# Patient Record
Sex: FEMALE | Race: WHITE | NOT HISPANIC OR LATINO | Employment: OTHER | URBAN - METROPOLITAN AREA
[De-identification: names, ages, dates, MRNs, and addresses within clinical notes are randomized per-mention and may not be internally consistent; named-entity substitution may affect disease eponyms.]

---

## 2017-01-14 ENCOUNTER — HOSPITAL ENCOUNTER (OUTPATIENT)
Dept: LAB | Facility: MEDICAL CENTER | Age: 62
End: 2017-01-14
Attending: FAMILY MEDICINE
Payer: COMMERCIAL

## 2017-01-14 LAB
25(OH)D3 SERPL-MCNC: 21 NG/ML (ref 30–100)
ALBUMIN SERPL BCP-MCNC: 4.6 G/DL (ref 3.2–4.9)
ALBUMIN/GLOB SERPL: 1.6 G/DL
ALP SERPL-CCNC: 109 U/L (ref 30–99)
ALT SERPL-CCNC: 15 U/L (ref 2–50)
ANION GAP SERPL CALC-SCNC: 8 MMOL/L (ref 0–11.9)
AST SERPL-CCNC: 16 U/L (ref 12–45)
BASOPHILS # BLD AUTO: 0.03 K/UL (ref 0–0.12)
BASOPHILS NFR BLD AUTO: 0.6 % (ref 0–1.8)
BILIRUB SERPL-MCNC: 0.5 MG/DL (ref 0.1–1.5)
BUN SERPL-MCNC: 13 MG/DL (ref 8–22)
CALCIUM SERPL-MCNC: 9.4 MG/DL (ref 8.5–10.5)
CHLORIDE SERPL-SCNC: 104 MMOL/L (ref 96–112)
CHOLEST SERPL-MCNC: 254 MG/DL (ref 100–199)
CO2 SERPL-SCNC: 26 MMOL/L (ref 20–33)
CREAT SERPL-MCNC: 0.65 MG/DL (ref 0.5–1.4)
EOSINOPHIL # BLD: 0.15 K/UL (ref 0–0.51)
EOSINOPHIL NFR BLD AUTO: 2.8 % (ref 0–6.9)
ERYTHROCYTE [DISTWIDTH] IN BLOOD BY AUTOMATED COUNT: 44.6 FL (ref 35.9–50)
EST. AVERAGE GLUCOSE BLD GHB EST-MCNC: 111 MG/DL
GLOBULIN SER CALC-MCNC: 2.8 G/DL (ref 1.9–3.5)
GLUCOSE SERPL-MCNC: 103 MG/DL (ref 65–99)
HBA1C MFR BLD: 5.5 % (ref 0–5.6)
HCT VFR BLD AUTO: 44.5 % (ref 37–47)
HDLC SERPL-MCNC: 75 MG/DL
HGB BLD-MCNC: 15.5 G/DL (ref 12–16)
IMM GRANULOCYTES # BLD AUTO: 0.01 K/UL (ref 0–0.11)
IMM GRANULOCYTES NFR BLD AUTO: 0.2 % (ref 0–0.9)
LDLC SERPL CALC-MCNC: 156 MG/DL
LYMPHOCYTES # BLD: 1.6 K/UL (ref 1–4.8)
LYMPHOCYTES NFR BLD AUTO: 29.7 % (ref 22–41)
MCH RBC QN AUTO: 32.6 PG (ref 27–33)
MCHC RBC AUTO-ENTMCNC: 34.8 G/DL (ref 33.6–35)
MCV RBC AUTO: 93.7 FL (ref 81.4–97.8)
MONOCYTES # BLD: 0.58 K/UL (ref 0–0.85)
MONOCYTES NFR BLD AUTO: 10.8 % (ref 0–13.4)
NEUTROPHILS # BLD: 3.02 K/UL (ref 2–7.15)
NEUTROPHILS NFR BLD AUTO: 55.9 % (ref 44–72)
NRBC # BLD AUTO: 0 K/UL
NRBC BLD-RTO: 0 /100 WBC
PLATELET # BLD AUTO: 194 K/UL (ref 164–446)
PMV BLD AUTO: 11.6 FL (ref 9–12.9)
POTASSIUM SERPL-SCNC: 3.7 MMOL/L (ref 3.6–5.5)
PROT SERPL-MCNC: 7.4 G/DL (ref 6–8.2)
RBC # BLD AUTO: 4.75 M/UL (ref 4.2–5.4)
SODIUM SERPL-SCNC: 138 MMOL/L (ref 135–145)
T3FREE SERPL-MCNC: 4.22 PG/ML (ref 2.4–4.2)
T4 FREE SERPL-MCNC: 0.84 NG/DL (ref 0.53–1.43)
THYROPEROXIDASE AB SERPL-ACNC: 144.7 IU/ML (ref 0–9)
TRIGL SERPL-MCNC: 116 MG/DL (ref 0–149)
TSH SERPL DL<=0.005 MIU/L-ACNC: 0.23 UIU/ML (ref 0.3–3.7)
WBC # BLD AUTO: 5.4 K/UL (ref 4.8–10.8)

## 2017-01-14 PROCEDURE — 84443 ASSAY THYROID STIM HORMONE: CPT

## 2017-01-14 PROCEDURE — 85025 COMPLETE CBC W/AUTO DIFF WBC: CPT

## 2017-01-14 PROCEDURE — 36415 COLL VENOUS BLD VENIPUNCTURE: CPT

## 2017-01-14 PROCEDURE — 80061 LIPID PANEL: CPT

## 2017-01-14 PROCEDURE — 86376 MICROSOMAL ANTIBODY EACH: CPT

## 2017-01-14 PROCEDURE — 83036 HEMOGLOBIN GLYCOSYLATED A1C: CPT

## 2017-01-14 PROCEDURE — 80053 COMPREHEN METABOLIC PANEL: CPT

## 2017-01-14 PROCEDURE — 82306 VITAMIN D 25 HYDROXY: CPT

## 2017-01-14 PROCEDURE — 84439 ASSAY OF FREE THYROXINE: CPT

## 2017-01-14 PROCEDURE — 84481 FREE ASSAY (FT-3): CPT

## 2017-01-14 PROCEDURE — 86800 THYROGLOBULIN ANTIBODY: CPT

## 2017-01-16 LAB — THYROGLOB AB SERPL-ACNC: 360.3 IU/ML (ref 0–4)

## 2017-02-15 ENCOUNTER — TELEPHONE (OUTPATIENT)
Dept: PULMONOLOGY | Facility: HOSPICE | Age: 62
End: 2017-02-15

## 2017-02-15 DIAGNOSIS — G47.33 OBSTRUCTIVE SLEEP APNEA: ICD-10-CM

## 2017-03-22 ENCOUNTER — OFFICE VISIT (OUTPATIENT)
Dept: URGENT CARE | Facility: CLINIC | Age: 62
End: 2017-03-22
Payer: COMMERCIAL

## 2017-03-22 VITALS
HEIGHT: 62 IN | TEMPERATURE: 98.7 F | WEIGHT: 180 LBS | RESPIRATION RATE: 14 BRPM | HEART RATE: 126 BPM | BODY MASS INDEX: 33.13 KG/M2 | DIASTOLIC BLOOD PRESSURE: 74 MMHG | OXYGEN SATURATION: 96 % | SYSTOLIC BLOOD PRESSURE: 138 MMHG

## 2017-03-22 DIAGNOSIS — B96.89 ACUTE BACTERIAL SINUSITIS: ICD-10-CM

## 2017-03-22 DIAGNOSIS — R05.9 COUGH: ICD-10-CM

## 2017-03-22 DIAGNOSIS — J01.90 ACUTE BACTERIAL SINUSITIS: ICD-10-CM

## 2017-03-22 PROCEDURE — 99214 OFFICE O/P EST MOD 30 MIN: CPT | Performed by: NURSE PRACTITIONER

## 2017-03-22 RX ORDER — DIPHENHYDRAMINE HCL 25 MG
25 TABLET ORAL EVERY 6 HOURS PRN
COMMUNITY
End: 2018-01-12

## 2017-03-22 RX ORDER — AMOXICILLIN AND CLAVULANATE POTASSIUM 875; 125 MG/1; MG/1
1 TABLET, FILM COATED ORAL 2 TIMES DAILY
Qty: 14 TAB | Refills: 0 | Status: SHIPPED | OUTPATIENT
Start: 2017-03-22 | End: 2017-10-01

## 2017-03-22 ASSESSMENT — ENCOUNTER SYMPTOMS
SORE THROAT: 1
COUGH: 1
SINUS PAIN: 1
HEADACHES: 1
VOMITING: 0
NAUSEA: 0
RHINORRHEA: 1

## 2017-03-22 ASSESSMENT — VISUAL ACUITY
OD_CC: 20/15
OS_CC: 20/25

## 2017-03-22 NOTE — MR AVS SNAPSHOT
"        Lashae Patelell   3/22/2017 10:30 AM   Office Visit   MRN: [id number]    Department:  [facility]   Dept Phone:  [phone]    Description:  Female : 1955   Provider:  LAWRENCE Aldana           Reason for Visit     URI uri symptoms x 2 weeks .bilateral eye irritation x 2 days .      Allergies as of 3/22/2017     No Known Allergies      You were diagnosed with     Acute bacterial sinusitis   [071611]       Cough   [786.2.ICD-9-CM]         Vital Signs     Blood Pressure Pulse Temperature Respirations Height Weight    138/74 mmHg 126 37.1 °C (98.7 °F) 14 1.575 m (5' 2\") 81.647 kg (180 lb)    Body Mass Index Oxygen Saturation Smoking Status             32.91 kg/m2 96% Former Smoker         Basic Information     Date Of Birth Sex Race Ethnicity Preferred Language    1955 Female White Non- English      Your appointments     2017  4:00 PM   Established Patient with Valery Ibarra M.D.   [address] [address] [phone]           You will be receiving a confirmation call a few days before your appointment from our automated call confirmation system.   Please bring to your appointment; a photo ID, copies of your insurance card, all medication bottles and any co-pay that you are responsible for.  Please allow 45-60 minutes to complete your scheduled appointment.            2017  2:40 PM   Follow UP with LAWRENCE Mcgill   [facility] (--)    [address] [place] [address] A  [place] [id number] [phone]              Problem List              ICD-10-CM Priority Class Noted - Resolved    Hashimoto's thyroiditis E06.3   7/15/2016 - Present    Dyslipidemia, goal LDL below 130 E78.5   Unknown - Present    GERD (gastroesophageal reflux disease) K21.9   Unknown - Present    Elevated fasting glucose R73.01   7/15/2016 - Present    ROMELIA (obstructive sleep apnea) G47.33   " 7/28/2016 - Present    Hypersomnolence G47.10   7/28/2016 - Present      Health Maintenance        Date Due Completion Dates    IMM DTaP/Tdap/Td Vaccine (1 - Tdap) 1/30/1975 ---    COLONOSCOPY 1/30/2006 ---    MAMMOGRAM 7/1/2015 7/1/2014 (Prv Comp)    Override on 7/1/2014: Previously completed    IMM ZOSTER VACCINE 1/30/2016 ---    IMM INFLUENZA (1) 9/1/2016 ---    PAP SMEAR 6/23/2019 6/23/2016 (Prv Comp)    Override on 6/23/2016: Previously completed (Dr. Lozano)            Current Immunizations     No immunizations on file.      Below and/or attached are the medications your provider expects you to take. Review all of your home medications and newly ordered medications with your provider and/or pharmacist. Follow medication instructions as directed by your provider and/or pharmacist. Please keep your medication list with you and share with your provider. Update the information when medications are discontinued, doses are changed, or new medications (including over-the-counter products) are added; and carry medication information at all times in the event of emergency situations     Allergies:  No Known Allergies          Medications  Valid as of: March 22, 2017 - 11:04 AM    Generic Name Brand Name Tablet Size Instructions for use    Amoxicillin-Pot Clavulanate (Tab) AUGMENTIN 875-125 MG Take 1 Tab by mouth 2 times a day.        Brompheniramine-Phenylephrine   Take  by mouth.        DiphenhydrAMINE HCl (Tab) BENADRYL 25 MG Take 25 mg by mouth every 6 hours as needed for Sleep.        Hydrocod Polst-Chlorphen Polst (Suspension Extended Release) TUSSIONEX 10-8 MG/5ML Take 5 mL by mouth every 12 hours.        Multiple Minerals-Vitamins   Take  by mouth.        Probiotic Product   Take  by mouth.        .                 Medicines prescribed today were sent to:     [id number] - [place], [place] - [address] [place] [address]    Phone: [phone] Fax: [phone]    Open 24 Hours?:  No      Medication refill instructions:       If your prescription bottle indicates you have medication refills left, it is not necessary to call your provider’s office. Please contact your pharmacy and they will refill your medication.    If your prescription bottle indicates you do not have any refills left, you may request refills at any time through one of the following ways: The online [employer] system (except Urgent Care), by calling your provider’s office, or by asking your pharmacy to contact your provider’s office with a refill request. Medication refills are processed only during regular business hours and may not be available until the next business day. Your provider may request additional information or to have a follow-up visit with you prior to refilling your medication.   *Please Note: Medication refills are assigned a new Rx number when refilled electronically. Your pharmacy may indicate that no refills were authorized even though a new prescription for the same medication is available at the pharmacy. Please request the medicine by name with the pharmacy before contacting your provider for a refill.           [employer] Access Code: Activation code not generated  Current [employer] Status: Active

## 2017-03-22 NOTE — PROGRESS NOTES
"Subjective:      Lashae Hoff is a 62 y.o. female who presents with URI            URI   This is a new problem. The current episode started 1 to 4 weeks ago. The problem has been gradually worsening. There has been no fever. Associated symptoms include congestion, coughing, headaches, rhinorrhea, sinus pain and a sore throat. Pertinent negatives include no joint pain, nausea or vomiting. She has tried antihistamine and decongestant for the symptoms. The treatment provided mild relief.   Allergies, medications and history reviewed by me today      Review of Systems   HENT: Positive for congestion, rhinorrhea and sore throat.    Respiratory: Positive for cough.    Gastrointestinal: Negative for nausea and vomiting.   Musculoskeletal: Negative for joint pain.   Neurological: Positive for headaches.          Objective:     /74 mmHg  Pulse 126  Temp(Src) 37.1 °C (98.7 °F)  Resp 14  Ht 1.575 m (5' 2\")  Wt 81.647 kg (180 lb)  BMI 32.91 kg/m2  SpO2 96%     Physical Exam   Constitutional: She is oriented to person, place, and time. She appears well-developed and well-nourished. No distress.   HENT:   Head: Normocephalic and atraumatic.   Right Ear: External ear and ear canal normal. Tympanic membrane is not injected and not perforated. No middle ear effusion.   Left Ear: External ear and ear canal normal. Tympanic membrane is not injected and not perforated.  No middle ear effusion.   Nose: Mucosal edema present. Left sinus exhibits maxillary sinus tenderness.   Mouth/Throat: Posterior oropharyngeal erythema present. No oropharyngeal exudate.   Eyes: Conjunctivae are normal. Right eye exhibits no discharge. Left eye exhibits no discharge.   Neck: Normal range of motion. Neck supple.   Cardiovascular: Normal rate, regular rhythm and normal heart sounds.    No murmur heard.  Pulmonary/Chest: Effort normal and breath sounds normal. No respiratory distress.   Musculoskeletal: Normal range of motion. "   Normal movement of all 4 extremities.   Lymphadenopathy:     She has no cervical adenopathy.        Right: No supraclavicular adenopathy present.        Left: No supraclavicular adenopathy present.   Neurological: She is alert and oriented to person, place, and time. Gait normal.   Skin: Skin is warm and dry.   Psychiatric: She has a normal mood and affect. Her behavior is normal. Thought content normal.   Nursing note and vitals reviewed.              Assessment/Plan:     1. Acute bacterial sinusitis  amoxicillin-clavulanate (AUGMENTIN) 875-125 MG Tab   2. Cough  Hydrocod Polst-CPM Polst ER (TUSSIONEX) 10-8 MG/5ML Suspension Extended Release     Patient is cautioned on sedation potential of narcotic medication; no drinking, driving or operating heavy machinery while on this medication.  Differential diagnosis, natural history, supportive care, and indications for immediate follow-up discussed at length.

## 2017-05-01 ENCOUNTER — SLEEP CENTER VISIT (OUTPATIENT)
Dept: SLEEP MEDICINE | Facility: MEDICAL CENTER | Age: 62
End: 2017-05-01
Payer: COMMERCIAL

## 2017-05-01 VITALS
RESPIRATION RATE: 16 BRPM | OXYGEN SATURATION: 92 % | TEMPERATURE: 97.9 F | SYSTOLIC BLOOD PRESSURE: 130 MMHG | HEART RATE: 98 BPM | HEIGHT: 62 IN | BODY MASS INDEX: 35.15 KG/M2 | DIASTOLIC BLOOD PRESSURE: 90 MMHG | WEIGHT: 191 LBS

## 2017-05-01 DIAGNOSIS — G47.33 OSA (OBSTRUCTIVE SLEEP APNEA): ICD-10-CM

## 2017-05-01 PROCEDURE — 99213 OFFICE O/P EST LOW 20 MIN: CPT | Performed by: NURSE PRACTITIONER

## 2017-05-01 NOTE — PROGRESS NOTES
"Chief Complaint   Patient presents with   • Apnea     6 Mth follow up         HPI:  This is a 62 y.o. female with a history of obstructive sleep apnea.  Polysomnogram indicates AHI 28.7 and minimum saturation 79%. The patient is compliant with CPAP 9 cm. Compliance dated 4/1-30/17 indicates 100% compliance for 7 hours 43 minutes. AHI is then reduced to 0.3. The patient has no problems tolerating the mask or pressure. She reports waking up tired but feels she is sleeping better. She continues to benefit from therapy.    Past Medical History   Diagnosis Date   • History of asthma    • Arthritis of knee, right      ?   • Bruxism    • GERD (gastroesophageal reflux disease)    • History of hyperthyroidism      hachimoto's thyroiditis   • Chickenpox    • Dyslipidemia, goal LDL below 130    • Snoring    • Elevated fasting glucose        Past Surgical History   Procedure Laterality Date   • Tonsillectomy     • [place] by laparoscopy  2013       Social History   Substance Use Topics   • Smoking status: Former Smoker -- 1.00 packs/day for 2 years     Types: Cigarettes     Quit date: 07/06/1986   • Smokeless tobacco: Never Used   • Alcohol Use: 3.0 oz/week     5 Glasses of wine per week       ROS:   Constitutional: Denies fevers, chills, sweats, fatigue, and weight loss.  Eyes: Denies glasses.  Ears/nose/mouth/throat: Denies injury.  Cardiovascular: Denies chest pain, tightness.  Respiratory: Denies shortness of breath, cough, sputum, wheezing, hemoptysis.  GI: Denies heartburn, difficulty swallowing, nausea, and vomiting.  Neurological: Denies frequent headaches, dizziness, weakness.  Sleep: See history of present illness.    Vitals:  Filed Vitals:    05/01/17 0805   Height: 1.575 m (5' 2.01\")   Weight: 86.637 kg (191 lb)   Weight % change since last entry.: 0 %   BP: 130/90   Pulse: 98   BMI (Calculated): 34.93   Resp: 16   Temp: 36.6 °C (97.9 °F)   O2 sat % room air: 92 %     Allergies:  Review of patient's allergies " indicates no known allergies.    Medications.  Current Outpatient Prescriptions   Medication Sig Dispense Refill   • diphenhydrAMINE (BENADRYL) 25 MG Tab Take 25 mg by mouth every 6 hours as needed for Sleep.     • Brompheniramine-Phenylephrine (DIMETAPP COLD/ALLERGY PO) Take  by mouth.     • Hydrocod Polst-CPM Polst ER (TUSSIONEX) 10-8 MG/5ML Suspension Extended Release Take 5 mL by mouth every 12 hours. 140 mL 0   • amoxicillin-clavulanate (AUGMENTIN) 875-125 MG Tab Take 1 Tab by mouth 2 times a day. (Patient not taking: Reported on 5/1/2017) 14 Tab 0   • Multiple Minerals-Vitamins (CALCIUM & VIT D3 BONE HEALTH PO) Take  by mouth.     • Probiotic Product (PROBIOTIC DAILY PO) Take  by mouth.       No current facility-administered medications for this visit.       PHYSICAL EXAM:  Appearance: Well-developed, well-nourished, no acute distress.  Eyes. PERRL.  Hearing: Grossly intact.  Oropharynx: Tongue normal, posterior pharynx without erythema or exudate.  Respiratory effort: No intercostal retractions or use of accessory muscles.  Lung auscultation: No crackles, wheezing.  Heart auscultation: No murmur, gallop, or rub. Regular rate and rhythm.  Extremities: No cyanosis or edema.  Gait and Station: Normal  Orientation: Oriented to time, place, and person.    Assessment:  1. ROMELIA (obstructive sleep apnea)           Plan:  1. Continue CPAP 9 cm.  2. Clean equipment weekly and replace supplies as allowed by insurance.  3. Sleep hygiene discussed along with encouraged weight loss.     Return in about 1 year (around 5/1/2018) for With ALICE Miguel.

## 2017-05-01 NOTE — PATIENT INSTRUCTIONS
1. Continue CPAP 9 cm.  2. Clean equipment weekly and replace supplies as allowed by insurance.  3. Sleep hygiene discussed along with encouraged weight loss.

## 2017-05-01 NOTE — MR AVS SNAPSHOT
"        Lashae Hoff   2017 8:00 AM   Sleep Center Visit   MRN: [id number]    Department:  [facility]   Dept Phone:  [phone]    Description:  Female : 1955   Provider:  LAWRENCE Mario           Reason for Visit     Apnea 6 Mth follow up      Allergies as of 2017     No Known Allergies      You were diagnosed with     ROMELIA (obstructive sleep apnea)   [147866]         Vital Signs     Blood Pressure Pulse Temperature Respirations Height Weight    130/90 mmHg 98 36.6 °C (97.9 °F) 16 1.575 m (5' 2.01\") 86.637 kg (191 lb)    Body Mass Index Oxygen Saturation Smoking Status             34.93 kg/m2 92% Former Smoker         Basic Information     Date Of Birth Sex Race Ethnicity Preferred Language    1955 Female White Non- English      Your appointments     2017  3:20 PM   Annual/Preventative with Valery Ibarra M.D.   [address] [id number] [phone]           You will be receiving a confirmation call a few days before your appointment from our automated call confirmation system.   Please bring to your appointment; a photo ID, copies of your insurance card, all medication bottles and any co-pay that you are responsible for.  Please allow 45-60 minutes to complete your scheduled appointment.              Problem List              ICD-10-CM Priority Class Noted - Resolved    Hashimoto's thyroiditis E06.3   7/15/2016 - Present    Dyslipidemia, goal LDL below 130 E78.5   Unknown - Present    GERD (gastroesophageal reflux disease) K21.9   Unknown - Present    Elevated fasting glucose R73.01   7/15/2016 - Present    ROMELIA (obstructive sleep apnea) G47.33   2016 - Present    Hypersomnolence G47.10   2016 - Present      Health Maintenance        Date Due Completion Dates    IMM DTaP/Tdap/Td Vaccine (1 - Tdap) 1975 ---    COLONOSCOPY 2006 ---    MAMMOGRAM 2015 (Prv Comp)   " Override on 7/1/2014: Previously completed    IMM ZOSTER VACCINE 1/30/2016 ---    PAP SMEAR 6/23/2019 6/23/2016 (Prv Comp)    Override on 6/23/2016: Previously completed (Dr. Lozano)            Current Immunizations     No immunizations on file.      Below and/or attached are the medications your provider expects you to take. Review all of your home medications and newly ordered medications with your provider and/or pharmacist. Follow medication instructions as directed by your provider and/or pharmacist. Please keep your medication list with you and share with your provider. Update the information when medications are discontinued, doses are changed, or new medications (including over-the-counter products) are added; and carry medication information at all times in the event of emergency situations     Allergies:  No Known Allergies          Medications  Valid as of: May 01, 2017 -  8:23 AM    Generic Name Brand Name Tablet Size Instructions for use    Amoxicillin-Pot Clavulanate (Tab) AUGMENTIN 875-125 MG Take 1 Tab by mouth 2 times a day.        Brompheniramine-Phenylephrine   Take  by mouth.        DiphenhydrAMINE HCl (Tab) BENADRYL 25 MG Take 25 mg by mouth every 6 hours as needed for Sleep.        Hydrocod Polst-Chlorphen Polst (Suspension Extended Release) TUSSIONEX 10-8 MG/5ML Take 5 mL by mouth every 12 hours.        Multiple Minerals-Vitamins   Take  by mouth.        Probiotic Product   Take  by mouth.        .                 Medicines prescribed today were sent to:     [id number] - [place], [place] - [address] [place] [id number]    Phone: [phone] Fax: [phone]    Open 24 Hours?: No      Medication refill instructions:       If your prescription bottle indicates you have medication refills left, it is not necessary to call your provider’s office. Please contact your pharmacy and they will refill your medication.    If your prescription bottle indicates you do not  have any refills left, you may request refills at any time through one of the following ways: The online [employer] system (except Urgent Care), by calling your provider’s office, or by asking your pharmacy to contact your provider’s office with a refill request. Medication refills are processed only during regular business hours and may not be available until the next business day. Your provider may request additional information or to have a follow-up visit with you prior to refilling your medication.   *Please Note: Medication refills are assigned a new Rx number when refilled electronically. Your pharmacy may indicate that no refills were authorized even though a new prescription for the same medication is available at the pharmacy. Please request the medicine by name with the pharmacy before contacting your provider for a refill.        Instructions    1. Continue CPAP 9 cm.  2. Clean equipment weekly and replace supplies as allowed by insurance.  3. Sleep hygiene discussed along with encouraged weight loss.               [employer] Access Code: Activation code not generated  Current [employer] Status: Active

## 2017-05-31 ENCOUNTER — HOSPITAL ENCOUNTER (OUTPATIENT)
Facility: MEDICAL CENTER | Age: 62
End: 2017-05-31
Attending: OBSTETRICS & GYNECOLOGY
Payer: COMMERCIAL

## 2017-05-31 PROCEDURE — 88305 TISSUE EXAM BY PATHOLOGIST: CPT

## 2017-07-28 ENCOUNTER — HOSPITAL ENCOUNTER (OUTPATIENT)
Dept: LAB | Facility: MEDICAL CENTER | Age: 62
End: 2017-07-28
Attending: NURSE PRACTITIONER
Payer: COMMERCIAL

## 2017-07-28 LAB
25(OH)D3 SERPL-MCNC: 21 NG/ML (ref 30–100)
ALBUMIN SERPL BCP-MCNC: 4.6 G/DL (ref 3.2–4.9)
ALBUMIN/GLOB SERPL: 1.6 G/DL
ALP SERPL-CCNC: 126 U/L (ref 30–99)
ALT SERPL-CCNC: 13 U/L (ref 2–50)
ANION GAP SERPL CALC-SCNC: 10 MMOL/L (ref 0–11.9)
APPEARANCE UR: CLEAR
AST SERPL-CCNC: 14 U/L (ref 12–45)
BACTERIA #/AREA URNS HPF: NEGATIVE /HPF
BASOPHILS # BLD AUTO: 0.7 % (ref 0–1.8)
BASOPHILS # BLD: 0.05 K/UL (ref 0–0.12)
BILIRUB SERPL-MCNC: 0.5 MG/DL (ref 0.1–1.5)
BILIRUB UR QL STRIP.AUTO: NEGATIVE
BUN SERPL-MCNC: 13 MG/DL (ref 8–22)
CALCIUM SERPL-MCNC: 9.5 MG/DL (ref 8.5–10.5)
CHLORIDE SERPL-SCNC: 104 MMOL/L (ref 96–112)
CHOLEST SERPL-MCNC: 243 MG/DL (ref 100–199)
CO2 SERPL-SCNC: 24 MMOL/L (ref 20–33)
COLOR UR: YELLOW
CREAT SERPL-MCNC: 0.66 MG/DL (ref 0.5–1.4)
CULTURE IF INDICATED INDCX: YES UA CULTURE
EOSINOPHIL # BLD AUTO: 0.21 K/UL (ref 0–0.51)
EOSINOPHIL NFR BLD: 3.1 % (ref 0–6.9)
EPI CELLS #/AREA URNS HPF: NEGATIVE /HPF
ERYTHROCYTE [DISTWIDTH] IN BLOOD BY AUTOMATED COUNT: 47.7 FL (ref 35.9–50)
GFR SERPL CREATININE-BSD FRML MDRD: >60 ML/MIN/1.73 M 2
GLOBULIN SER CALC-MCNC: 2.9 G/DL (ref 1.9–3.5)
GLUCOSE SERPL-MCNC: 94 MG/DL (ref 65–99)
GLUCOSE UR STRIP.AUTO-MCNC: NEGATIVE MG/DL
HCT VFR BLD AUTO: 46.4 % (ref 37–47)
HDLC SERPL-MCNC: 70 MG/DL
HGB BLD-MCNC: 15.6 G/DL (ref 12–16)
HYALINE CASTS #/AREA URNS LPF: NORMAL /LPF
IMM GRANULOCYTES # BLD AUTO: 0.02 K/UL (ref 0–0.11)
IMM GRANULOCYTES NFR BLD AUTO: 0.3 % (ref 0–0.9)
KETONES UR STRIP.AUTO-MCNC: NEGATIVE MG/DL
LDLC SERPL CALC-MCNC: 135 MG/DL
LEUKOCYTE ESTERASE UR QL STRIP.AUTO: ABNORMAL
LYMPHOCYTES # BLD AUTO: 1.92 K/UL (ref 1–4.8)
LYMPHOCYTES NFR BLD: 28.4 % (ref 22–41)
MCH RBC QN AUTO: 31.7 PG (ref 27–33)
MCHC RBC AUTO-ENTMCNC: 33.6 G/DL (ref 33.6–35)
MCV RBC AUTO: 94.3 FL (ref 81.4–97.8)
MICRO URNS: ABNORMAL
MONOCYTES # BLD AUTO: 0.58 K/UL (ref 0–0.85)
MONOCYTES NFR BLD AUTO: 8.6 % (ref 0–13.4)
NEUTROPHILS # BLD AUTO: 3.98 K/UL (ref 2–7.15)
NEUTROPHILS NFR BLD: 58.9 % (ref 44–72)
NITRITE UR QL STRIP.AUTO: NEGATIVE
NRBC # BLD AUTO: 0 K/UL
NRBC BLD AUTO-RTO: 0 /100 WBC
PH UR STRIP.AUTO: 6.5 [PH]
PLATELET # BLD AUTO: 225 K/UL (ref 164–446)
PMV BLD AUTO: 12.7 FL (ref 9–12.9)
POTASSIUM SERPL-SCNC: 3.9 MMOL/L (ref 3.6–5.5)
PROT SERPL-MCNC: 7.5 G/DL (ref 6–8.2)
PROT UR QL STRIP: NEGATIVE MG/DL
RBC # BLD AUTO: 4.92 M/UL (ref 4.2–5.4)
RBC # URNS HPF: NORMAL /HPF
RBC UR QL AUTO: NEGATIVE
RHEUMATOID FACT SER IA-ACNC: <10 IU/ML (ref 0–14)
SODIUM SERPL-SCNC: 138 MMOL/L (ref 135–145)
SP GR UR STRIP.AUTO: 1.01
T3FREE SERPL-MCNC: 3.33 PG/ML (ref 2.4–4.2)
T4 FREE SERPL-MCNC: 0.84 NG/DL (ref 0.53–1.43)
TRIGL SERPL-MCNC: 189 MG/DL (ref 0–149)
TSH SERPL DL<=0.005 MIU/L-ACNC: 0.38 UIU/ML (ref 0.3–3.7)
UROBILINOGEN UR STRIP.AUTO-MCNC: 0.2 MG/DL
WBC # BLD AUTO: 6.8 K/UL (ref 4.8–10.8)
WBC #/AREA URNS HPF: NORMAL /HPF

## 2017-07-28 PROCEDURE — 84439 ASSAY OF FREE THYROXINE: CPT

## 2017-07-28 PROCEDURE — 80053 COMPREHEN METABOLIC PANEL: CPT

## 2017-07-28 PROCEDURE — 86800 THYROGLOBULIN ANTIBODY: CPT

## 2017-07-28 PROCEDURE — 84443 ASSAY THYROID STIM HORMONE: CPT

## 2017-07-28 PROCEDURE — 87086 URINE CULTURE/COLONY COUNT: CPT

## 2017-07-28 PROCEDURE — 80061 LIPID PANEL: CPT

## 2017-07-28 PROCEDURE — 82784 ASSAY IGA/IGD/IGG/IGM EACH: CPT

## 2017-07-28 PROCEDURE — 81001 URINALYSIS AUTO W/SCOPE: CPT

## 2017-07-28 PROCEDURE — 86431 RHEUMATOID FACTOR QUANT: CPT

## 2017-07-28 PROCEDURE — 36415 COLL VENOUS BLD VENIPUNCTURE: CPT

## 2017-07-28 PROCEDURE — 83516 IMMUNOASSAY NONANTIBODY: CPT

## 2017-07-28 PROCEDURE — 83013 H PYLORI (C-13) BREATH: CPT

## 2017-07-28 PROCEDURE — 84481 FREE ASSAY (FT-3): CPT

## 2017-07-28 PROCEDURE — 86038 ANTINUCLEAR ANTIBODIES: CPT

## 2017-07-28 PROCEDURE — 82306 VITAMIN D 25 HYDROXY: CPT

## 2017-07-28 PROCEDURE — 85025 COMPLETE CBC W/AUTO DIFF WBC: CPT

## 2017-07-30 LAB
BACTERIA UR CULT: NORMAL
IGA SERPL-MCNC: 134 MG/DL (ref 68–408)
SIGNIFICANT IND 70042: NORMAL
SOURCE SOURCE: NORMAL
TTG IGA SER IA-ACNC: 1 U/ML (ref 0–3)
UREA BREATH TEST QL: NEGATIVE

## 2017-08-01 LAB
NUCLEAR IGG SER QL IA: DETECTED
NUCLEAR IGG TITR SER IF: ABNORMAL {TITER}

## 2017-08-02 LAB
THYROGLOB AB SERPL-ACNC: 302.1 IU/ML (ref 0–4)
THYROGLOB SERPL-MCNC: 2.6 NG/ML (ref 1.3–31.8)
THYROGLOB SERPL-MCNC: ABNORMAL NG/ML (ref 1.3–31.8)

## 2017-10-01 ENCOUNTER — OFFICE VISIT (OUTPATIENT)
Dept: URGENT CARE | Facility: CLINIC | Age: 62
End: 2017-10-01
Payer: COMMERCIAL

## 2017-10-01 VITALS
OXYGEN SATURATION: 96 % | WEIGHT: 190 LBS | HEIGHT: 62 IN | SYSTOLIC BLOOD PRESSURE: 118 MMHG | TEMPERATURE: 98.3 F | DIASTOLIC BLOOD PRESSURE: 76 MMHG | BODY MASS INDEX: 34.96 KG/M2 | RESPIRATION RATE: 16 BRPM | HEART RATE: 102 BPM

## 2017-10-01 DIAGNOSIS — R05.8 PRODUCTIVE COUGH: ICD-10-CM

## 2017-10-01 DIAGNOSIS — J22 ACUTE LOWER RESPIRATORY TRACT INFECTION: ICD-10-CM

## 2017-10-01 PROCEDURE — 99214 OFFICE O/P EST MOD 30 MIN: CPT | Performed by: NURSE PRACTITIONER

## 2017-10-01 RX ORDER — AZITHROMYCIN 250 MG/1
TABLET, FILM COATED ORAL
Qty: 6 TAB | Refills: 0 | Status: SHIPPED | OUTPATIENT
Start: 2017-10-01 | End: 2018-01-12

## 2017-10-01 RX ORDER — CODEINE PHOSPHATE AND GUAIFENESIN 10; 100 MG/5ML; MG/5ML
5 SOLUTION ORAL
Qty: 120 ML | Refills: 0 | Status: SHIPPED | OUTPATIENT
Start: 2017-10-01 | End: 2018-01-12

## 2017-10-01 RX ORDER — BENZONATATE 100 MG/1
100 CAPSULE ORAL 3 TIMES DAILY PRN
Qty: 30 CAP | Refills: 0 | Status: SHIPPED | OUTPATIENT
Start: 2017-10-01 | End: 2018-01-12

## 2017-10-01 NOTE — PROGRESS NOTES
Chief Complaint   Patient presents with   • URI     x 5 days with fever, cough, lung pain and back pain and guetting worse. pt preffers paper Rx       HISTORY OF PRESENT ILLNESS: Patient is a 62 y.o. female who presents today due to symptoms that started six days ago, gradually worsening by the day. Pt reports a productive cough without blood in sputum. Reports associated mild sore throat, nasal congestion, sinus pressure, fever, and body aches. Denies chest pain, shortness of breath, or wheezing. Denies h/o asthma/copd/CAP. No immunocompromise. Has tried OTC cold medications without significant relief of symptoms. No recent ABX use. No other aggravating or alleviating factors.     Patient Active Problem List    Diagnosis Date Noted   • ROMELIA (obstructive sleep apnea) 07/28/2016   • Hypersomnolence 07/28/2016   • Hashimoto's thyroiditis 07/15/2016   • Elevated fasting glucose 07/15/2016   • Dyslipidemia, goal LDL below 130    • GERD (gastroesophageal reflux disease)        Allergies:Review of patient's allergies indicates no known allergies.    Current Outpatient Prescriptions Ordered in [facility]   Medication Sig Dispense Refill   • azithromycin (ZITHROMAX) 250 MG Tab Take two tabs on day one followed by one tab on days 2-5. 6 Tab 0   • guaifenesin-codeine (ROBITUSSIN AC) Solution oral solution Take 5 mL by mouth at bedtime as needed. 120 mL 0   • benzonatate (TESSALON) 100 MG Cap Take 1 Cap by mouth 3 times a day as needed. 30 Cap 0   • diphenhydrAMINE (BENADRYL) 25 MG Tab Take 25 mg by mouth every 6 hours as needed for Sleep.     • Brompheniramine-Phenylephrine (DIMETAPP COLD/ALLERGY PO) Take  by mouth.     • Multiple Minerals-Vitamins (CALCIUM & VIT D3 BONE HEALTH PO) Take  by mouth.     • Probiotic Product (PROBIOTIC DAILY PO) Take  by mouth.       No current Epic-ordered facility-administered medications on file.        Past Medical History:   Diagnosis Date   • Arthritis of knee, right     ?   • Bruxism    •  "Chickenpox    • Dyslipidemia, goal LDL below 130    • Elevated fasting glucose    • GERD (gastroesophageal reflux disease)    • History of asthma    • History of hyperthyroidism     hachimoto's thyroiditis   • Snoring        Social History   Substance Use Topics   • Smoking status: Former Smoker     Packs/day: 1.00     Years: 2.00     Types: Cigarettes     Quit date: 1986   • Smokeless tobacco: Never Used   • Alcohol use 3.0 oz/week     5 Glasses of wine per week       Family Status   Relation Status   • Mother Alive   • Brother Alive   • Father    • Brother Alive   • Son Alive   • Son Alive   • Daughter Alive     Family History   Problem Relation Age of Onset   • Cancer Father      throat   • Hypertension Mother    • Arthritis Father      gout   • Diabetes Mother      typr II   • Hyperlipidemia Mother    • Diabetes Brother        ROS:  Review of Systems   Constitutional: Positive for subjective fever, chills, fatigue. Negative for weight loss and malaise.  HENT: Positive for congestion and sore throat. Negative for ear pain, nosebleeds, and neck pain.    Eyes: Negative for vision changes.   Cardiovascular: Negative for chest pain, palpitations, orthopnea and leg swelling.   Respiratory: Positive for cough and sputum production. Negative for shortness of breath and wheezing.   Gastrointestinal: Negative for abdominal pain, nausea, vomiting or diarrhea.   Skin: Negative for rash, diaphoresis.     Exam:  Blood pressure 118/76, pulse (!) 102, temperature 36.8 °C (98.3 °F), resp. rate 16, height 1.575 m (5' 2\"), weight 86.2 kg (190 lb), SpO2 96 %.  General: well-nourished, well-developed female in NAD  Head: normocephalic, atraumatic  Eyes: PERRLA, EOM within normal limits, no conjunctival injection, no scleral icterus, visual fields and acuity grossly intact.  Ears: normal shape and symmetry, no tenderness, no discharge. External canals are without any significant edema or erythema. Tympanic membranes are " without any inflammation, no effusion. Gross auditory acuity is intact.  Nose: symmetrical without tenderness, mild discharge, erythema present bilateral nares.  Mouth/Throat: reasonable hygiene, no exudates or tonsillar enlargement. Erythema present.   Neck: no masses, range of motion within normal limits, no tracheal deviation.  Lymph: mild cervical adenopathy. No supraclavicular adenopathy.   Neuro: alert and oriented. Cranial nerves 1-12 grossly intact.   Cardiovascular: tachycardic rate and regular rhythm without murmurs, rubs, or gallops. No edema.   Pulmonary: no distress. Chest is symmetrical with respiration, no wheezes, crackles. Scattered rhonchi RLL.   Musculoskeletal: appropriate muscle tone, gait is stable.  Skin: warm, dry, intact, no clubbing, no cyanosis.   Psych: appropriate mood, affect, judgement.         Assessment/Plan:  1. Acute lower respiratory tract infection  azithromycin (ZITHROMAX) 250 MG Tab   2. Productive cough  azithromycin (ZITHROMAX) 250 MG Tab    guaifenesin-codeine (ROBITUSSIN AC) Solution oral solution    benzonatate (TESSALON) 100 MG Cap           Discussed that I felt her symptoms were most likely viral in nature. Contingent antibiotic prescription given to patient to fill upon meeting criteria of guidelines discussed. I have offered patient chest x-ray today, declining. Tessalon and Robitussin AC for cough, sedative effects of medication discussed with patient. Rest, increase fluids, hand and respiratory hygiene. May take OTC medications as directed for symptom relief.   Supportive care, differential diagnoses, and indications for immediate follow-up discussed with patient.   Pathogenesis of diagnosis discussed including typical length and natural progression.  Instructed to return to clinic or nearest emergency department for any change in condition, further concerns, or worsening of symptoms.  Patient states understanding of the plan of care and discharge  instructions.  Instructed to make an appointment with their primary care provider in the next 3-7 days if not significantly improving and for further care.         Please note that this dictation was created using voice recognition software. I have made every reasonable attempt to correct obvious errors, but I expect that there are errors of grammar and possibly content that I did not discover before finalizing the note.      BRYON Bahena.

## 2017-11-02 ENCOUNTER — HOSPITAL ENCOUNTER (OUTPATIENT)
Facility: MEDICAL CENTER | Age: 62
End: 2017-11-02
Payer: COMMERCIAL

## 2017-11-02 LAB
ALBUMIN SERPL BCP-MCNC: 4.5 G/DL (ref 3.2–4.9)
ALBUMIN/GLOB SERPL: 1.6 G/DL
ALP SERPL-CCNC: 117 U/L (ref 30–99)
ALT SERPL-CCNC: 13 U/L (ref 2–50)
ANION GAP SERPL CALC-SCNC: 9 MMOL/L (ref 0–11.9)
AST SERPL-CCNC: 10 U/L (ref 12–45)
BDY FAT % MEASURED: 44.6 %
BILIRUB SERPL-MCNC: 0.4 MG/DL (ref 0.1–1.5)
BP DIAS: 80 MMHG
BP SYS: 130 MMHG
BUN SERPL-MCNC: 15 MG/DL (ref 8–22)
CALCIUM SERPL-MCNC: 9.6 MG/DL (ref 8.5–10.5)
CHLORIDE SERPL-SCNC: 103 MMOL/L (ref 96–112)
CHOLEST SERPL-MCNC: 257 MG/DL (ref 100–199)
CO2 SERPL-SCNC: 24 MMOL/L (ref 20–33)
CREAT SERPL-MCNC: 0.53 MG/DL (ref 0.5–1.4)
DIABETES HTDIA: NO
EVENT NAME HTEVT: NORMAL
GFR SERPL CREATININE-BSD FRML MDRD: >60 ML/MIN/1.73 M 2
GLOBULIN SER CALC-MCNC: 2.8 G/DL (ref 1.9–3.5)
GLUCOSE SERPL-MCNC: 101 MG/DL (ref 65–99)
HDLC SERPL-MCNC: 67 MG/DL
HYPERTENSION HTHYP: NO
LDLC SERPL CALC-MCNC: 160 MG/DL
POTASSIUM SERPL-SCNC: 3.9 MMOL/L (ref 3.6–5.5)
PROT SERPL-MCNC: 7.3 G/DL (ref 6–8.2)
SCREENING LOC CITY HTCIT: NORMAL
SCREENING LOC STATE HTSTA: NORMAL
SCREENING LOCATION HTLOC: NORMAL
SODIUM SERPL-SCNC: 136 MMOL/L (ref 135–145)
SUBSCRIBER ID HTSID: NORMAL
TRIGL SERPL-MCNC: 148 MG/DL (ref 0–149)

## 2018-01-12 ENCOUNTER — OFFICE VISIT (OUTPATIENT)
Dept: MEDICAL GROUP | Facility: PHYSICIAN GROUP | Age: 63
End: 2018-01-12
Payer: COMMERCIAL

## 2018-01-12 VITALS
WEIGHT: 199 LBS | RESPIRATION RATE: 14 BRPM | HEIGHT: 62 IN | TEMPERATURE: 97.6 F | BODY MASS INDEX: 36.62 KG/M2 | HEART RATE: 86 BPM | OXYGEN SATURATION: 95 % | SYSTOLIC BLOOD PRESSURE: 116 MMHG | DIASTOLIC BLOOD PRESSURE: 74 MMHG

## 2018-01-12 DIAGNOSIS — E04.9 ENLARGED THYROID GLAND: ICD-10-CM

## 2018-01-12 DIAGNOSIS — M19.90 ARTHRITIS: ICD-10-CM

## 2018-01-12 DIAGNOSIS — R74.8 ALKALINE PHOSPHATASE ELEVATION: ICD-10-CM

## 2018-01-12 DIAGNOSIS — R73.01 ELEVATED FASTING GLUCOSE: ICD-10-CM

## 2018-01-12 DIAGNOSIS — E66.9 OBESITY (BMI 35.0-39.9 WITHOUT COMORBIDITY): ICD-10-CM

## 2018-01-12 DIAGNOSIS — E06.3 HASHIMOTO'S THYROIDITIS: ICD-10-CM

## 2018-01-12 DIAGNOSIS — G47.33 OSA (OBSTRUCTIVE SLEEP APNEA): ICD-10-CM

## 2018-01-12 DIAGNOSIS — I45.10 RBBB: ICD-10-CM

## 2018-01-12 DIAGNOSIS — E78.5 DYSLIPIDEMIA, GOAL LDL BELOW 130: ICD-10-CM

## 2018-01-12 PROCEDURE — 99204 OFFICE O/P NEW MOD 45 MIN: CPT | Performed by: INTERNAL MEDICINE

## 2018-01-12 ASSESSMENT — PATIENT HEALTH QUESTIONNAIRE - PHQ9: CLINICAL INTERPRETATION OF PHQ2 SCORE: 0

## 2018-01-12 NOTE — ASSESSMENT & PLAN NOTE
She has anti tpo positive. TSH normal in the past, she has gained 17 lbs in one year and a half. She tells me that she does not eat a lot. She tries to walk. She is bloated most of the time. She has brain foge a lot. She denies constipation. She is trying herbal

## 2018-01-12 NOTE — ASSESSMENT & PLAN NOTE
Alk phos slight elevated. She follows with GI, she is schedule for colonoscopy but she canceled because she was sick. She denies abdominal pain, she has sensation that something is lifting up into her lungs. He denies melanotic stools or hematochezia. She denies unintentional weight loss

## 2018-01-12 NOTE — ASSESSMENT & PLAN NOTE
She has swelling of the left 2nd PIP. Sometimes swelling. She has family positive for gout. She denies any other joint attack . She has no tophi. Slight swelling of her joint. She reports being stiff in the morning, RF negative in the past , all other rheumatological markers have been negative

## 2018-01-13 NOTE — PROGRESS NOTES
New Patient to Establish    Reason to establish: Lab work, arthritis, thyroid condition, right bundle-branch branch block    Lashae Hoff is a 62 y.o. female here today for evaluation and management of:    Hashimoto's thyroiditis  She has anti tpo positive. TSH normal in the past, she has gained 17 lbs in one year and a half. She tells me that she does not eat a lot. She tries to walk. She is bloated most of the time. She has brain foge a lot. She denies constipation. She is trying herbal     Enlarged thyroid gland  Her thyroid is enlarged on examination     Elevated fasting glucose  Continue to monitor     Dyslipidemia, goal LDL below 130  . Repeat lab work calculate ascvd score at next apt     Arthritis  She has swelling of the left 2nd PIP. Sometimes swelling. She has family positive for gout. She denies any other joint attack . She has no tophi. Slight swelling of her joint. She reports being stiff in the morning, RF negative in the past , all other rheumatological markers have been negative     Alkaline phosphatase elevation  Alk phos slight elevated. She follows with GI, she is schedule for colonoscopy but she canceled because she was sick. She denies abdominal pain, she has sensation that something is lifting up into her lungs. He denies melanotic stools or hematochezia. She denies unintentional weight loss    RBBB  One time she had incidental findings of RBBB. She was told so. It has happened that anther time she had numbness on the left arm. She was seen at ER. She had stroke work up but everything was negative. She denies chest pain     ROMELIA (obstructive sleep apnea)  She is being diagnosed recently with associated due to overweight. She is on CPAP machine. She reports compliance with that. She follows with [facility]    Obesity (BMI 35.0-39.9 without comorbidity) ([facility])  She contributes her wieght due to thyroid condition. See above      Past Medical History:   Diagnosis Date   • Arthritis of knee,  "right     ?   • Bruxism    • Chickenpox    • Dyslipidemia, goal LDL below 130    • Elevated fasting glucose    • GERD (gastroesophageal reflux disease)    • History of asthma    • History of hyperthyroidism     hachimoto's thyroiditis   • Snoring        Current Outpatient Prescriptions   Medication Sig Dispense Refill   • Probiotic Product (PROBIOTIC DAILY PO) Take  by mouth.       No current facility-administered medications for this visit.        Allergies as of 01/12/2018   • (No Known Allergies)       Social History     Social History   • Marital status:      Spouse name: N/A   • Number of children: N/A   • Years of education: N/A     Occupational History   • Not on file.     Social History Main Topics   • Smoking status: Former Smoker     Packs/day: 1.00     Years: 2.00     Types: Cigarettes     Quit date: 7/6/1986   • Smokeless tobacco: Never Used   • Alcohol use 3.0 oz/week     5 Glasses of wine per week   • Drug use: No   • Sexual activity: Not Currently     Other Topics Concern   • Not on file     Social History Narrative   • No narrative on file       Family History   Problem Relation Age of Onset   • Hypertension Mother    • Diabetes Mother      typr II   • Hyperlipidemia Mother    • Cancer Father      throat   • Arthritis Father      gout   • Diabetes Brother        Past Surgical History:   Procedure Laterality Date   • BRAD BY LAPAROSCOPY  2013   • TONSILLECTOMY         ROS: All systems reviewed are negative except for HPI    /74   Pulse 86   Temp 36.4 °C (97.6 °F)   Resp 14   Ht 1.575 m (5' 2\")   Wt 90.3 kg (199 lb)   SpO2 95%   Breastfeeding? No   BMI 36.40 kg/m²     Physical Exam  General:  Alert and oriented, No apparent distress.  Eyes: Pupils equal and reactive. No scleral icterus. EOMI  Throat: Clear no erythema or exudates noted. Oral mucosa moist, oral dental intact  Neck: Supple. No cervical or supraclavicular lymphadenopathy noted. Thyroid  enlarged.  Lungs: normal " effort,  Clear to auscultation  Cardiovascular: Regular rate and rhythm. No murmurs, rubs or gallops, pulses intact   Abdomen:  Soft, +BS, no tenderness. No rebound or guarding noted. No hepato or splenomegaly   Extremities: No clubbing, cyanosis, edema.  Neuro: cranial nerves intact, sensation intact   Muscle skeletal: No back tenderness, moves all extremities freely  Skin: Clear. No rash or suspicious skin lesions noted.       Assessment and Plan    1. Hashimoto's thyroiditis  There are two conditions to consider treatment of Hashimoto's thyroiditis: lipid panel which is elevated and her weight. Pt seems symptomatic. I will order more specific test. I will rule out Graves disease. TSH low in the past. US for further eval   - CBC WITH DIFFERENTIAL; Future  - COMP METABOLIC PANEL; Future  - TSH; Future  - FREE THYROXINE; Future  - THYROID PEROXIDASE  (TPO) AB; Future  - ANTITHYROGLOBULIN AB; Future  - TSI; Future  - US-SOFT TISSUES OF HEAD - NECK; Future    2. Dyslipidemia, goal LDL below 130  Continue to monitor. No need for treatment at this time. If cholesterol get better from thyroid condition treatment no need for statin   - CBC WITH DIFFERENTIAL; Future  - COMP METABOLIC PANEL; Future  -PROFILE; Future    3. ROMELIA (obstructive sleep apnea)  Follow up with [facility]   - CBC WITH DIFFERENTIAL; Future  - COMP METABOLIC PANEL; Future    4. Elevated fasting glucose  Continue to monitor   - CBC WITH DIFFERENTIAL; Future  - COMP METABOLIC PANEL; Future    5. Alkaline phosphatase elevation  Possible KAPADIA. Avoid etoh. Continue to monitor   - CBC WITH DIFFERENTIAL; Future  - COMP METABOLIC PANEL; Future    6. Arthritis  Rule our gout disease. Possible OA   - CBC WITH DIFFERENTIAL; Future  - COMP METABOLIC PANEL; Future  - URIC ACID, SERUM    7. Obesity (BMI 35.0-39.9 without comorbidity)  Counseling for a small portion, balanced diet, exercising for3-5 times per week.    - Patient identified as having weight management issue.   Appropriate orders and counseling given.  - CBC WITH DIFFERENTIAL; Future  - COMP METABOLIC PANEL; Future    8. RBBB  Benign condition. Continue to monitor   - CBC WITH DIFFERENTIAL; Future  - COMP METABOLIC PANEL; Future    9. Enlarged thyroid gland  US and lab work to follow   - CBC WITH DIFFERENTIAL; Future  - COMP METABOLIC PANEL; Future  - TSH; Future  - FREE THYROXINE; Future  - THYROID PEROXIDASE  (TPO) AB; Future  - ANTITHYROGLOBULIN AB; Future  - TSI; Future  - US-SOFT TISSUES OF HEAD - NECK; Future      Followup: Return in about 2 weeks (around 1/26/2018), or if symptoms worsen or fail to improve, for Short.      Signed by: You Arreguin M.D.

## 2018-01-13 NOTE — ASSESSMENT & PLAN NOTE
One time she had incidental findings of RBBB. She was told so. It has happened that anther time she had numbness on the left arm. She was seen at ER. She had stroke work up but everything was negative. She denies chest pain

## 2018-01-13 NOTE — ASSESSMENT & PLAN NOTE
She is being diagnosed recently with associated due to overweight. She is on CPAP machine. She reports compliance with that. She follows with [facility]

## 2018-01-18 ENCOUNTER — APPOINTMENT (OUTPATIENT)
Dept: RADIOLOGY | Facility: MEDICAL CENTER | Age: 63
End: 2018-01-18
Attending: INTERNAL MEDICINE
Payer: COMMERCIAL

## 2018-03-15 ENCOUNTER — APPOINTMENT (OUTPATIENT)
Dept: MEDICAL GROUP | Facility: PHYSICIAN GROUP | Age: 63
End: 2018-03-15

## 2018-05-24 ENCOUNTER — HOSPITAL ENCOUNTER (OUTPATIENT)
Dept: LAB | Facility: MEDICAL CENTER | Age: 63
End: 2018-05-24
Attending: FAMILY MEDICINE
Payer: COMMERCIAL

## 2018-05-24 LAB
25(OH)D3 SERPL-MCNC: 24 NG/ML (ref 30–100)
ALBUMIN SERPL BCP-MCNC: 4.2 G/DL (ref 3.2–4.9)
ALBUMIN/GLOB SERPL: 1.6 G/DL
ALP SERPL-CCNC: 115 U/L (ref 30–99)
ALT SERPL-CCNC: 17 U/L (ref 2–50)
ANION GAP SERPL CALC-SCNC: 9 MMOL/L (ref 0–11.9)
APPEARANCE UR: CLEAR
AST SERPL-CCNC: 17 U/L (ref 12–45)
BACTERIA #/AREA URNS HPF: NEGATIVE /HPF
BASOPHILS # BLD AUTO: 0.6 % (ref 0–1.8)
BASOPHILS # BLD: 0.04 K/UL (ref 0–0.12)
BILIRUB SERPL-MCNC: 0.4 MG/DL (ref 0.1–1.5)
BILIRUB UR QL STRIP.AUTO: NEGATIVE
BUN SERPL-MCNC: 10 MG/DL (ref 8–22)
CALCIUM SERPL-MCNC: 9.4 MG/DL (ref 8.5–10.5)
CHLORIDE SERPL-SCNC: 108 MMOL/L (ref 96–112)
CHOLEST SERPL-MCNC: 202 MG/DL (ref 100–199)
CO2 SERPL-SCNC: 23 MMOL/L (ref 20–33)
COLOR UR: YELLOW
CREAT SERPL-MCNC: 0.71 MG/DL (ref 0.5–1.4)
EOSINOPHIL # BLD AUTO: 0.17 K/UL (ref 0–0.51)
EOSINOPHIL NFR BLD: 2.5 % (ref 0–6.9)
EPI CELLS #/AREA URNS HPF: NEGATIVE /HPF
ERYTHROCYTE [DISTWIDTH] IN BLOOD BY AUTOMATED COUNT: 45.5 FL (ref 35.9–50)
FERRITIN SERPL-MCNC: 71.7 NG/ML (ref 10–291)
GLOBULIN SER CALC-MCNC: 2.7 G/DL (ref 1.9–3.5)
GLUCOSE SERPL-MCNC: 108 MG/DL (ref 65–99)
GLUCOSE UR STRIP.AUTO-MCNC: NEGATIVE MG/DL
HCT VFR BLD AUTO: 44.5 % (ref 37–47)
HDLC SERPL-MCNC: 64 MG/DL
HGB BLD-MCNC: 14.4 G/DL (ref 12–16)
HYALINE CASTS #/AREA URNS LPF: NORMAL /LPF
IMM GRANULOCYTES # BLD AUTO: 0.03 K/UL (ref 0–0.11)
IMM GRANULOCYTES NFR BLD AUTO: 0.4 % (ref 0–0.9)
IRON SATN MFR SERPL: 24 % (ref 15–55)
IRON SERPL-MCNC: 96 UG/DL (ref 40–170)
KETONES UR STRIP.AUTO-MCNC: NEGATIVE MG/DL
LDLC SERPL CALC-MCNC: 112 MG/DL
LEUKOCYTE ESTERASE UR QL STRIP.AUTO: ABNORMAL
LYMPHOCYTES # BLD AUTO: 2.04 K/UL (ref 1–4.8)
LYMPHOCYTES NFR BLD: 30.3 % (ref 22–41)
MCH RBC QN AUTO: 30.5 PG (ref 27–33)
MCHC RBC AUTO-ENTMCNC: 32.4 G/DL (ref 33.6–35)
MCV RBC AUTO: 94.3 FL (ref 81.4–97.8)
MICRO URNS: ABNORMAL
MONOCYTES # BLD AUTO: 0.54 K/UL (ref 0–0.85)
MONOCYTES NFR BLD AUTO: 8 % (ref 0–13.4)
NEUTROPHILS # BLD AUTO: 3.92 K/UL (ref 2–7.15)
NEUTROPHILS NFR BLD: 58.2 % (ref 44–72)
NITRITE UR QL STRIP.AUTO: NEGATIVE
NRBC # BLD AUTO: 0 K/UL
NRBC BLD-RTO: 0 /100 WBC
PH UR STRIP.AUTO: 6.5 [PH]
PLATELET # BLD AUTO: 222 K/UL (ref 164–446)
PMV BLD AUTO: 12.4 FL (ref 9–12.9)
POTASSIUM SERPL-SCNC: 3.9 MMOL/L (ref 3.6–5.5)
PROT SERPL-MCNC: 6.9 G/DL (ref 6–8.2)
PROT UR QL STRIP: NEGATIVE MG/DL
RBC # BLD AUTO: 4.72 M/UL (ref 4.2–5.4)
RBC # URNS HPF: NORMAL /HPF
RBC UR QL AUTO: NEGATIVE
SODIUM SERPL-SCNC: 140 MMOL/L (ref 135–145)
SP GR UR STRIP.AUTO: 1
T3FREE SERPL-MCNC: 4.22 PG/ML (ref 2.4–4.2)
T4 FREE SERPL-MCNC: 1.02 NG/DL (ref 0.53–1.43)
TIBC SERPL-MCNC: 405 UG/DL (ref 250–450)
TRIGL SERPL-MCNC: 129 MG/DL (ref 0–149)
TSH SERPL DL<=0.005 MIU/L-ACNC: 0.09 UIU/ML (ref 0.38–5.33)
UROBILINOGEN UR STRIP.AUTO-MCNC: 0.2 MG/DL
VIT B12 SERPL-MCNC: 710 PG/ML (ref 211–911)
WBC # BLD AUTO: 6.7 K/UL (ref 4.8–10.8)
WBC #/AREA URNS HPF: NORMAL /HPF

## 2018-05-24 PROCEDURE — 83540 ASSAY OF IRON: CPT

## 2018-05-24 PROCEDURE — 81001 URINALYSIS AUTO W/SCOPE: CPT

## 2018-05-24 PROCEDURE — 82607 VITAMIN B-12: CPT

## 2018-05-24 PROCEDURE — 80053 COMPREHEN METABOLIC PANEL: CPT

## 2018-05-24 PROCEDURE — 82728 ASSAY OF FERRITIN: CPT

## 2018-05-24 PROCEDURE — 36415 COLL VENOUS BLD VENIPUNCTURE: CPT

## 2018-05-24 PROCEDURE — 80061 LIPID PANEL: CPT

## 2018-05-24 PROCEDURE — 83550 IRON BINDING TEST: CPT

## 2018-05-24 PROCEDURE — 85025 COMPLETE CBC W/AUTO DIFF WBC: CPT

## 2018-05-24 PROCEDURE — 84439 ASSAY OF FREE THYROXINE: CPT

## 2018-05-24 PROCEDURE — 84443 ASSAY THYROID STIM HORMONE: CPT

## 2018-05-24 PROCEDURE — 84481 FREE ASSAY (FT-3): CPT

## 2018-05-24 PROCEDURE — 86800 THYROGLOBULIN ANTIBODY: CPT

## 2018-05-24 PROCEDURE — 82306 VITAMIN D 25 HYDROXY: CPT

## 2018-05-26 LAB — THYROGLOB AB SERPL-ACNC: 409.4 IU/ML (ref 0–4)

## 2018-05-31 ENCOUNTER — HOSPITAL ENCOUNTER (OUTPATIENT)
Dept: RADIOLOGY | Facility: MEDICAL CENTER | Age: 63
End: 2018-05-31
Attending: FAMILY MEDICINE
Payer: COMMERCIAL

## 2018-05-31 DIAGNOSIS — E04.1 NONTOXIC UNINODULAR GOITER: ICD-10-CM

## 2018-05-31 PROCEDURE — 76536 US EXAM OF HEAD AND NECK: CPT

## 2018-07-31 ENCOUNTER — OFFICE VISIT (OUTPATIENT)
Dept: ENDOCRINOLOGY | Facility: MEDICAL CENTER | Age: 63
End: 2018-07-31
Payer: COMMERCIAL

## 2018-07-31 VITALS
OXYGEN SATURATION: 97 % | DIASTOLIC BLOOD PRESSURE: 72 MMHG | HEART RATE: 83 BPM | BODY MASS INDEX: 36.44 KG/M2 | HEIGHT: 62 IN | SYSTOLIC BLOOD PRESSURE: 110 MMHG | WEIGHT: 198 LBS

## 2018-07-31 DIAGNOSIS — Z78.0 MENOPAUSE: ICD-10-CM

## 2018-07-31 DIAGNOSIS — R53.83 FATIGUE, UNSPECIFIED TYPE: ICD-10-CM

## 2018-07-31 DIAGNOSIS — R63.5 WEIGHT GAIN: ICD-10-CM

## 2018-07-31 DIAGNOSIS — E05.20 GOITER, TOXIC, MULTINODULAR: ICD-10-CM

## 2018-07-31 DIAGNOSIS — R14.0 ABDOMINAL BLOATING: ICD-10-CM

## 2018-07-31 DIAGNOSIS — R79.89 LOW TSH LEVEL: ICD-10-CM

## 2018-07-31 DIAGNOSIS — E55.9 VITAMIN D DEFICIENCY: ICD-10-CM

## 2018-07-31 PROCEDURE — 99205 OFFICE O/P NEW HI 60 MIN: CPT | Performed by: INTERNAL MEDICINE

## 2018-07-31 RX ORDER — ERGOCALCIFEROL 1.25 MG/1
50000 CAPSULE ORAL
Qty: 12 CAP | Refills: 3 | Status: SHIPPED
Start: 2018-07-31 | End: 2020-01-24

## 2018-07-31 RX ORDER — ESTRADIOL 1 MG/1
1 TABLET ORAL DAILY
Qty: 30 TAB | Refills: 3 | Status: SHIPPED | OUTPATIENT
Start: 2018-07-31 | End: 2018-10-03

## 2018-07-31 NOTE — PROGRESS NOTES
New Patient Consult Note  Primary care physician: Lizeth Crowell M.D.    Reason for consult: Weight gain, abnormal thyroid function test and Hashimoto's thyroiditis    HPI:  Lashae Hoff is a 63 y.o. old patient who comes in today for evaluation of weight gain of 60 pounds in the last year or so.  She also has a diagnosis of Hashimoto's thyroiditis and would like to know how this impacts her health overall.  She is menopausal since long time.  She does have intact uterus and ovaries.  She also complains of fogginess of the brain.  She does feel fatigued and tired at all times.    ROS:  Constitutional: Weight gain, fogginess of the brain, fatigue, no weight loss  Cardiac: No palpitations or racing heart  Resp: No shortness of breath  Neuro: No numbness or tinging in feet  Endo: No heat or cold intolerance, no polyuria or polydipsia  All other systems were reviewed and were negative.    Past Medical History:  Patient Active Problem List    Diagnosis Date Noted   • Alkaline phosphatase elevation 01/12/2018   • Arthritis 01/12/2018   • Obesity (BMI 35.0-39.9 without comorbidity) ([facility]) 01/12/2018   • RBBB 01/12/2018   • Enlarged thyroid gland 01/12/2018   • ROMELIA (obstructive sleep apnea) 07/28/2016   • Hashimoto's thyroiditis 07/15/2016   • Elevated fasting glucose 07/15/2016   • Dyslipidemia, goal LDL below 130        Past Surgical History:  Past Surgical History:   Procedure Laterality Date   • BRAD BY LAPAROSCOPY  2013   • TONSILLECTOMY         Allergies:  Patient has no known allergies.    Social History:  Social History     Social History   • Marital status:      Spouse name: N/A   • Number of children: N/A   • Years of education: N/A     Occupational History   • Not on file.     Social History Main Topics   • Smoking status: Former Smoker     Packs/day: 1.00     Years: 2.00     Types: Cigarettes     Quit date: 7/6/1986   • Smokeless tobacco: Never Used   • Alcohol use 3.0 oz/week     5 Glasses  "of wine per week   • Drug use: No   • Sexual activity: Not Currently     Other Topics Concern   • Not on file     Social History Narrative   • No narrative on file       Family History:  Family History   Problem Relation Age of Onset   • Hypertension Mother    • Diabetes Mother         typr II   • Hyperlipidemia Mother    • Cancer Father         throat   • Arthritis Father         gout   • Diabetes Brother        Medications:    Current Outpatient Prescriptions:   •  vitamin D, Ergocalciferol, (DRISDOL) 22360 units Cap capsule, Take 1 Cap by mouth every 7 days., Disp: 12 Cap, Rfl: 3  •  estradiol (ESTRACE) 1 MG Tab, Take 1 Tab by mouth every day., Disp: 30 Tab, Rfl: 3  •  progesterone (PROMETRIUM) 100 MG Cap, Take 1 Cap by mouth every day., Disp: 30 Cap, Rfl: 3  •  Probiotic Product (PROBIOTIC DAILY PO), Take  by mouth., Disp: , Rfl:     Labs: Reviewed    Physical Examination:  Vital signs: /72   Pulse 83   Ht 1.575 m (5' 2.01\")   Wt 89.8 kg (198 lb)   SpO2 97%   BMI 36.21 kg/m²  Body mass index is 36.21 kg/m².  General: No apparent distress, cooperative  Eyes: No scleral icterus or discharge  ENMT: Normal on external inspection of nose, lips, normal thyroid exam  Neck: No abnormal masses on inspection  Resp: Normal effort, clear to auscultation bilaterally   CVS: Regular rate and rhythm, S1 S2 normal, no murmur   Extremities: No edema  Abdomen: abdominal obesity present  Neuro: Alert and oriented  Skin: No rash  Psych: Normal mood and affect, intact memory and able to make informed decisions    Assessment and Plan:    1. Fatigue, unspecified type  Rule out hypothyroidism as the contributory factor for fatigue.  - TSH; Future  - TRIIDOTHYRONINE; Future  - FREE THYROXINE; Future  - T3 FREE; Future  - THYROTROPIN RECEP AB; Future    2. Vitamin D deficiency  Start   - vitamin D, Ergocalciferol, (DRISDOL) 55311 units Cap capsule; Take 1 Cap by mouth every 7 days.  Dispense: 12 Cap; Refill: 3    Her low " vitamin D levels could be contributing to fatigue    3. Menopause  Start   - estradiol (ESTRACE) 1 MG Tab; Take 1 Tab by mouth every day.  Dispense: 30 Tab; Refill: 3  - progesterone (PROMETRIUM) 100 MG Cap; Take 1 Cap by mouth every day.  Dispense: 30 Cap; Refill: 3    4. Weight gain  Advised in detail on dietary and exercise measures for weight loss including using my fitness pal program.    5. Goiter, toxic, multinodular  - IR-FINE NEEDLE ASPIR W/GUIDE(FL); Future the 2 dominant nodules one on the left and the other on the right side to rule out cancer.    6. Low TSH level  Repeat thyroid panel for further evaluation    7. Abdominal bloating  Rule out celiac disease  - CELIAC DISEASE AB PANEL; Future    Return in about 2 months (around 9/30/2018).    Total face to face time spent with patient equals 60 minutes. 35/60 minutes were spent on counseling the patient about the pathophysiology of fatigue,,pituitary-thyroid axis, pituitary-adrenal axis, pituitary-gonadal axis, natural history of thyroid nodules, side effects and benefits of estradiol, progesterone, Vit D and Vit B12 replacement.    Thank you for allowing me to participate in the care of this patient.    Jaylen Brewster M.D.  07/31/18    CC:   Lizeth Crowell M.D.    This note was created using voice recognition software (Dragon). The accuracy of the dictation is limited by the abilities of the software. I have reviewed the note prior to signing, however some errors in grammar and context are still possible. If you have any questions related to this note please do not hesitate to contact our office.

## 2018-08-13 ENCOUNTER — SLEEP CENTER VISIT (OUTPATIENT)
Dept: SLEEP MEDICINE | Facility: MEDICAL CENTER | Age: 63
End: 2018-08-13
Payer: COMMERCIAL

## 2018-08-13 VITALS
HEART RATE: 89 BPM | RESPIRATION RATE: 16 BRPM | HEIGHT: 62 IN | WEIGHT: 196 LBS | SYSTOLIC BLOOD PRESSURE: 110 MMHG | OXYGEN SATURATION: 96 % | DIASTOLIC BLOOD PRESSURE: 68 MMHG | BODY MASS INDEX: 36.07 KG/M2

## 2018-08-13 DIAGNOSIS — E06.3 HASHIMOTO'S THYROIDITIS: ICD-10-CM

## 2018-08-13 DIAGNOSIS — E66.9 OBESITY (BMI 35.0-39.9 WITHOUT COMORBIDITY): ICD-10-CM

## 2018-08-13 DIAGNOSIS — Z87.891 FORMER SMOKER: ICD-10-CM

## 2018-08-13 DIAGNOSIS — E78.5 DYSLIPIDEMIA, GOAL LDL BELOW 130: ICD-10-CM

## 2018-08-13 DIAGNOSIS — G47.33 OSA (OBSTRUCTIVE SLEEP APNEA): Chronic | ICD-10-CM

## 2018-08-13 DIAGNOSIS — R53.83 FATIGUE, UNSPECIFIED TYPE: ICD-10-CM

## 2018-08-13 PROCEDURE — 99214 OFFICE O/P EST MOD 30 MIN: CPT | Performed by: NURSE PRACTITIONER

## 2018-08-13 NOTE — PROGRESS NOTES
Chief Complaint   Patient presents with   • Annual Exam       HPI:  Lashae Hoff is a 63 y.o. year old female here today for follow-up on ROMELIA.  Patient is overdue for annual visit.  PSG indicated AHI 28.7 and a minimum saturation of 79%.  Patient is compliant with CPAP 9cm H2O nightly.  Prior titration study showed normal oximetry on this setting.  Compliance card 7/14/2018 through 8/12/2018 indicates 100% compliance, average nightly use of 8.5 hours, no significant mask leaking and an overall AHI of 0.7.  Patient tolerates mask and pressure well.  She notes continuing to be fatigued even though using machine 8.5 hours at night.  She says she can sleep for 10 hours and still is unrested.  She is currently being followed by an endocrinologist for her Hashimoto's thyroiditis which appears to be well controlled.  They are unsure if menopause could be causing her symptoms and weight gain.  She has gained 26 pounds since her last sleep study.  Reviewed findings with patient.    She denies cardiac arrest or symptoms.  She is placed on Prilosec daily by GI due to recent finding on an endoscopy of an ulcer.          ROS: As per HPI and otherwise negative if not stated.    Past Medical History:   Diagnosis Date   • Arthritis of knee, right     ?   • Bruxism    • Chickenpox    • Dyslipidemia, goal LDL below 130    • Elevated fasting glucose    • GERD (gastroesophageal reflux disease)    • History of asthma    • History of hyperthyroidism     hachimoto's thyroiditis   • Snoring        Past Surgical History:   Procedure Laterality Date   • BRAD BY LAPAROSCOPY  2013   • TONSILLECTOMY         Family History   Problem Relation Age of Onset   • Hypertension Mother    • Diabetes Mother         typr II   • Hyperlipidemia Mother    • Cancer Father         throat   • Arthritis Father         gout   • Diabetes Brother        Social History     Social History   • Marital status:      Spouse name: N/A   • Number of  "children: N/A   • Years of education: N/A     Occupational History   • Not on file.     Social History Main Topics   • Smoking status: Former Smoker     Packs/day: 1.00     Years: 2.00     Types: Cigarettes     Quit date: 7/6/1986   • Smokeless tobacco: Never Used   • Alcohol use 3.0 oz/week     5 Glasses of wine per week      Comment: occ   • Drug use: No   • Sexual activity: Not Currently     Other Topics Concern   • Not on file     Social History Narrative   • No narrative on file       Allergies as of 08/13/2018   • (No Known Allergies)        @Vital signs for this encounter:  Vitals:    08/13/18 1524   Height: 1.575 m (5' 2.01\")   Weight: 88.9 kg (196 lb)   Weight % change since last entry.: 0 %   BP: 110/68   Pulse: 89   BMI (Calculated): 35.84   Resp: 16   O2 sat % room air: 96 %       Current medications as of today   Current Outpatient Prescriptions   Medication Sig Dispense Refill   • vitamin D, Ergocalciferol, (DRISDOL) 86615 units Cap capsule Take 1 Cap by mouth every 7 days. 12 Cap 3   • estradiol (ESTRACE) 1 MG Tab Take 1 Tab by mouth every day. 30 Tab 3   • progesterone (PROMETRIUM) 100 MG Cap Take 1 Cap by mouth every day. 30 Cap 3   • Probiotic Product (PROBIOTIC DAILY PO) Take  by mouth.       No current facility-administered medications for this visit.          Physical Exam:   Gen:           Alert and oriented, No apparent distress. Mood and affect appropriate, normal interaction with examiner.  Eyes:          PERRL, EOM intact, sclere white, conjunctive moist.  Ears:          Not examined.   Hearing:     Grossly intact.  Nose:          Normal, no lesions or deformities.  Dentition:    Good dentition.  Oropharynx:   Tongue normal, posterior pharynx without erythema or exudate.  Mallampati Classification: not examined.  Neck:        Supple, trachea midline, no masses.  Respiratory Effort: No intercostal retractions or use of accessory muscles.   Lung Auscultation:      Clear to auscultation " bilaterally; no rales, rhonchi or wheezing.  CV:            Regular rate and rhythm. No murmurs, rubs or gallops.  Abd:           Not examined.   Lymphadenopathy: Not examined.  Gait and Station: Normal.  Digits and Nails: No clubbing, cyanosis, petechiae, or nodes.   Cranial Nerves: II-XII grossly intact.  Skin:        No rashes, lesions or ulcers noted.               Ext:           No cyanosis or edema.      Assessment:  1. ROMELIA (obstructive sleep apnea)  DME CNOX BY DME CO    DME MASK AND SUPPLIES   2. Former smoker     3. Obesity (BMI 35.0-39.9 without comorbidity) ([facility])     4. Hashimoto's thyroiditis     5. Dyslipidemia, goal LDL below 130     6. Fatigue, unspecified type  DME CNOX BY DME CO       Immunizations:    Flu:not given  Pneumovax 23:not given  Prevnar 13:not given    Plan:  1.  Continue CPAP nightly.  DME mask/supplies.  DME CNOX on Pap now.  May call results.  2.  Discussed sleep hygiene.  3.  Encourage weight loss.  4.  Follow-up in 1 year with complaints card, sooner if needed

## 2018-08-30 ENCOUNTER — HOSPITAL ENCOUNTER (OUTPATIENT)
Dept: RADIOLOGY | Facility: MEDICAL CENTER | Age: 63
End: 2018-08-30
Attending: INTERNAL MEDICINE
Payer: COMMERCIAL

## 2018-08-30 DIAGNOSIS — E05.20 GOITER, TOXIC, MULTINODULAR: ICD-10-CM

## 2018-08-30 PROCEDURE — 700111 HCHG RX REV CODE 636 W/ 250 OVERRIDE (IP)

## 2018-08-30 PROCEDURE — 10022 IR-FINE NEEDLE ASPIR W/GUIDE(FL): CPT

## 2018-08-30 PROCEDURE — 88112 CYTOPATH CELL ENHANCE TECH: CPT

## 2018-08-30 RX ORDER — LIDOCAINE HYDROCHLORIDE 10 MG/ML
INJECTION, SOLUTION EPIDURAL; INFILTRATION; INTRACAUDAL; PERINEURAL
Status: COMPLETED
Start: 2018-08-30 | End: 2018-08-30

## 2018-08-30 RX ADMIN — LIDOCAINE HYDROCHLORIDE: 10 INJECTION, SOLUTION EPIDURAL; INFILTRATION; INTRACAUDAL; PERINEURAL at 10:15

## 2018-08-30 NOTE — PROGRESS NOTES
"Patient given [facility] \"Preventing the Spread of Infection\" Brochure upon being checked in.     US guided right thyroid nodule fine needle aspiration done by Dr. Vega; NON-SEDATION  (no H&P required as this is a NON SEDATION procedure); right anterior aspect of neck access site; procedural RN: Alexander; 1 jar of cytolyt obtained and sent to pathology lab; pt tolerated the procedure well; pt remained stable pre/intra/post procedure; all questions and concerns answered prior to being d/c; patient provided with appropriate education for procedure; pt d/c home.     "

## 2018-09-13 ENCOUNTER — HOSPITAL ENCOUNTER (OUTPATIENT)
Dept: LAB | Facility: MEDICAL CENTER | Age: 63
End: 2018-09-13
Attending: INTERNAL MEDICINE
Payer: COMMERCIAL

## 2018-09-13 DIAGNOSIS — R79.89 LOW TSH LEVEL: ICD-10-CM

## 2018-09-13 DIAGNOSIS — R53.83 FATIGUE, UNSPECIFIED TYPE: ICD-10-CM

## 2018-09-13 DIAGNOSIS — R14.0 ABDOMINAL BLOATING: ICD-10-CM

## 2018-09-13 LAB
T3 SERPL-MCNC: 147.9 NG/DL (ref 60–181)
T3FREE SERPL-MCNC: 3.65 PG/ML (ref 2.4–4.2)
T4 FREE SERPL-MCNC: 1.04 NG/DL (ref 0.53–1.43)
TSH SERPL DL<=0.005 MIU/L-ACNC: 0.07 UIU/ML (ref 0.38–5.33)

## 2018-09-13 PROCEDURE — 83516 IMMUNOASSAY NONANTIBODY: CPT

## 2018-09-13 PROCEDURE — 83520 IMMUNOASSAY QUANT NOS NONAB: CPT

## 2018-09-13 PROCEDURE — 84439 ASSAY OF FREE THYROXINE: CPT

## 2018-09-13 PROCEDURE — 36415 COLL VENOUS BLD VENIPUNCTURE: CPT

## 2018-09-13 PROCEDURE — 84481 FREE ASSAY (FT-3): CPT

## 2018-09-13 PROCEDURE — 84443 ASSAY THYROID STIM HORMONE: CPT

## 2018-09-13 PROCEDURE — 84480 ASSAY TRIIODOTHYRONINE (T3): CPT

## 2018-09-13 PROCEDURE — 82784 ASSAY IGA/IGD/IGG/IGM EACH: CPT

## 2018-09-16 LAB
IGA SERPL-MCNC: 119 MG/DL (ref 68–408)
TSH RECEP AB SER-ACNC: <0.9 IU/L

## 2018-09-17 LAB — TTG IGA SER IA-ACNC: 0 U/ML (ref 0–3)

## 2018-09-20 ENCOUNTER — TELEPHONE (OUTPATIENT)
Dept: PULMONOLOGY | Facility: HOSPICE | Age: 63
End: 2018-09-20

## 2018-09-20 NOTE — TELEPHONE ENCOUNTER
----- Message from ARACELIS Berry sent at 9/17/2018 12:09 PM PDT -----  CNOX indicates persistent low oxygen levels at night on CPAP. Recommend increasing pressure versus in lab titration study. Please inform patient.

## 2018-09-21 DIAGNOSIS — G47.33 OSA (OBSTRUCTIVE SLEEP APNEA): ICD-10-CM

## 2018-09-26 NOTE — TELEPHONE ENCOUNTER
Left detailed message for pt to callback, I did advise that I am sending orders to [facility] for them to contact her in regards to doing a Cnox and to increase Cpap pressures.

## 2018-10-03 ENCOUNTER — OFFICE VISIT (OUTPATIENT)
Dept: ENDOCRINOLOGY | Facility: MEDICAL CENTER | Age: 63
End: 2018-10-03
Payer: COMMERCIAL

## 2018-10-03 VITALS
HEIGHT: 62 IN | BODY MASS INDEX: 35.74 KG/M2 | SYSTOLIC BLOOD PRESSURE: 118 MMHG | DIASTOLIC BLOOD PRESSURE: 74 MMHG | OXYGEN SATURATION: 93 % | HEART RATE: 102 BPM | WEIGHT: 194.2 LBS

## 2018-10-03 DIAGNOSIS — E55.9 VITAMIN D DEFICIENCY: ICD-10-CM

## 2018-10-03 DIAGNOSIS — E05.20 GOITER, TOXIC, MULTINODULAR: ICD-10-CM

## 2018-10-03 DIAGNOSIS — R79.89 LOW TSH LEVEL: ICD-10-CM

## 2018-10-03 DIAGNOSIS — Z78.0 MENOPAUSE: ICD-10-CM

## 2018-10-03 PROCEDURE — 99214 OFFICE O/P EST MOD 30 MIN: CPT | Performed by: INTERNAL MEDICINE

## 2018-10-03 NOTE — PROGRESS NOTES
"Endocrinology Clinic Progress Note  PCP: Lizeth Crowell M.D.    HPI:  Lashae Hoff is a 63 y.o. old patient who comes in today for review of endocrine problems.  Low TSH: no symptoms of either hypo or hyperthyroidism  Vit d def: on 50,000 units once a week.   Menopause: stopped estradiol and progesterone after one month due to cramping from it.     Her sleep apnea treatment is being optimized.    ROS:  Constitutional: No unintentional weight loss  Endo: Denies excessive thirst or frequent urination  All other systems were reviewed and were negative.    Past Medical History:  Patient Active Problem List    Diagnosis Date Noted   • Alkaline phosphatase elevation 01/12/2018   • Arthritis 01/12/2018   • Obesity (BMI 35.0-39.9 without comorbidity) ([facility]) 01/12/2018   • RBBB 01/12/2018   • Enlarged thyroid gland 01/12/2018   • ROMELIA (obstructive sleep apnea) 07/28/2016   • Hashimoto's thyroiditis 07/15/2016   • Elevated fasting glucose 07/15/2016   • Dyslipidemia, goal LDL below 130        Medications:    Current Outpatient Prescriptions:   •  vitamin D, Ergocalciferol, (DRISDOL) 37060 units Cap capsule, Take 1 Cap by mouth every 7 days., Disp: 12 Cap, Rfl: 3  •  Probiotic Product (PROBIOTIC DAILY PO), Take  by mouth., Disp: , Rfl:     Labs: Reviewed    Physical Examination:  Vital signs: /74 (BP Location: Right arm)   Pulse (!) 102   Ht 1.575 m (5' 2\")   Wt 88.1 kg (194 lb 3.2 oz)   SpO2 93%   BMI 35.52 kg/m²  Body mass index is 35.52 kg/m².  General: No apparent distress, cooperative  Eyes: No scleral icterus, no discharge, normal eyelids  Neck: No abnormal masses on inspection, normal thyroid exam  Resp: Normal effort, clear to auscultation bilaterally  CVS: Regular rate and rhythm, S1 S2 normal, no murmur  Extremities: No lower extremity edema  Abdomen: abdominal obesity present  Musculoskeletal: Normal digits and nails  Skin: No rash on visible skin  Psych: Alert and oriented, normal mood and " affect, intact memory and able to make informed decisions.    Assessment and Plan:    1. Goiter, toxic, multinodular  Stable. FNAC of dominant nodule is negative for cancer.     2. Low TSH level  Unclear etiology; monitor for now.  - TSH; Future  - TRIIDOTHYRONINE; Future  - FREE THYROXINE; Future  - T3 FREE; Future    3. Menopause  Did not tolerate estradiol and progesterone.    4. Vitamin D deficiency  Cont. Vit D supplementation.   - VITAMIN D,25 HYDROXY; Future    Return in about 3 months (around 1/3/2019).    Thank you for allowing me to participate in the care of this patient.    Jaylen Brewster M.D.    CC:   Lizeth Crowell M.D.    This note was created using voice recognition software (Dragon). The accuracy of the dictation is limited by the abilities of the software. I have reviewed the note prior to signing, however some errors in grammar and context are still possible. If you have any questions related to this note please do not hesitate to contact our office.

## 2018-10-04 ENCOUNTER — TELEPHONE (OUTPATIENT)
Dept: PULMONOLOGY | Facility: HOSPICE | Age: 63
End: 2018-10-04

## 2018-10-04 DIAGNOSIS — G47.33 OSA (OBSTRUCTIVE SLEEP APNEA): ICD-10-CM

## 2018-10-04 NOTE — TELEPHONE ENCOUNTER
Most recent pressure increase order was to change to 11-15 cm but pt's current machine does not have Auto capabilities.     Per last OV notes her machine was set at 9    Please advise

## 2018-11-08 ENCOUNTER — HOSPITAL ENCOUNTER (OUTPATIENT)
Facility: MEDICAL CENTER | Age: 63
End: 2018-11-08
Payer: COMMERCIAL

## 2018-11-08 LAB
BDY FAT % MEASURED: 45.9 %
BP DIAS: 72 MMHG
BP SYS: 120 MMHG
DIABETES HTDIA: NO
EVENT NAME HTEVT: NORMAL
HYPERTENSION HTHYP: NO
SCREENING LOC CITY HTCIT: NORMAL
SCREENING LOC STATE HTSTA: NORMAL
SCREENING LOCATION HTLOC: NORMAL
SUBSCRIBER ID HTSID: NORMAL

## 2018-11-09 LAB
CHOLEST SERPL-MCNC: 233 MG/DL (ref 100–199)
FASTING STATUS PATIENT QL REPORTED: NORMAL
GLUCOSE SERPL-MCNC: 99 MG/DL (ref 65–99)
HDLC SERPL-MCNC: 60 MG/DL
LDLC SERPL CALC-MCNC: 139 MG/DL
TRIGL SERPL-MCNC: 168 MG/DL (ref 0–149)

## 2019-04-29 ENCOUNTER — APPOINTMENT (OUTPATIENT)
Dept: DERMATOLOGY | Facility: IMAGING CENTER | Age: 64
End: 2019-04-29

## 2019-05-22 ENCOUNTER — APPOINTMENT (OUTPATIENT)
Dept: DERMATOLOGY | Facility: IMAGING CENTER | Age: 64
End: 2019-05-22

## 2019-08-12 ENCOUNTER — SLEEP CENTER VISIT (OUTPATIENT)
Dept: SLEEP MEDICINE | Facility: MEDICAL CENTER | Age: 64
End: 2019-08-12
Payer: COMMERCIAL

## 2019-08-12 VITALS
SYSTOLIC BLOOD PRESSURE: 130 MMHG | HEART RATE: 101 BPM | WEIGHT: 167 LBS | OXYGEN SATURATION: 95 % | BODY MASS INDEX: 30.73 KG/M2 | RESPIRATION RATE: 16 BRPM | DIASTOLIC BLOOD PRESSURE: 78 MMHG | HEIGHT: 62 IN

## 2019-08-12 DIAGNOSIS — G47.33 OSA (OBSTRUCTIVE SLEEP APNEA): Chronic | ICD-10-CM

## 2019-08-12 DIAGNOSIS — R63.4 WEIGHT LOSS: ICD-10-CM

## 2019-08-12 DIAGNOSIS — Z87.891 FORMER SMOKER: ICD-10-CM

## 2019-08-12 DIAGNOSIS — E06.3 HASHIMOTO'S THYROIDITIS: ICD-10-CM

## 2019-08-12 PROBLEM — E66.9 OBESITY (BMI 35.0-39.9 WITHOUT COMORBIDITY): Status: RESOLVED | Noted: 2018-01-12 | Resolved: 2019-08-12

## 2019-08-12 PROCEDURE — 99214 OFFICE O/P EST MOD 30 MIN: CPT | Performed by: NURSE PRACTITIONER

## 2019-08-12 NOTE — PROGRESS NOTES
Chief Complaint   Patient presents with   • Apnea     Annual/ Compliance    • Results     OPO 11/26/18       HPI:  Lashae Hoff is a 64 y.o. year old female here today for annual follow-up on ROMELIA.   PSG indicated AHI 28.7 and a minimum saturation of 79%.  Patient  was started on CPAP 9cm H2O nightly. Updated OPO 9/11/18 indicated low O2 levels, so pressures empirically increased to 11cm.  Repeat OPO 11/15/18 indicated only 5.4min <89% and basal spo2 92.3%.  Compliance card 7/9/2019 through 8/7/2019 indicates 100% compliance, average nightly use of 7 hours 43 minutes, minimal mask leaking with an overall AHI of 0.3.  She notes nasal mask to be rubbing and leaking due to weight loss. She is also breathing through her mouth and waking with dry mouth. She has had occasional sinus headaches as well and feels pressure may be too high now. She notes energy levels during the day to be good.    BMI 30.  Patient has lost 29 pounds since last office visit last year. She is doing keto diet and attempting 25lbs more of weight loss.  She denies cardiac or respiratory symptoms.  She retired from [employer] after 28 years.  She is happy.    ROS: As per HPI and otherwise negative if not stated.    Past Medical History:   Diagnosis Date   • Arthritis of knee, right     ?   • Bruxism    • Chickenpox    • Dyslipidemia, goal LDL below 130    • Elevated fasting glucose    • GERD (gastroesophageal reflux disease)    • History of asthma    • History of hyperthyroidism     hachimoto's thyroiditis   • Snoring        Past Surgical History:   Procedure Laterality Date   • BRAD BY LAPAROSCOPY  2013   • TONSILLECTOMY         Family History   Problem Relation Age of Onset   • Hypertension Mother    • Diabetes Mother         typr II   • Hyperlipidemia Mother    • Cancer Father         throat   • Arthritis Father         gout   • Diabetes Brother        Social History     Socioeconomic History   • Marital status:      Spouse name: Not  "on file   • Number of children: Not on file   • Years of education: Not on file   • Highest education level: Not on file   Occupational History   • Not on file   Social Needs   • Financial resource strain: Not on file   • Food insecurity:     Worry: Not on file     Inability: Not on file   • Transportation needs:     Medical: Not on file     Non-medical: Not on file   Tobacco Use   • Smoking status: Former Smoker     Packs/day: 1.00     Years: 2.00     Pack years: 2.00     Types: Cigarettes     Last attempt to quit: 1986     Years since quittin.1   • Smokeless tobacco: Never Used   Substance and Sexual Activity   • Alcohol use: Yes     Alcohol/week: 3.0 oz     Types: 5 Glasses of wine per week     Comment: occ   • Drug use: No   • Sexual activity: Not Currently   Lifestyle   • Physical activity:     Days per week: Not on file     Minutes per session: Not on file   • Stress: Not on file   Relationships   • Social connections:     Talks on phone: Not on file     Gets together: Not on file     Attends Roman [employer] service: Not on file     Active member of club or organization: Not on file     Attends meetings of clubs or organizations: Not on file     Relationship status: Not on file   • Intimate partner violence:     Fear of current or ex partner: Not on file     Emotionally abused: Not on file     Physically abused: Not on file     Forced sexual activity: Not on file   Other Topics Concern   • Not on file   Social History Narrative   • Not on file       Allergies as of 2019   • (No Known Allergies)        Vitals:  /78   Pulse (!) 101   Resp 16   Ht 1.575 m (5' 2\")   Wt 75.8 kg (167 lb)   SpO2 95%     Current medications as of today   Current Outpatient Medications   Medication Sig Dispense Refill   • vitamin D, Ergocalciferol, (DRISDOL) 88452 units Cap capsule Take 1 Cap by mouth every 7 days. 12 Cap 3   • Probiotic Product (PROBIOTIC DAILY PO) Take  by mouth.       No current " facility-administered medications for this visit.          Physical Exam:   Gen:           Alert and oriented, No apparent distress. Mood and affect appropriate, normal interaction with examiner.  Eyes:          PERRL, EOM intact, sclere white, conjunctive moist.  Ears:          Not examined.   Hearing:     Grossly intact.  Nose:          Normal, no lesions or deformities.  Dentition:    Good dentition.  Oropharynx:   Tongue normal.  Mallampati Classification: not examined.  Neck:        Supple, trachea midline, no masses.  Respiratory Effort: No intercostal retractions or use of accessory muscles.   Lung Auscultation:      Clear to auscultation bilaterally; no rales, rhonchi or wheezing.  CV:            Regular rate and rhythm. No murmurs, rubs or gallops.  Abd:           Not examined.   Lymphadenopathy: Not examined.  Gait and Station: Normal.  Digits and Nails: No clubbing, cyanosis, petechiae, or nodes.   Cranial Nerves: II-XII grossly intact.  Skin:        No rashes, lesions or ulcers noted.               Ext:           No cyanosis or edema.      Assessment:  1. ROMELIA (obstructive sleep apnea)     2. Former smoker     3. BMI 30.0-30.9,adult     4. Weight loss     5. Hashimoto's thyroiditis         Immunizations:    Flu:not due  Pneumovax 23:not due  Prevnar 13:not due    Plan:  1.  Sleep apnea is well controlled but patient is having some issues due to her weight loss.  Mask fit in office.  DME mask/supplies.  DME order; pressures adjusted to 10 cm nightly.  DME order for travel CPAP.  2.  Discussed sleep hygiene.  3.  Encouraged further weight loss.  4.  Follow-up in 6 months to check symptoms/compliance report, sooner if needed.  Patient is at weight goal, recommend updating diagnostic study to restage her sleep apnea.    Please note that this dictation was created using voice recognition software. I have made every reasonable attempt to correct obvious errors, but it is possible there are errors of grammar and  possibly content that I did not discover before finalizing the note.

## 2019-09-25 ENCOUNTER — TELEPHONE (OUTPATIENT)
Dept: PULMONOLOGY | Facility: HOSPICE | Age: 64
End: 2019-09-25

## 2019-09-25 NOTE — TELEPHONE ENCOUNTER
Pt needs new DME  [facility] is not contracted with [facility]  Will need to switch to [facility] or [facility]

## 2020-01-24 ENCOUNTER — OFFICE VISIT (OUTPATIENT)
Dept: PULMONOLOGY | Facility: HOSPICE | Age: 65
End: 2020-01-24
Payer: COMMERCIAL

## 2020-01-24 VITALS
BODY MASS INDEX: 25.76 KG/M2 | SYSTOLIC BLOOD PRESSURE: 138 MMHG | HEART RATE: 86 BPM | HEIGHT: 62 IN | WEIGHT: 140 LBS | DIASTOLIC BLOOD PRESSURE: 80 MMHG | RESPIRATION RATE: 16 BRPM | OXYGEN SATURATION: 94 %

## 2020-01-24 DIAGNOSIS — G47.33 OSA (OBSTRUCTIVE SLEEP APNEA): Chronic | ICD-10-CM

## 2020-01-24 DIAGNOSIS — R63.4 WEIGHT LOSS: ICD-10-CM

## 2020-01-24 DIAGNOSIS — Z87.891 FORMER SMOKER: ICD-10-CM

## 2020-01-24 DIAGNOSIS — E06.3 HASHIMOTO'S THYROIDITIS: ICD-10-CM

## 2020-01-24 PROCEDURE — 99214 OFFICE O/P EST MOD 30 MIN: CPT | Performed by: NURSE PRACTITIONER

## 2020-01-24 NOTE — PROGRESS NOTES
Chief Complaint   Patient presents with   • Apnea     Last Seen 8/12/19       HPI:  Lashae Hoff is a 64 y.o. year old female here today for follow-up on ROMELIA.  Last office visit 8/12/2019.    Since last office visit she has lost 27 pounds.  She has lost 56 pounds since 2018 office visit. BMi 25. She retired from her job last year.  Her pressures were empirically decreased to CPAP 10 cm nightly due to some intolerance last time.  She is also ordered a travel CPAP.  Compliance card today notes only 6 days of use in the last month with an overall AHI of 0.1.  She is not tolerating pressure due to her weight loss.  She feels amazing after changing her diet and walking regularly.  She falls asleep quickly and sleeps through the night very well. Due to her weight loss advised updated sleep testing to reevaluate.  She denies cardiac or respiratory symptoms.  HX:  PSG 7/22/16 indicated AHI 28.7 and a minimum saturation of 79%.  Patient  was started on CPAP 9cm H2O nightly. Updated OPO 9/11/18 indicated low O2 levels, so pressures empirically increased to 11cm.  Repeat OPO 11/15/18 indicated only 5.4min <89% and basal spo2 92.3%.  Compliance card 7/9/2019 through 8/7/2019 indicates 100% compliance, average nightly use of 7 hours 43 minutes, minimal mask leaking with an overall AHI of 0.3.        ROS: As per HPI and otherwise negative if not stated.    Past Medical History:   Diagnosis Date   • Arthritis of knee, right     ?   • Bruxism    • Chickenpox    • Dyslipidemia, goal LDL below 130    • Elevated fasting glucose    • GERD (gastroesophageal reflux disease)    • History of asthma    • History of hyperthyroidism     hachimoto's thyroiditis   • Snoring        Past Surgical History:   Procedure Laterality Date   • BRAD BY LAPAROSCOPY  2013   • TONSILLECTOMY         Family History   Problem Relation Age of Onset   • Hypertension Mother    • Diabetes Mother         typr II   • Hyperlipidemia Mother    • Cancer  "Father         throat   • Arthritis Father         gout   • Diabetes Brother        Social History     Socioeconomic History   • Marital status:      Spouse name: Not on file   • Number of children: Not on file   • Years of education: Not on file   • Highest education level: Not on file   Occupational History   • Not on file   Social Needs   • Financial resource strain: Not on file   • Food insecurity:     Worry: Not on file     Inability: Not on file   • Transportation needs:     Medical: Not on file     Non-medical: Not on file   Tobacco Use   • Smoking status: Former Smoker     Packs/day: 1.00     Years: 2.00     Pack years: 2.00     Types: Cigarettes     Last attempt to quit: 1986     Years since quittin.5   • Smokeless tobacco: Never Used   Substance and Sexual Activity   • Alcohol use: Yes     Alcohol/week: 3.0 oz     Types: 5 Glasses of wine per week     Comment: occ   • Drug use: No   • Sexual activity: Not Currently   Lifestyle   • Physical activity:     Days per week: Not on file     Minutes per session: Not on file   • Stress: Not on file   Relationships   • Social connections:     Talks on phone: Not on file     Gets together: Not on file     Attends Ten[employer] service: Not on file     Active member of club or organization: Not on file     Attends meetings of clubs or organizations: Not on file     Relationship status: Not on file   • Intimate partner violence:     Fear of current or ex partner: Not on file     Emotionally abused: Not on file     Physically abused: Not on file     Forced sexual activity: Not on file   Other Topics Concern   • Not on file   Social History Narrative   • Not on file       Allergies as of 2020   • (No Known Allergies)        Vitals:  /80 (BP Location: Left arm, Patient Position: Sitting, BP Cuff Size: Adult)   Pulse 86   Resp 16   Ht 1.575 m (5' 2\")   Wt 63.5 kg (140 lb)   SpO2 94%     Current medications as of today   Current Outpatient " Medications   Medication Sig Dispense Refill   • Probiotic Product (PROBIOTIC DAILY PO) Take  by mouth.       No current facility-administered medications for this visit.          Physical Exam:   Gen:           Alert and oriented, No apparent distress. Mood and affect appropriate, normal interaction with examiner.  Eyes:          PERRL, EOM intact, sclere white, conjunctive moist.  Ears:          Not examined.   Hearing:     Grossly intact.  Nose:          Normal, no lesions or deformities.  Dentition:    Good dentition.  Oropharynx:   Tongue normal, posterior pharynx without erythema or exudate.  Mallampati Classification: 2/3, large tongue/scalloped  Neck:        Supple, trachea midline, no masses.  Respiratory Effort: No intercostal retractions or use of accessory muscles.   Lung Auscultation:      Clear to auscultation bilaterally; no rales, rhonchi or wheezing.  CV:            Regular rate and rhythm. No murmurs, rubs or gallops.  Abd:           Not examined.   Lymphadenopathy: Not examined.  Gait and Station: Normal.  Digits and Nails: No clubbing, cyanosis, petechiae, or nodes.   Cranial Nerves: II-XII grossly intact.  Skin:        No rashes, lesions or ulcers noted.               Ext:           No cyanosis or edema.      Assessment:  1. ROMELIA (obstructive sleep apnea)     2. Weight loss     3. BMI 25.0-25.9,adult     4. Hashimoto's thyroiditis     5. Former smoker         Immunizations:    Flu:recommend  Pneumovax 23:not due  Prevnar 13:not due    Plan:  1.  Patient does not have symptoms characteristic of sleep apnea.  She has lost a significant amount of weight.  She is no longer using her CPAP.  We will update a diagnostic study to verify that it is resolved.  I will call her results or send via [facility].  Pending results will determine follow-up.  2.  Follow-up with primary care for other health concerns.  3.  We will call results.    Please note that this dictation was created using voice recognition  software. I have made every reasonable attempt to correct obvious errors, but it is possible there are errors of grammar and possibly content that I did not discover before finalizing the note.

## 2020-01-24 NOTE — LETTER
ARACELIS Berry  [facility]   [address] [place] [address] [place] [address]  Phone: [phone] - Fax: [phone]           Encounter Date: 1/24/2020  Provider: ARACELIS Berry  Location of Care: [facility]      Patient:   Lashae Hoff   MR Number: [id number]   YOB: 1955     PROGRESS NOTE:  Chief Complaint   Patient presents with   • Apnea     Last Seen 8/12/19       HPI:  Lashae Hoff is a 64 y.o. year old female here today for follow-up on ROMELIA.  Last office visit 8/12/2019.    Since last office visit she has lost 27 pounds.  She has lost 56 pounds since 2018 office visit. BMi 25. She retired from her job last year.  Her pressures were empirically decreased to CPAP 10 cm nightly due to some intolerance last time.  She is also ordered a travel CPAP.  Compliance card today notes only 6 days of use in the last month with an overall AHI of 0.1.  She is not tolerating pressure due to her weight loss.  She feels amazing after changing her diet and walking regularly.  She falls asleep quickly and sleeps through the night very well. Due to her weight loss advised updated sleep testing to reevaluate.  She denies cardiac or respiratory symptoms.  HX:  PSG 7/22/16 indicated AHI 28.7 and a minimum saturation of 79%.  Patient  was started on CPAP 9cm H2O nightly. Updated OPO 9/11/18 indicated low O2 levels, so pressures empirically increased to 11cm.  Repeat OPO 11/15/18 indicated only 5.4min <89% and basal spo2 92.3%.  Compliance card 7/9/2019 through 8/7/2019 indicates 100% compliance, average nightly use of 7 hours 43 minutes, minimal mask leaking with an overall AHI of 0.3.        ROS: As per HPI and otherwise negative if not stated.    Past Medical History:   Diagnosis Date   • Arthritis of knee, right     ?   • Bruxism    • Chickenpox    • Dyslipidemia, goal LDL below 130    • Elevated fasting  glucose    • GERD (gastroesophageal reflux disease)    • History of asthma    • History of hyperthyroidism     hachimoto's thyroiditis   • Snoring        Past Surgical History:   Procedure Laterality Date   • BRAD BY LAPAROSCOPY     • TONSILLECTOMY         Family History   Problem Relation Age of Onset   • Hypertension Mother    • Diabetes Mother         typr II   • Hyperlipidemia Mother    • Cancer Father         throat   • Arthritis Father         gout   • Diabetes Brother        Social History     Socioeconomic History   • Marital status:      Spouse name: Not on file   • Number of children: Not on file   • Years of education: Not on file   • Highest education level: Not on file   Occupational History   • Not on file   Social Needs   • Financial resource strain: Not on file   • Food insecurity:     Worry: Not on file     Inability: Not on file   • Transportation needs:     Medical: Not on file     Non-medical: Not on file   Tobacco Use   • Smoking status: Former Smoker     Packs/day: 1.00     Years: 2.00     Pack years: 2.00     Types: Cigarettes     Last attempt to quit: 1986     Years since quittin.5   • Smokeless tobacco: Never Used   Substance and Sexual Activity   • Alcohol use: Yes     Alcohol/week: 3.0 oz     Types: 5 Glasses of wine per week     Comment: occ   • Drug use: No   • Sexual activity: Not Currently   Lifestyle   • Physical activity:     Days per week: Not on file     Minutes per session: Not on file   • Stress: Not on file   Relationships   • Social connections:     Talks on phone: Not on file     Gets together: Not on file     Attends Christian service: Not on file     Active member of club or organization: Not on file     Attends meetings of clubs or organizations: Not on file     Relationship status: Not on file   • Intimate partner violence:     Fear of current or ex partner: Not on file     Emotionally abused: Not on file     Physically abused: Not on file     Forced  "sexual activity: Not on file   Other Topics Concern   • Not on file   Social History Narrative   • Not on file       Allergies as of 01/24/2020   • (No Known Allergies)        Vitals:  /80 (BP Location: Left arm, Patient Position: Sitting, BP Cuff Size: Adult)   Pulse 86   Resp 16   Ht 1.575 m (5' 2\")   Wt 63.5 kg (140 lb)   SpO2 94%     Current medications as of today   Current Outpatient Medications   Medication Sig Dispense Refill   • Probiotic Product (PROBIOTIC DAILY PO) Take  by mouth.       No current facility-administered medications for this visit.          Physical Exam:   Gen:           Alert and oriented, No apparent distress. Mood and affect appropriate, normal interaction with examiner.  Eyes:          PERRL, EOM intact, sclere white, conjunctive moist.  Ears:          Not examined.   Hearing:     Grossly intact.  Nose:          Normal, no lesions or deformities.  Dentition:    Good dentition.  Oropharynx:   Tongue normal, posterior pharynx without erythema or exudate.  Mallampati Classification: 2/3, large tongue/scalloped  Neck:        Supple, trachea midline, no masses.  Respiratory Effort: No intercostal retractions or use of accessory muscles.   Lung Auscultation:      Clear to auscultation bilaterally; no rales, rhonchi or wheezing.  CV:            Regular rate and rhythm. No murmurs, rubs or gallops.  Abd:           Not examined.   Lymphadenopathy: Not examined.  Gait and Station: Normal.  Digits and Nails: No clubbing, cyanosis, petechiae, or nodes.   Cranial Nerves: II-XII grossly intact.  Skin:        No rashes, lesions or ulcers noted.               Ext:           No cyanosis or edema.      Assessment:  1. ROMELIA (obstructive sleep apnea)     2. Weight loss     3. BMI 25.0-25.9,adult     4. Hashimoto's thyroiditis     5. Former smoker         Immunizations:    Flu:recommend  Pneumovax 23:not due  Prevnar 13:not due    Plan:  1.  Patient does not have symptoms characteristic of sleep " apnea.  She has lost a significant amount of weight.  She is no longer using her CPAP.  We will update a diagnostic study to verify that it is resolved.  I will call her results or send via [employer].  Pending results will determine follow-up.  2.  Follow-up with primary care for other health concerns.  3.  We will call results.    Please note that this dictation was created using voice recognition software. I have made every reasonable attempt to correct obvious errors, but it is possible there are errors of grammar and possibly content that I did not discover before finalizing the note.      Electronically signed by ARACELIS Berry  on 01/24/20    Lizeth Crowell M.D.  [address] [place] [address] [id number]  [id number]  [place] [id number]  VIA Facsimile: [phone]

## 2021-03-03 DIAGNOSIS — Z23 NEED FOR VACCINATION: ICD-10-CM

## 2021-07-16 SDOH — HEALTH STABILITY: PHYSICAL HEALTH: ON AVERAGE, HOW MANY MINUTES DO YOU ENGAGE IN EXERCISE AT THIS LEVEL?: 60 MIN

## 2021-07-16 SDOH — ECONOMIC STABILITY: FOOD INSECURITY: WITHIN THE PAST 12 MONTHS, YOU WORRIED THAT YOUR FOOD WOULD RUN OUT BEFORE YOU GOT MONEY TO BUY MORE.: NEVER TRUE

## 2021-07-16 SDOH — HEALTH STABILITY: PHYSICAL HEALTH: ON AVERAGE, HOW MANY DAYS PER WEEK DO YOU ENGAGE IN MODERATE TO STRENUOUS EXERCISE (LIKE A BRISK WALK)?: 7 DAYS

## 2021-07-16 SDOH — ECONOMIC STABILITY: HOUSING INSECURITY
IN THE LAST 12 MONTHS, WAS THERE A TIME WHEN YOU DID NOT HAVE A STEADY PLACE TO SLEEP OR SLEPT IN A SHELTER (INCLUDING NOW)?: NO

## 2021-07-16 SDOH — ECONOMIC STABILITY: HOUSING INSECURITY: IN THE LAST 12 MONTHS, HOW MANY PLACES HAVE YOU LIVED?: 2

## 2021-07-16 SDOH — ECONOMIC STABILITY: INCOME INSECURITY: IN THE LAST 12 MONTHS, WAS THERE A TIME WHEN YOU WERE NOT ABLE TO PAY THE MORTGAGE OR RENT ON TIME?: NO

## 2021-07-16 SDOH — ECONOMIC STABILITY: TRANSPORTATION INSECURITY
IN THE PAST 12 MONTHS, HAS LACK OF TRANSPORTATION KEPT YOU FROM MEETINGS, WORK, OR FROM GETTING THINGS NEEDED FOR DAILY LIVING?: NO

## 2021-07-16 SDOH — ECONOMIC STABILITY: FOOD INSECURITY: WITHIN THE PAST 12 MONTHS, THE FOOD YOU BOUGHT JUST DIDN'T LAST AND YOU DIDN'T HAVE MONEY TO GET MORE.: NEVER TRUE

## 2021-07-16 SDOH — ECONOMIC STABILITY: INCOME INSECURITY: HOW HARD IS IT FOR YOU TO PAY FOR THE VERY BASICS LIKE FOOD, HOUSING, MEDICAL CARE, AND HEATING?: NOT VERY HARD

## 2021-07-16 SDOH — ECONOMIC STABILITY: TRANSPORTATION INSECURITY
IN THE PAST 12 MONTHS, HAS THE LACK OF TRANSPORTATION KEPT YOU FROM MEDICAL APPOINTMENTS OR FROM GETTING MEDICATIONS?: NO

## 2021-07-16 SDOH — HEALTH STABILITY: MENTAL HEALTH
STRESS IS WHEN SOMEONE FEELS TENSE, NERVOUS, ANXIOUS, OR CAN'T SLEEP AT NIGHT BECAUSE THEIR MIND IS TROUBLED. HOW STRESSED ARE YOU?: ONLY A LITTLE

## 2021-07-16 SDOH — ECONOMIC STABILITY: TRANSPORTATION INSECURITY
IN THE PAST 12 MONTHS, HAS LACK OF RELIABLE TRANSPORTATION KEPT YOU FROM MEDICAL APPOINTMENTS, MEETINGS, WORK OR FROM GETTING THINGS NEEDED FOR DAILY LIVING?: NO

## 2021-07-16 ASSESSMENT — SOCIAL DETERMINANTS OF HEALTH (SDOH)
HOW OFTEN DO YOU GET TOGETHER WITH FRIENDS OR RELATIVES?: ONCE A WEEK
HOW OFTEN DO YOU HAVE SIX OR MORE DRINKS ON ONE OCCASION: NEVER
HOW MANY DRINKS CONTAINING ALCOHOL DO YOU HAVE ON A TYPICAL DAY WHEN YOU ARE DRINKING: 1 OR 2
IN A TYPICAL WEEK, HOW MANY TIMES DO YOU TALK ON THE PHONE WITH FAMILY, FRIENDS, OR NEIGHBORS?: ONCE A WEEK
HOW OFTEN DO YOU HAVE A DRINK CONTAINING ALCOHOL: 2-4 TIMES A MONTH
IN A TYPICAL WEEK, HOW MANY TIMES DO YOU TALK ON THE PHONE WITH FAMILY, FRIENDS, OR NEIGHBORS?: ONCE A WEEK
HOW OFTEN DO YOU GET TOGETHER WITH FRIENDS OR RELATIVES?: ONCE A WEEK
HOW HARD IS IT FOR YOU TO PAY FOR THE VERY BASICS LIKE FOOD, HOUSING, MEDICAL CARE, AND HEATING?: NOT VERY HARD
WITHIN THE PAST 12 MONTHS, YOU WORRIED THAT YOUR FOOD WOULD RUN OUT BEFORE YOU GOT THE MONEY TO BUY MORE: NEVER TRUE

## 2021-07-16 ASSESSMENT — LIFESTYLE VARIABLES
HOW OFTEN DO YOU HAVE A DRINK CONTAINING ALCOHOL: 2-4 TIMES A MONTH
HOW OFTEN DO YOU HAVE SIX OR MORE DRINKS ON ONE OCCASION: NEVER
HOW MANY STANDARD DRINKS CONTAINING ALCOHOL DO YOU HAVE ON A TYPICAL DAY: 1 OR 2

## 2021-07-19 ENCOUNTER — OFFICE VISIT (OUTPATIENT)
Dept: MEDICAL GROUP | Facility: LAB | Age: 66
End: 2021-07-19
Payer: MEDICARE

## 2021-07-19 VITALS
HEIGHT: 62 IN | TEMPERATURE: 98.9 F | HEART RATE: 90 BPM | SYSTOLIC BLOOD PRESSURE: 104 MMHG | DIASTOLIC BLOOD PRESSURE: 70 MMHG | OXYGEN SATURATION: 96 % | BODY MASS INDEX: 30.91 KG/M2 | RESPIRATION RATE: 16 BRPM | WEIGHT: 168 LBS

## 2021-07-19 DIAGNOSIS — Z12.31 ENCOUNTER FOR SCREENING MAMMOGRAM FOR BREAST CANCER: ICD-10-CM

## 2021-07-19 DIAGNOSIS — G47.33 OSA (OBSTRUCTIVE SLEEP APNEA): Chronic | ICD-10-CM

## 2021-07-19 DIAGNOSIS — E04.1 THYROID NODULE: ICD-10-CM

## 2021-07-19 DIAGNOSIS — Z13.21 ENCOUNTER FOR VITAMIN DEFICIENCY SCREENING: ICD-10-CM

## 2021-07-19 DIAGNOSIS — Z13.0 SCREENING FOR DEFICIENCY ANEMIA: ICD-10-CM

## 2021-07-19 DIAGNOSIS — E06.3 HASHIMOTO'S THYROIDITIS: ICD-10-CM

## 2021-07-19 DIAGNOSIS — Z13.1 SCREENING FOR DIABETES MELLITUS: ICD-10-CM

## 2021-07-19 DIAGNOSIS — Z11.59 NEED FOR HEPATITIS C SCREENING TEST: ICD-10-CM

## 2021-07-19 DIAGNOSIS — Z78.0 POSTMENOPAUSAL: ICD-10-CM

## 2021-07-19 DIAGNOSIS — E78.5 DYSLIPIDEMIA, GOAL LDL BELOW 130: ICD-10-CM

## 2021-07-19 PROBLEM — E04.9 ENLARGED THYROID GLAND: Status: RESOLVED | Noted: 2018-01-12 | Resolved: 2021-07-19

## 2021-07-19 PROCEDURE — 99204 OFFICE O/P NEW MOD 45 MIN: CPT | Performed by: FAMILY MEDICINE

## 2021-07-19 ASSESSMENT — PATIENT HEALTH QUESTIONNAIRE - PHQ9: CLINICAL INTERPRETATION OF PHQ2 SCORE: 0

## 2021-07-19 NOTE — ASSESSMENT & PLAN NOTE
This is a new problem to me.   In her early 20's she was hyperthyroid and she was on medication. It sounds like she was treated with radioactive iodine and has been euthyroid since immediately after the treatment. She is not currently on any thyroid medication. She denies any palpitations or insomnia.

## 2021-07-19 NOTE — PATIENT INSTRUCTIONS
Mediterranean Diet  A Mediterranean diet refers to food and lifestyle choices that are based on the traditions of countries located on the [place]. This way of eating has been shown to help prevent certain conditions and improve outcomes for people who have chronic diseases, like kidney disease and heart disease.  What are tips for following this plan?  Lifestyle  · Cook and eat meals together with your family, when possible.  · Drink enough fluid to keep your urine clear or pale yellow.  · Be physically active every day. This includes:  ? Aerobic exercise like running or swimming.  ? Leisure activities like gardening, walking, or housework.  · Get 7-8 hours of sleep each night.  · If recommended by your health care provider, drink red wine in moderation. This means 1 glass a day for nonpregnant women and 2 glasses a day for men. A glass of wine equals 5 oz (150 mL).  Reading food labels    · Check the serving size of packaged foods. For foods such as rice and pasta, the serving size refers to the amount of cooked product, not dry.  · Check the total fat in packaged foods. Avoid foods that have saturated fat or trans fats.  · Check the ingredients list for added sugars, such as corn syrup.  Shopping  · At the grocery store, buy most of your food from the areas near the walls of the store. This includes:  ? Fresh fruits and vegetables (produce).  ? Grains, beans, nuts, and seeds. Some of these may be available in unpackaged forms or large amounts (in bulk).  ? Fresh seafood.  ? Poultry and eggs.  ? Low-fat dairy products.  · Buy whole ingredients instead of prepackaged foods.  · Buy fresh fruits and vegetables in-season from local farmers markets.  · Buy frozen fruits and vegetables in resealable bags.  · If you do not have access to quality fresh seafood, buy precooked frozen shrimp or canned fish, such as tuna, salmon, or sardines.  · Buy small amounts of raw or cooked vegetables, salads, or olives from  the deli or salad bar at your store.  · Stock your pantry so you always have certain foods on hand, such as olive oil, canned tuna, canned tomatoes, rice, pasta, and beans.  Cooking  · Cook foods with extra-virgin olive oil instead of using butter or other vegetable oils.  · Have meat as a side dish, and have vegetables or grains as your main dish. This means having meat in small portions or adding small amounts of meat to foods like pasta or stew.  · Use beans or vegetables instead of meat in common dishes like chili or lasagna.  · [place] with different cooking methods. Try roasting or broiling vegetables instead of steaming or sautéeing them.  · Add frozen vegetables to soups, stews, pasta, or rice.  · Add nuts or seeds for added healthy fat at each meal. You can add these to yogurt, salads, or vegetable dishes.  · Marinate fish or vegetables using olive oil, lemon juice, garlic, and fresh herbs.  Meal planning    · Plan to eat 1 vegetarian meal one day each week. Try to work up to 2 vegetarian meals, if possible.  · Eat seafood 2 or more times a week.  · Have healthy snacks readily available, such as:  ? Vegetable sticks with hummus.  ? Greek yogurt.  ? Fruit and nut trail mix.  · Eat balanced meals throughout the week. This includes:  ? Fruit: 2-3 servings a day  ? Vegetables: 4-5 servings a day  ? Low-fat dairy: 2 servings a day  ? Fish, poultry, or lean meat: 1 serving a day  ? Beans and legumes: 2 or more servings a week  ? Nuts and seeds: 1-2 servings a day  ? Whole grains: 6-8 servings a day  ? Extra-virgin olive oil: 3-4 servings a day  · Limit red meat and sweets to only a few servings a month  What are my food choices?  · Mediterranean diet  ? Recommended  § Grains: Whole-grain pasta. Brown rice. Bulgar wheat. Polenta. Couscous. Whole-wheat bread. Oatmeal. Quinoa.  § Vegetables: Artichokes. Beets. Broccoli. Cabbage. Carrots. Eggplant. Green beans. Chard. Kale. Spinach. Onions. Leeks. Peas. Squash.  Tomatoes. Peppers. Radishes.  § Fruits: Apples. Apricots. Avocado. Berries. Bananas. Cherries. Dates. Figs. Grapes. Ron. Melon. Oranges. Peaches. Plums. Pomegranate.  § Meats and other protein foods: Beans. Almonds. Sunflower seeds. Pine nuts. Peanuts. Cod. [place]. Scallops. Shrimp. Tuna. Tilapia. Clams. Oysters. Eggs.  § Dairy: Low-fat milk. Cheese. Greek yogurt.  § Beverages: Water. Red wine. Herbal tea.  § Fats and oils: Extra virgin olive oil. Avocado oil. Grape seed oil.  § Sweets and desserts: Greek yogurt with honey. Baked apples. Poached pears. Trail mix.  § Seasoning and other foods: Basil. Cilantro. Coriander. Cumin. Mint. Parsley. [place]. Rosemary. Tarragon. Garlic. Oregano. Thyme. Pepper. Balsalmic vinegar. Tahini. Hummus. Tomato sauce. Olives. Mushrooms.  ? Limit these  § Grains: Prepackaged pasta or rice dishes. Prepackaged cereal with added sugar.  § Vegetables: Deep fried potatoes (french fries).  § Fruits: Fruit canned in syrup.  § Meats and other protein foods: Beef. Pork. Lamb. Poultry with skin. Hot dogs. [place].  § Dairy: Ice cream. Sour cream. Whole milk.  § Beverages: Juice. Sugar-sweetened soft drinks. Beer. Liquor and spirits.  § Fats and oils: Butter. Canola oil. Vegetable oil. Beef fat (tallow). Lard.  § Sweets and desserts: Cookies. Cakes. Pies. Candy.  § Seasoning and other foods: Mayonnaise. Premade sauces and marinades.  The items listed may not be a complete list. Talk with your dietitian about what dietary choices are right for you.  Summary  · The Mediterranean diet includes both food and lifestyle choices.  · Eat a variety of fresh fruits and vegetables, beans, nuts, seeds, and whole grains.  · Limit the amount of red meat and sweets that you eat.  · Talk with your health care provider about whether it is safe for you to drink red wine in moderation. This means 1 glass a day for nonpregnant women and 2 glasses a day for men. A glass of wine equals 5 oz (150 mL).  This information  is not intended to replace advice given to you by your health care provider. Make sure you discuss any questions you have with your health care provider.  Document Released: 08/10/2017 Document Revised: 08/17/2017 Document Reviewed: 08/10/2017  Elsevier Patient Education © 2020 [employer]

## 2021-07-19 NOTE — ASSESSMENT & PLAN NOTE
This is a new problem to me. Patient is no longer on CPAP.  She lost about 60 pounds but has gained about 30 of it back.  The overall AHI was 28.7 events per hour, comprised of obstructive respiratory events, consistent with moderately severe ROMELIA. Desaturations were noted below 90% for 96% of the night, to a jay of 79%.   She is working on losing the weight again.

## 2021-07-21 NOTE — ASSESSMENT & PLAN NOTE
This is a new problem to me that patient has been managing with diet and exercise. She is due for labs.

## 2021-07-21 NOTE — PROGRESS NOTES
NeedsSubjective:     Chief Complaint   Patient presents with   • [facility]       Lashae Hoff is a 66 y.o. female here today for evaluation and management of:    Hashimoto's thyroiditis  This is a new problem to me.   In her early 20's she was hyperthyroid and she was on medication. It sounds like she was treated with radioactive iodine and has been euthyroid since immediately after the treatment. She is not currently on any thyroid medication. She denies any palpitations or insomnia.    ROMELIA (obstructive sleep apnea)  This is a new problem to me. Patient is no longer on CPAP.  She lost about 60 pounds but has gained about 30 of it back.  The overall AHI was 28.7 events per hour, comprised of obstructive respiratory events, consistent with moderately severe ROMELIA. Desaturations were noted below 90% for 96% of the night, to a jay of 79%.   She is working on losing the weight again.    Thyroid nodule  Patient reports a history of a thyroid nodule.  Her last ultrasound was in 2018 and showed the following:  The right lobe of the thyroid gland measures 3.04 cm x 4.16 cm x 2.89 cm.  The left lobe of the thyroid gland measures 1.81 cm x 5.34 cm x 1.77 cm.  The isthmus measures 0.78 cm.  A 2.7 x 2.7 x 2.7 cm cystic and solid nodule is located within the mid right lobe of the thyroid.  A 1.4 x 1.3 x 0.5 cm hypoechoic cystic and solid nodules located mid left lobe of the thyroid.  A fine-needle aspiration was done on the right side which showed the following:  A. Right thyroid fine needle aspiration:          [place] thyroid category 2: benign.          The specimen is of mild to moderate cellularity with many sheets of benign follicular cells. Abundant histiocytes are present,suggesting a cystic component.   Patient had been following up with the endocrinologist but has not in several years.  She is due for labs.    Dyslipidemia, goal LDL below 130  This is a new problem to me that patient has been managing  "with diet and exercise. She is due for labs.       Allergies   Allergen Reactions   • Macrobid [Nitrofurantoin]      Other reaction(s): flu like symptoms       Current medicines (including changes today)  No current outpatient medications on file.     No current facility-administered medications for this visit.       She  has a past medical history of Allergy, Arthritis of knee, right, Asthma, Blood transfusion without reported diagnosis, Bruxism, Chickenpox, Dyslipidemia, goal LDL below 130, Elevated fasting glucose, GERD (gastroesophageal reflux disease), Head ache, History of asthma, History of hyperthyroidism, Snoring, and Thyroid disease.    Patient Active Problem List    Diagnosis Date Noted   • Thyroid nodule 07/19/2021   • Alkaline phosphatase elevation 01/12/2018   • Arthritis 01/12/2018   • RBBB 01/12/2018   • ROMELIA (obstructive sleep apnea) 07/28/2016   • Hashimoto's thyroiditis 07/15/2016   • Dyslipidemia, goal LDL below 130        ROS   No fever or chills.  No nausea or vomiting.  No chest pain or palpitations.  No cough or SOB.  No pain with urination or hematuria.  No black or bloody stools.       Objective:     /70 (BP Location: Right arm, Patient Position: Sitting, BP Cuff Size: Adult)   Pulse 90   Temp 37.2 °C (98.9 °F) (Temporal)   Resp 16   Ht 1.575 m (5' 2\")   Wt 76.2 kg (168 lb)   SpO2 96%  Body mass index is 30.73 kg/m².   Physical Exam:  Well developed, well nourished.  Alert, oriented in no acute distress.  Eye contact is good, speech goal directed, affect calm  Eyes: conjunctiva non-injected, sclera non-icteric.  Neck Supple.  No adenopathy or masses in the neck or supraclavicular regions. Diffuse thyromegaly  Lungs: clear to auscultation bilaterally with good excursion. No wheezes or rhonchi  CV: regular rate and rhythm. No murmur            Assessment and Plan:   The following treatment plan was discussed    1. Hashimoto's thyroiditis  This is a new problem to me.   In her early " 20's she was hyperthyroid and she was on medication. It sounds like she was treated with radioactive iodine and has been euthyroid since immediately after the treatment. She is not currently on any thyroid medication. She denies any palpitations or insomnia. We will check labs to assess and determine if she has remained euthyroid. Ultrasound of the thyroid to assess nodularity.  - TSH; Future  - FREE THYROXINE; Future  - TRIIDOTHYRONINE; Future  - ANTITHYROGLOBULIN AB; Future  - THYROID PEROXIDASE  (TPO) AB; Future  - US-THYROID; Future    2. ROMELIA (obstructive sleep apnea)  This is a new problem to me. Patient is no longer on CPAP.  She lost about 60 pounds but has gained about 30 of it back.  The overall AHI was 28.7 events per hour, comprised of obstructive respiratory events, consistent with moderately severe ROMELIA. Desaturations were noted below 90% for 96% of the night, to a jay of 79%.   She is working on losing the weight again.  Encouraged continued weight loss. Discussed complications of untreated obstructive sleep apnea.    3. Dyslipidemia, goal LDL below 130  This is a new problem to me. We will recheck lipid profile to determine trend. Consider statin if trending upward.  The 10-year ASCVD risk score (Leah DC Jr., et al., 2013) is: 3.9%  Mediterranean eating plan recommended.  - Lipid Profile; Future    4. Encounter for screening mammogram for breast cancer    - MA-SCREENING MAMMO BILAT W/CAD; Future    5. Postmenopausal  Bone density to assess. Continue weightbearing exercise, calcium and vitamin D supplementation  - DS-BONE DENSITY STUDY (DEXA); Future    6. Screening for deficiency anemia  Screening labs ordered.  Await results for counseling.    - CBC WITH DIFFERENTIAL; Future    7. Screening for diabetes mellitus  Screening labs ordered.  Await results for counseling.    - Comp Metabolic Panel; Future    8. Encounter for vitamin deficiency screening  Screening labs ordered.  Await results for  counseling.    - VITAMIN D,25 HYDROXY; Future    9. Need for hepatitis C screening test  Screening labs ordered.  Await results for counseling.    - HCV Scrn ( [id number] 1xLife); Future    10. Thyroid nodule  This is a new problem to me that further follow-up. Patient has not been seen endocrinology for quite some time. Patient reports a history of a thyroid nodule.  Her last ultrasound was in 2018 and showed the following:  The right lobe of the thyroid gland measures 3.04 cm x 4.16 cm x 2.89 cm.  The left lobe of the thyroid gland measures 1.81 cm x 5.34 cm x 1.77 cm.  The isthmus measures 0.78 cm.  A 2.7 x 2.7 x 2.7 cm cystic and solid nodule is located within the mid right lobe of the thyroid.  A 1.4 x 1.3 x 0.5 cm hypoechoic cystic and solid nodules located mid left lobe of the thyroid.  A fine-needle aspiration was done on the right side which showed the following:  A. Right thyroid fine needle aspiration:          [place] thyroid category 2: benign.          The specimen is of mild to moderate cellularity with many sheets of benign follicular cells. Abundant histiocytes are present,suggesting a cystic component.   Patient had been following up with the endocrinologist but has not in several years.  She is due for labs. Schedule ultrasound of the thyroid to assess stability.  - US-THYROID; Future    Any change or worsening of signs or symptoms, patient encouraged to follow-up or report to the emergency room for further evaluation. Patient understands and agrees.    Followup: Return in about 1 year (around 2022).

## 2021-07-21 NOTE — ASSESSMENT & PLAN NOTE
Patient reports a history of a thyroid nodule.  Her last ultrasound was in 2018 and showed the following:  The right lobe of the thyroid gland measures 3.04 cm x 4.16 cm x 2.89 cm.  The left lobe of the thyroid gland measures 1.81 cm x 5.34 cm x 1.77 cm.  The isthmus measures 0.78 cm.  A 2.7 x 2.7 x 2.7 cm cystic and solid nodule is located within the mid right lobe of the thyroid.  A 1.4 x 1.3 x 0.5 cm hypoechoic cystic and solid nodules located mid left lobe of the thyroid.  A fine-needle aspiration was done on the right side which showed the following:  A. Right thyroid fine needle aspiration:          [place] thyroid category 2: benign.          The specimen is of mild to moderate cellularity with many sheets of benign follicular cells. Abundant histiocytes are present,suggesting a cystic component.   Patient had been following up with the endocrinologist but has not in several years.  She is due for labs.

## 2021-07-22 ENCOUNTER — HOSPITAL ENCOUNTER (OUTPATIENT)
Dept: LAB | Facility: MEDICAL CENTER | Age: 66
End: 2021-07-22
Attending: FAMILY MEDICINE
Payer: MEDICARE

## 2021-07-22 DIAGNOSIS — Z13.0 SCREENING FOR DEFICIENCY ANEMIA: ICD-10-CM

## 2021-07-22 DIAGNOSIS — Z13.21 ENCOUNTER FOR VITAMIN DEFICIENCY SCREENING: ICD-10-CM

## 2021-07-22 DIAGNOSIS — E06.3 HASHIMOTO'S THYROIDITIS: ICD-10-CM

## 2021-07-22 DIAGNOSIS — Z13.1 SCREENING FOR DIABETES MELLITUS: ICD-10-CM

## 2021-07-22 DIAGNOSIS — Z11.59 NEED FOR HEPATITIS C SCREENING TEST: ICD-10-CM

## 2021-07-22 DIAGNOSIS — E06.9 HYPERTHYROIDITIS: ICD-10-CM

## 2021-07-22 DIAGNOSIS — E78.5 DYSLIPIDEMIA, GOAL LDL BELOW 130: ICD-10-CM

## 2021-07-22 LAB
ALBUMIN SERPL BCP-MCNC: 4.4 G/DL (ref 3.2–4.9)
ALBUMIN/GLOB SERPL: 1.6 G/DL
ALP SERPL-CCNC: 154 U/L (ref 30–99)
ALT SERPL-CCNC: 20 U/L (ref 2–50)
ANION GAP SERPL CALC-SCNC: 11 MMOL/L (ref 7–16)
AST SERPL-CCNC: 16 U/L (ref 12–45)
BASOPHILS # BLD AUTO: 0.4 % (ref 0–1.8)
BASOPHILS # BLD: 0.03 K/UL (ref 0–0.12)
BILIRUB SERPL-MCNC: 0.4 MG/DL (ref 0.1–1.5)
BUN SERPL-MCNC: 10 MG/DL (ref 8–22)
CALCIUM SERPL-MCNC: 9.3 MG/DL (ref 8.4–10.2)
CHLORIDE SERPL-SCNC: 101 MMOL/L (ref 96–112)
CHOLEST SERPL-MCNC: 228 MG/DL (ref 100–199)
CO2 SERPL-SCNC: 25 MMOL/L (ref 20–33)
CREAT SERPL-MCNC: 0.47 MG/DL (ref 0.5–1.4)
EOSINOPHIL # BLD AUTO: 0.18 K/UL (ref 0–0.51)
EOSINOPHIL NFR BLD: 2.5 % (ref 0–6.9)
ERYTHROCYTE [DISTWIDTH] IN BLOOD BY AUTOMATED COUNT: 43.1 FL (ref 35.9–50)
GLOBULIN SER CALC-MCNC: 2.8 G/DL (ref 1.9–3.5)
GLUCOSE SERPL-MCNC: 93 MG/DL (ref 65–99)
HCT VFR BLD AUTO: 46 % (ref 37–47)
HDLC SERPL-MCNC: 72 MG/DL
HGB BLD-MCNC: 15.9 G/DL (ref 12–16)
IMM GRANULOCYTES # BLD AUTO: 0.03 K/UL (ref 0–0.11)
IMM GRANULOCYTES NFR BLD AUTO: 0.4 % (ref 0–0.9)
LDLC SERPL CALC-MCNC: 129 MG/DL
LYMPHOCYTES # BLD AUTO: 2.13 K/UL (ref 1–4.8)
LYMPHOCYTES NFR BLD: 29.7 % (ref 22–41)
MCH RBC QN AUTO: 32 PG (ref 27–33)
MCHC RBC AUTO-ENTMCNC: 34.6 G/DL (ref 33.6–35)
MCV RBC AUTO: 92.6 FL (ref 81.4–97.8)
MONOCYTES # BLD AUTO: 0.64 K/UL (ref 0–0.85)
MONOCYTES NFR BLD AUTO: 8.9 % (ref 0–13.4)
NEUTROPHILS # BLD AUTO: 4.17 K/UL (ref 2–7.15)
NEUTROPHILS NFR BLD: 58.1 % (ref 44–72)
NRBC # BLD AUTO: 0 K/UL
NRBC BLD-RTO: 0 /100 WBC
PLATELET # BLD AUTO: 205 K/UL (ref 164–446)
PMV BLD AUTO: 10.9 FL (ref 9–12.9)
POTASSIUM SERPL-SCNC: 4.1 MMOL/L (ref 3.6–5.5)
PROT SERPL-MCNC: 7.2 G/DL (ref 6–8.2)
RBC # BLD AUTO: 4.97 M/UL (ref 4.2–5.4)
SODIUM SERPL-SCNC: 137 MMOL/L (ref 135–145)
T3 SERPL-MCNC: 130 NG/DL (ref 60–181)
T4 FREE SERPL-MCNC: 1.73 NG/DL (ref 0.93–1.7)
TRIGL SERPL-MCNC: 133 MG/DL (ref 0–149)
TSH SERPL DL<=0.005 MIU/L-ACNC: 0.01 UIU/ML (ref 0.38–5.33)
WBC # BLD AUTO: 7.2 K/UL (ref 4.8–10.8)

## 2021-07-22 PROCEDURE — 86800 THYROGLOBULIN ANTIBODY: CPT

## 2021-07-22 PROCEDURE — 80053 COMPREHEN METABOLIC PANEL: CPT

## 2021-07-22 PROCEDURE — 85025 COMPLETE CBC W/AUTO DIFF WBC: CPT

## 2021-07-22 PROCEDURE — 84480 ASSAY TRIIODOTHYRONINE (T3): CPT

## 2021-07-22 PROCEDURE — 36415 COLL VENOUS BLD VENIPUNCTURE: CPT

## 2021-07-22 PROCEDURE — 84439 ASSAY OF FREE THYROXINE: CPT

## 2021-07-22 PROCEDURE — 86376 MICROSOMAL ANTIBODY EACH: CPT

## 2021-07-22 PROCEDURE — 80061 LIPID PANEL: CPT

## 2021-07-22 PROCEDURE — 84443 ASSAY THYROID STIM HORMONE: CPT

## 2021-07-24 LAB
THYROGLOB AB SERPL-ACNC: 264.9 IU/ML (ref 0–4)
THYROPEROXIDASE AB SERPL-ACNC: 163 IU/ML (ref 0–9)

## 2021-08-01 ENCOUNTER — HOSPITAL ENCOUNTER (OUTPATIENT)
Dept: RADIOLOGY | Facility: MEDICAL CENTER | Age: 66
End: 2021-08-01
Attending: FAMILY MEDICINE
Payer: MEDICARE

## 2021-08-01 DIAGNOSIS — E04.1 THYROID NODULE: ICD-10-CM

## 2021-08-01 DIAGNOSIS — E06.3 HASHIMOTO'S THYROIDITIS: ICD-10-CM

## 2021-08-01 PROCEDURE — 76536 US EXAM OF HEAD AND NECK: CPT

## 2021-08-26 ENCOUNTER — TELEPHONE (OUTPATIENT)
Dept: MEDICAL GROUP | Facility: LAB | Age: 66
End: 2021-08-26

## 2021-08-26 ENCOUNTER — HOSPITAL ENCOUNTER (OUTPATIENT)
Dept: RADIOLOGY | Facility: MEDICAL CENTER | Age: 66
End: 2021-08-26
Attending: FAMILY MEDICINE
Payer: MEDICARE

## 2021-08-26 DIAGNOSIS — Z78.0 POSTMENOPAUSAL: ICD-10-CM

## 2021-08-26 PROCEDURE — 77080 DXA BONE DENSITY AXIAL: CPT

## 2021-08-26 NOTE — TELEPHONE ENCOUNTER
----- Message from Riana Scott M.D. sent at 8/26/2021  4:08 PM PDT -----  Please have patient make an appointment to discuss her bone density results.  Riana Scott M.D.

## 2021-09-02 ENCOUNTER — OFFICE VISIT (OUTPATIENT)
Dept: MEDICAL GROUP | Facility: LAB | Age: 66
End: 2021-09-02
Payer: MEDICARE

## 2021-09-02 VITALS
SYSTOLIC BLOOD PRESSURE: 140 MMHG | DIASTOLIC BLOOD PRESSURE: 76 MMHG | RESPIRATION RATE: 12 BRPM | TEMPERATURE: 98.4 F | HEIGHT: 62 IN | OXYGEN SATURATION: 100 % | BODY MASS INDEX: 30.73 KG/M2 | WEIGHT: 167 LBS | HEART RATE: 96 BPM

## 2021-09-02 DIAGNOSIS — M85.80 OSTEOPENIA AFTER MENOPAUSE: ICD-10-CM

## 2021-09-02 DIAGNOSIS — Z78.0 OSTEOPENIA AFTER MENOPAUSE: ICD-10-CM

## 2021-09-02 PROCEDURE — 99213 OFFICE O/P EST LOW 20 MIN: CPT | Performed by: FAMILY MEDICINE

## 2021-09-02 ASSESSMENT — FIBROSIS 4 INDEX: FIB4 SCORE: 1.13

## 2021-09-02 NOTE — PROGRESS NOTES
"Subjective:     Chief Complaint   Patient presents with   • Follow-Up     scan results bone          HPI:   Lashae presents today to follow up bone density.     Does take D3 2000 units daily. Does get calcium in diet, cheese, yogurt, sour cream.   No osteoporosis in the fmaily. Nom is alive at 89 and living independently.     Was on HRT for a month after menopause, then stopped. Does see endocrinology. Has slightly overactive thyroid.  She does see endocrinology coming up in October, and in the past they have always simply monitor this without any medications.  She did recently have an elevated alkaline phosphatase on labs in July as well.    Exercise: Walking daily, 3 miles. Some Strength training for seniors through [employer]. This stopped.     No current [facility]-ordered outpatient medications on file.     No current [facility]-ordered facility-administered medications on file.           ROS:  Gen: no fevers/chills, no changes in weight  Eyes: no changes in vision  ENT: no sore throat, no hearing loss, no bloody nose  Pulm: no sob, no cough  CV: no chest pain, no palpitations  GI: no nausea/vomiting, no diarrhea  : no dysuria  MSk: no myalgias  Skin: no rash  Neuro: no headaches, no numbness/tingling  Heme/Lymph: no easy bruising      Objective:     Exam:  /76 (BP Location: Right arm, Patient Position: Sitting, BP Cuff Size: Adult)   Pulse 96   Temp 36.9 °C (98.4 °F)   Resp 12   Ht 1.575 m (5' 2\")   Wt 75.8 kg (167 lb)   SpO2 100%   BMI 30.54 kg/m²  Body mass index is 30.54 kg/m².    Gen: Alert and oriented, No apparent distress.  Neck: Neck is supple without lymphadenopathy.  Lungs: Normal effort, CTA bilaterally, no wheezes, rhonchi, or rales  CV: Regular rate and rhythm. No murmurs, rubs, or gallops.  Ext: No clubbing, cyanosis, edema.        Labs: Reviewed from July.    Assessment & Plan:     66 y.o. female with the following -   1. Osteopenia after menopause  Discussed her bone density with a FRAX score for " major osteoporotic fracture 14% and at the hip 3.3%.  Discussed the fact that she is over the threshold to treat at the hip by 0.3%.  She is not excited about taking medications.  She states that she is looked up side effects and is concerned about these.  We did discuss continuing weightbearing exercise, adding some more strength training and possibly some more aerobic type activity as well including continuing her walking.  Discussed calcium and vitamin D supplementation as well.  She is given some recommendations with regard to dosing at least 2 to 3000 units of D daily and [id number] of calcium daily.  We will simply recheck her bone density in the next year to 2 years to see how she is doing.  We did discuss the fact that hypothyroidism may be contributing to some bone loss particularly with an elevated alkaline phosphatase as well.  Complicating the issue is the fact that she does not have a gallbladder and so she might have a high alk phos with this as well.  She will discuss with endocrinology in October at her visit.  Certainly in the interim if she does develop a fracture that would be a reason to start treating with a bone density medication.        No follow-ups on file.    Please note that this dictation was created using voice recognition software. I have made every reasonable attempt to correct obvious errors, but I expect that there are errors of grammar and possibly content that I did not discover before finalizing the note.

## 2021-10-11 ENCOUNTER — OFFICE VISIT (OUTPATIENT)
Dept: ENDOCRINOLOGY | Facility: MEDICAL CENTER | Age: 66
End: 2021-10-11
Attending: NURSE PRACTITIONER
Payer: MEDICARE

## 2021-10-11 VITALS
DIASTOLIC BLOOD PRESSURE: 80 MMHG | BODY MASS INDEX: 31.65 KG/M2 | HEIGHT: 62 IN | WEIGHT: 172 LBS | HEART RATE: 84 BPM | SYSTOLIC BLOOD PRESSURE: 120 MMHG | OXYGEN SATURATION: 95 %

## 2021-10-11 DIAGNOSIS — E04.1 THYROID NODULE: ICD-10-CM

## 2021-10-11 DIAGNOSIS — E05.00 GRAVES DISEASE: ICD-10-CM

## 2021-10-11 DIAGNOSIS — E55.9 VITAMIN D DEFICIENCY: ICD-10-CM

## 2021-10-11 DIAGNOSIS — M85.859 OSTEOPENIA OF NECK OF FEMUR, UNSPECIFIED LATERALITY: ICD-10-CM

## 2021-10-11 PROCEDURE — 99212 OFFICE O/P EST SF 10 MIN: CPT | Performed by: NURSE PRACTITIONER

## 2021-10-11 PROCEDURE — 99214 OFFICE O/P EST MOD 30 MIN: CPT | Performed by: NURSE PRACTITIONER

## 2021-10-11 RX ORDER — CHOLECALCIFEROL (VITAMIN D3) 50 MCG
2000 TABLET ORAL DAILY
COMMUNITY

## 2021-10-11 ASSESSMENT — FIBROSIS 4 INDEX: FIB4 SCORE: 1.13

## 2021-10-11 NOTE — PROGRESS NOTES
Chief Complaint: Consult requested by Riana Scott M.D. for evaluation of Hyperthyroidism.    HPI:     Lashae Hoff is a 66 y.o. female with history of hyperthyroidism diagnosed in her 20s and is here for initial evaluation.  She reports the following symptoms: increased weight, intermittentment anxiousness, heat intolerance, increased fatigue, constipation and diarrhea for several years. She denies tremors, insomnia and palpitations.  She reports lumps or enlargement in the neck. Denies dysphagia and SOB. Denies anterior neck pain.     Patient reports that the symptoms she reports are difficult to delineate between menopause and possible thyroid function.     Patient reports taking beta blocker and another medication in her 20's after her second son. She required this therapy for 2-3 months. Patient also reports radioactive ablation therapy during this time as well. Patient reports she didn't require any further therapy.     She reports a family history of maternal grandmother and aunt with hypothyroidism.  She denies taking thyroid hormone or biotin.  She denies any recent IV contrast exposure. She denies any recent URI or having neck pain.    Historical Review of [facility] shows TSH level chronically 0.50 or lower.     Ref. Range 7/22/2021 11:58   TSH Latest Ref Range: 0.380 - 5.330 uIU/mL 0.006 (L)   Free T-4 Latest Ref Range: 0.93 - 1.70 ng/dL 1.73 (H)   T3 Latest Ref Range: 60.0 - 181.0 ng/dL 130.0   Microsomal -Tpo- Abs Latest Ref Range: 0.0 - 9.0 IU/mL 163.0 (H)   Anti-Thyroglobulin Latest Ref Range: 0.0 - 4.0 IU/mL 264.9 (H)     Patient denies having Thyroid Uptake and Scan.     Previous US of Thyroid with FNA Bx.     FNA Bx 08/30/2018:  A. Right thyroid fine needle aspiration: [place] thyroid category 2: benign. The specimen is of mild to moderate cellularity with many sheets of benign follicular cells. Abundant histiocytes are present, suggesting a cystic component.     US of Thyroid 08/01/2021:  FINDINGS: The thyroid gland is heterogeneous. Vascularity is normal.  The right lobe of the thyroid gland measures 2.95 cm x 5.91 cm x 2.86 cm. The left lobe of the thyroid gland measures 1.99 cm x 5.75 cm x 2.03 cm. The isthmus measures 0.71 cm.    Nodule #1 Location:  Right  mid Size:  3.67 x 2.73 x 2.72 cm Composition:  Mixed-1 Echogenicity:  Isoechoic-1 Shape:  Wider than tall-0 Margins:  Smooth-0 Echogenic Foci:  None-0 ACR TIRADS points/category:  2 - TR2 - Not Suspicious    Nodule #2 Location:  Left  upper Size:  1.55 x 0.88 x 0.49 cm Composition:  Mixed-1 Echogenicity:  Hypoechoic-2 Shape:  Wider than tall-0 Margins:  Smooth-0 Echogenic Foci:  None-0 ACR TIRADS points/category:  3 - TR3 - Mildly Suspicious    IMPRESSION: A 3.6 cm TR2 mixed nodule in the right thyroid gland. A 1.5 cm TR3 nodule in the left thyroid gland.    History of Vitamin D Deficiency. Currently taking Vitamin D 2000 IU daily.      Ref. Range 5/24/2018 09:53   25-Hydroxy   Vitamin D 25 Latest Ref Range: 30 - 100 ng/mL 24 (L)       History of Osteopenia.   Currently taking Calcium 1200mg daily.     DEXA Scan 08/26/2021: FINDINGS: The mean bone mineral density for the lumbar spine is 0.935 g/cm2, which corresponds to a T score of -2.0 and a Z score of -0.8.  The proximal left femur has a mean bone mineral density of 0.726 g/cm2, with a T score of -2.2 and a Z score of -1.3.        Patient's medications, allergies, and social histories were reviewed and updated as appropriate.      ROS:     CONS:     No fever, no chills, no weight loss, no fatigue   EYES:      No diplopia, no blurry vision, no redness of eyes, no swelling of eyelids   ENT:    No hearing loss, No ear pain, No sore throat, no dysphagia, no neck swelling   CV:     No chest pain, no palpitations, no claudication, no orthopnea, no PND   PULM:    No SOB, no cough, no hemoptysis, no wheezing    GI:   No nausea, no vomiting, no diarrhea, no constipation, no bloody stools   :   Passing urine well, no dysuria, no hematuria   ENDO:   No polyuria, no polydipsia, no heat intolerance, no cold intolerance   NEURO: No headaches, no dizziness, no convulsions, no tremors   MUSC:  No joint swellings, no arthralgias, no myalgias, no weakness   SKIN:   No rash, no ulcers, no dry skin   PSYCH:   No depression, no anxiety, no difficulty sleeping       Past Medical History:  Patient Active Problem List    Diagnosis Date Noted   • Thyroid nodule 2021   • Alkaline phosphatase elevation 2018   • Arthritis 2018   • RBBB 2018   • ROMELIA (obstructive sleep apnea) 2016   • Hashimoto's thyroiditis 07/15/2016   • Dyslipidemia, goal LDL below 130        Past Surgical History:  Past Surgical History:   Procedure Laterality Date   • BRAD BY LAPAROSCOPY     • TONSILLECTOMY          Allergies:  Macrobid [nitrofurantoin]     Current Medications:    Current Outpatient Medications:   •  Cholecalciferol (VITAMIN D) 2000 UNIT Tab, Take 2,000 Units by mouth every day., Disp: , Rfl:   •  Calcium Carbonate-Vit D-Min (CALCIUM 1200 PO), Take  by mouth., Disp: , Rfl:   •  Multiple Vitamins-Minerals (PRESERVISION AREDS PO), Take  by mouth., Disp: , Rfl:   •  B Complex Vitamins (VITAMIN B COMPLEX PO), Take  by mouth., Disp: , Rfl:   •  Vitamin A 2250 MCG (7500 UT) Cap, Take  by mouth., Disp: , Rfl:     Social History:  Social History     Socioeconomic History   • Marital status:      Spouse name: Not on file   • Number of children: Not on file   • Years of education: Not on file   • Highest education level: Some college, no degree   Occupational History   • Not on file   Tobacco Use   • Smoking status: Former Smoker     Packs/day: 1.00     Years: 5.00     Pack years: 5.00     Types: Cigarettes     Start date: 1974     Quit date: 1980     Years since quittin.2   • Smokeless tobacco: Never Used   Vaping Use   • Vaping Use: Never used   Substance and Sexual Activity   • Alcohol  "use: Yes     Alcohol/week: 1.2 oz     Types: 2 Glasses of wine per week     Comment: occ   • Drug use: No   • Sexual activity: Yes     Partners: Male     Birth control/protection: Post-Menopausal   Other Topics Concern   • Not on file   Social History Narrative   • Not on file     Social Determinants of Health     Financial Resource Strain: Low Risk    • Difficulty of Paying Living Expenses: Not very hard   Food Insecurity: No Food Insecurity   • Worried About Running Out of Food in the Last Year: Never true   • Ran Out of Food in the Last Year: Never true   Transportation Needs: No Transportation Needs   • Lack of Transportation (Medical): No   • Lack of Transportation (Non-Medical): No   Physical Activity: Sufficiently Active   • Days of Exercise per Week: 7 days   • Minutes of Exercise per Session: 60 min   Stress: No Stress Concern Present   • Feeling of Stress : Only a little   Social Connections: Unknown   • Frequency of Communication with Friends and Family: Once a week   • Frequency of Social Gatherings with Friends and Family: Once a week   • Attends Muslim Services: Not on file   • Active Member of Clubs or Organizations: Not on file   • Attends Club or Organization Meetings: Not on file   • Marital Status:    Intimate Partner Violence:    • Fear of Current or Ex-Partner:    • Emotionally Abused:    • Physically Abused:    • Sexually Abused:         Family History:   Family History   Problem Relation Age of Onset   • Hypertension Mother    • Diabetes Mother         typr II   • Hyperlipidemia Mother    • Arthritis Mother    • Cancer Father         throat   • Arthritis Father         gout   • Diabetes Brother          PHYSICAL EXAM:   Vital signs: /80   Pulse 84   Ht 1.575 m (5' 2\")   Wt 78 kg (172 lb)   SpO2 95%   BMI 31.46 kg/m²   GENERAL: Well-developed, well-nourished  in no apparent distress.   EYE: No ocular and eyelid asymmetry, Anicteric sclerae,  PERRL, No exophthalmos or " lidlag  HENT: Hearing grossly intact, Normocephalic, atraumatic. Pink, moist mucous membranes, No exudate  NECK: Supple. Trachea midline. Palpated lump to R thyroid.  CARDIOVASCULAR: Regular rate and rhythm. No murmurs, rubs, or gallops.   LUNGS: Clear to auscultation bilaterally   ABDOMEN: Soft, nontender with positive bowel sounds.   EXTREMITIES: No clubbing, cyanosis, or edema.   NEUROLOGICAL: Cranial nerves II-XII are grossly intact   Symmetric reflexes at the patella no proximal muscle weakness, No visible tremor with both outstretched hands  LYMPH: No cervical, supraclavicular,  adenopathy palpated.   SKIN: No rashes, lesions. Turgor is normal.    ASSESSMENT/PLAN:     1. Graves disease  Unstable.   TPO Abs and Anti-thyroglobulin positive.  Recommend repeating labs. If TSH remains suppressed, recommend Thyroid Uptake and Scan and then start thionamide therapy.     Future Lab Orders:     - FREE THYROXINE; Future  - T3 FREE; Future  - TSH; Future    2. Thyroid nodule  Stable.   Recommend POC US with Dr. Deluca in 1 year.   FNA Biopsy not indicated at this time.   Patient denies compressive symptoms currently.     3. Vitamin D deficiency  Unstable.   Recommend increasing Vitamin D 4000IU daily.     4. Osteopenia of neck of femur, unspecified laterality  Stable.   Recommend increasing Calcium to 2000mg daily.     Complete labs now. Then again 1-2 weeks before next appointment.   Next appointment in 3 months.   US of Thyroid in 1 year with Dr. Deluca.       Thank you kindly for allowing me to participate in the thyroid care plan for this patient.    Rosio Angulo, ALICE  10/11/21    CC:   Riana Scott M.D.

## 2021-12-03 ENCOUNTER — HOSPITAL ENCOUNTER (OUTPATIENT)
Dept: LAB | Facility: MEDICAL CENTER | Age: 66
End: 2021-12-03
Attending: NURSE PRACTITIONER
Payer: MEDICARE

## 2021-12-03 DIAGNOSIS — E05.00 GRAVES DISEASE: ICD-10-CM

## 2021-12-03 LAB
T3FREE SERPL-MCNC: 3.57 PG/ML (ref 2–4.4)
T4 FREE SERPL-MCNC: 1.46 NG/DL (ref 0.93–1.7)
TSH SERPL DL<=0.005 MIU/L-ACNC: <0.005 UIU/ML (ref 0.38–5.33)

## 2021-12-03 PROCEDURE — 84443 ASSAY THYROID STIM HORMONE: CPT

## 2021-12-03 PROCEDURE — 84481 FREE ASSAY (FT-3): CPT

## 2021-12-03 PROCEDURE — 84439 ASSAY OF FREE THYROXINE: CPT

## 2021-12-03 PROCEDURE — 36415 COLL VENOUS BLD VENIPUNCTURE: CPT

## 2022-01-14 ENCOUNTER — TELEPHONE (OUTPATIENT)
Dept: ENDOCRINOLOGY | Facility: MEDICAL CENTER | Age: 67
End: 2022-01-14

## 2022-04-15 ENCOUNTER — OFFICE VISIT (OUTPATIENT)
Dept: ENDOCRINOLOGY | Facility: MEDICAL CENTER | Age: 67
End: 2022-04-15
Attending: INTERNAL MEDICINE
Payer: MEDICARE

## 2022-04-15 VITALS
OXYGEN SATURATION: 97 % | BODY MASS INDEX: 34.04 KG/M2 | HEIGHT: 62 IN | WEIGHT: 185 LBS | SYSTOLIC BLOOD PRESSURE: 118 MMHG | HEART RATE: 72 BPM | DIASTOLIC BLOOD PRESSURE: 80 MMHG

## 2022-04-15 DIAGNOSIS — E05.90 SUBCLINICAL HYPERTHYROIDISM: ICD-10-CM

## 2022-04-15 DIAGNOSIS — M85.859 OSTEOPENIA OF NECK OF FEMUR, UNSPECIFIED LATERALITY: ICD-10-CM

## 2022-04-15 DIAGNOSIS — E04.1 THYROID NODULE: ICD-10-CM

## 2022-04-15 DIAGNOSIS — E55.9 VITAMIN D DEFICIENCY: ICD-10-CM

## 2022-04-15 PROBLEM — E05.00 GRAVES DISEASE: Status: RESOLVED | Noted: 2022-04-15 | Resolved: 2022-04-15

## 2022-04-15 PROBLEM — E05.00 GRAVES DISEASE: Status: ACTIVE | Noted: 2022-04-15

## 2022-04-15 PROCEDURE — 99212 OFFICE O/P EST SF 10 MIN: CPT | Performed by: INTERNAL MEDICINE

## 2022-04-15 PROCEDURE — 99214 OFFICE O/P EST MOD 30 MIN: CPT | Performed by: INTERNAL MEDICINE

## 2022-04-15 ASSESSMENT — FIBROSIS 4 INDEX: FIB4 SCORE: 1.15

## 2022-04-16 NOTE — PROGRESS NOTES
Chief Complaint: Follow up for subclinical hyperthyroidism and  thyroid nodule,  osteopenia and vitamin D deficiency    HPI:     Lashae Hoff is a 67 y.o. female here for follow up of of the above medical issues.    She was previously seen by another endocrinologist  Interestingly she was being given estrogen and progesterone by her previous endocrinologist but her abnormal thyroid labs were not adequately addressed.  Per her last endocrinologist: low TSH Unclear etiology    She has not had a thyroid uptake and scan    She has had serial progression of subclinical hyperthyroidism since May 2018:    TSH was 0.090 with a free T4 of 1.02 and elevated free T3 of 4.2 on May 2018    TSH was lower at 0.070 with normal free T4 1.0 and free T3 of 3.65 on September 2018    TSH was more suppressed at 0.006 with an elevated free T4 of 1.73 and total T3 of 130 on July 2021    TSH was undetectable at less than 0.005 with a free T4 of 1.46 and free T3 of 3.57 on December 3, 2021      She admits to symptoms of subclinical hyperthyroidism such as occasional palpitations, heat intolerance, anxiety and difficulty sleeping.  She denies unexplained weight loss.  Her TPO antibodies were elevated, thyrotropin receptor antibodies were not elevated.  TSI was not measured.    She has a dominant hypervascular 3.67 cm hypoechoic complex nodule on the right mid lobe which was biopsied in 2018 with benign findings.  She also has a hypoechoic solid nodule measuring 1.5 cm on the left upper lobe.        Her last bone density on August 26, 2021 showed the lowest T score of -2.2 for the left hip with an elevated hip FRAX score of 3.3% compatible with increased fracture risk    She is not on bisphosphonate therapy and calcium and vitamin D levels are not available          Patient's medications, allergies, and social histories were reviewed and updated as appropriate.      ROS:     CONS:     No fever, no chills   EYES:     No diplopia, no  "blurry vision   CV:           No chest pain, no palpitations   PULM:     No SOB, no cough, no hemoptysis.   GI:            No nausea, no vomiting, no diarrhea, no constipation   ENDO:     No polyuria, no polydipsia, no heat intolerance, no cold intolerance       Past Medical History:  Problem List:  2022: Subclinical hyperthyroidism  2022: Graves disease  2022: Osteopenia of neck of femur  2022: Vitamin D deficiency  2021: Thyroid nodule  2018: Alkaline phosphatase elevation  2018: Arthritis  2018: Obesity (BMI 35.0-39.9 without comorbidity) ([facility])  2018: RBBB  2018: Enlarged thyroid gland  2016: ROMELIA (obstructive sleep apnea)  2016: Hypersomnolence  2016: Hashimoto's thyroiditis  2016: Elevated fasting glucose  Dyslipidemia, goal LDL below 130  GERD (gastroesophageal reflux disease)      Past Surgical History:  Past Surgical History:   Procedure Laterality Date   • BRAD BY LAPAROSCOPY     • TONSILLECTOMY          Allergies:  Macrobid [nitrofurantoin]     Social History:  Social History     Tobacco Use   • Smoking status: Former Smoker     Packs/day: 1.00     Years: 5.00     Pack years: 5.00     Types: Cigarettes     Start date: 1974     Quit date: 1980     Years since quittin.8   • Smokeless tobacco: Never Used   Vaping Use   • Vaping Use: Never used   Substance Use Topics   • Alcohol use: Yes     Alcohol/week: 1.2 oz     Types: 2 Glasses of wine per week     Comment: occ   • Drug use: No        Family History:   family history includes Arthritis in her father and mother; Cancer in her father; Diabetes in her brother and mother; Hyperlipidemia in her mother; Hypertension in her mother.      PHYSICAL EXAM:   Vital signs: /80   Pulse 72   Ht 1.575 m (5' 2\")   Wt 83.9 kg (185 lb)   SpO2 97%   BMI 33.84 kg/m²   GENERAL: Well-developed, well-nourished in no apparent distress.   EYE:  No ocular asymmetry, PERRLA, No exophthalmos or lid lag  HENT: Pink, " moist mucous membranes.    NECK: Thyroid is enlarged with a palpable greater than 3 cm nodule in the right mid lobe  CARDIOVASCULAR:  No murmurs  LUNGS: Clear breath sounds  ABDOMEN: Soft, nontender   EXTREMITIES: No clubbing, cyanosis, or edema.   NEUROLOGICAL: No gross focal motor abnormalities, No visible tremors with both hands  LYMPH: No cervical adenopathy palpated.   SKIN: No rashes, lesions.     Labs:  Lab Results   Component Value Date/Time    WBC 7.2 2021 11:58 AM    RBC 4.97 2021 11:58 AM    HEMOGLOBIN 15.9 2021 11:58 AM    MCV 92.6 2021 11:58 AM    MCH 32.0 2021 11:58 AM    MCHC 34.6 2021 11:58 AM    RDW 43.1 2021 11:58 AM    MPV 10.9 2021 11:58 AM       Lab Results   Component Value Date/Time    SODIUM 137 2021 11:58 AM    POTASSIUM 4.1 2021 11:58 AM    CHLORIDE 101 2021 11:58 AM    CO2 25 2021 11:58 AM    ANION 11.0 2021 11:58 AM    GLUCOSE 93 2021 11:58 AM    BUN 10 2021 11:58 AM    CREATININE 0.47 (L) 2021 11:58 AM    CALCIUM 9.3 2021 11:58 AM    ASTSGOT 16 2021 11:58 AM    ALTSGPT 20 2021 11:58 AM    TBILIRUBIN 0.4 2021 11:58 AM    ALBUMIN 4.4 2021 11:58 AM    TOTPROTEIN 7.2 2021 11:58 AM    GLOBULIN 2.8 2021 11:58 AM    AGRATIO 1.6 2021 11:58 AM       Lab Results   Component Value Date/Time    TSHULTRASEN <0.005 (L) 2021 1159     Lab Results   Component Value Date/Time    FREET4 1.46 2021 1159     Lab Results   Component Value Date/Time    FREET3 3.57 2021 1159     No results found for: THYSTIMIG      Imagin2021 2:09 PM     HISTORY/REASON FOR EXAM:  Hashimoto's thyroiditis, thyroid nodule     TECHNIQUE/EXAM DESCRIPTION:  Ultrasound of the soft tissues of the head and neck.     COMPARISON:  Thyroid ultrasound 2018     FINDINGS:  The thyroid gland is heterogeneous.  Vascularity is normal.     The right lobe of the thyroid gland  measures 2.95 cm x 5.91 cm x 2.86 cm.  The left lobe of the thyroid gland measures 1.99 cm x 5.75 cm x 2.03 cm.  The isthmus measures 0.71 cm.     Nodules >= 1cm:        Nodule #1  Location:  Right  mid  Size:  3.67 x 2.73 x 2.72 cm  Composition:  Mixed-1  Echogenicity:  Isoechoic-1  Shape:  Wider than tall-0  Margins:  Smooth-0  Echogenic Foci:  None-0     ACR TIRADS points/category:  2 - TR2 - Not Suspicious     Nodule #2  Location:  Left  upper  Size:  1.55 x 0.88 x 0.49 cm  Composition:  Mixed-1  Echogenicity:  Hypoechoic-2  Shape:  Wider than tall-0  Margins:  Smooth-0  Echogenic Foci:  None-0     ACR TIRADS points/category:  3 - TR3 - Mildly Suspicious           IMPRESSION:     A 3.6 cm TR2 mixed nodule in the right thyroid gland.  A 1.5 cm TR3 nodule in the left thyroid gland.     ACR TI-RADS Recommendations  TR3 - follow up ultrasound in 1, 3, and 5 years.     Recommendations based on the American College of [employer]iology Thyroid imaging, reporting and Data System (TI-RADS) 2017.      ASSESSMENT/PLAN:     1. Subclinical hyperthyroidism  Unstable  Progressive suppression of TSH levels noted  TSH is suppressed with normal free T4 and free T3 levels compatible with subclinical hyperthyroidism  She is symptomatic  She is of advanced age  Recommend that she get a thyroid uptake and scan to help confirm the underlying etiology of her subclinical hyperthyroidism  Overall I suspect that she has a toxic adenoma corresponding to the dominant nodule on the right lobe that was previously biopsied  We discussed the therapeutic options for subclinical hyperthyroidism which includes medical therapy, radioactive iodine therapy and surgery if she has a toxic adenoma  We will decide after we get the results of her thyroid uptake and scan  I want to see her again in 6 months with repeat thyroid labs    2. Thyroid nodule  Stable  We are getting a thyroid uptake and scan to confirm if she has a hot nodule or toxic adenoma  corresponding to the previously biopsied 3.67 cm complex nodule in the right mid lobe  Recommend observation of her thyroid nodules and repeating ultrasound again later this year    3. Osteopenia of neck of femur, unspecified laterality  Unstable  Reviewed increased fracture risk with her osteopenia based on FRAX scores  Overall recommend bisphosphonate therapy but patient wants to hold off until work-up for her thyroid is complete which is reasonable  Recommend regular weightbearing exercise  Recommend calcium and vitamin D supplementation  Bone density should be repeated on August 27, 2023    4. Vitamin D deficiency  We will check calcium vitamin D levels with next labs in 6 months      Return in about 6 months (around 10/15/2022).       Thank you kindly for allowing me to participate in the thyroid care plan for this patient.    Monico Deluca MD, [facility], [facility]  04/15/22    CC:   Riana Scott M.D.

## 2022-05-11 ENCOUNTER — TELEPHONE (OUTPATIENT)
Dept: MEDICAL GROUP | Facility: LAB | Age: 67
End: 2022-05-11
Payer: MEDICARE

## 2022-05-11 NOTE — TELEPHONE ENCOUNTER
Left message for patient to call back regarding pre-visit planning. Please transfer call to [phone].  I lvm to see if she is coming for an AWV or a visit concerning stomach issues?     Left two msgs

## 2022-05-13 ENCOUNTER — OFFICE VISIT (OUTPATIENT)
Dept: MEDICAL GROUP | Facility: LAB | Age: 67
End: 2022-05-13
Payer: MEDICARE

## 2022-05-13 VITALS
HEIGHT: 62 IN | OXYGEN SATURATION: 97 % | SYSTOLIC BLOOD PRESSURE: 130 MMHG | HEART RATE: 103 BPM | DIASTOLIC BLOOD PRESSURE: 82 MMHG | TEMPERATURE: 97.9 F | BODY MASS INDEX: 33.86 KG/M2 | RESPIRATION RATE: 12 BRPM | WEIGHT: 184 LBS

## 2022-05-13 DIAGNOSIS — Z00.00 ENCOUNTER FOR MEDICARE ANNUAL WELLNESS EXAM: ICD-10-CM

## 2022-05-13 DIAGNOSIS — E06.3 HASHIMOTO'S THYROIDITIS: ICD-10-CM

## 2022-05-13 DIAGNOSIS — E78.5 DYSLIPIDEMIA, GOAL LDL BELOW 130: ICD-10-CM

## 2022-05-13 DIAGNOSIS — Z11.59 NEED FOR HEPATITIS C SCREENING TEST: ICD-10-CM

## 2022-05-13 DIAGNOSIS — E04.1 THYROID NODULE: ICD-10-CM

## 2022-05-13 DIAGNOSIS — R14.0 BLOATING: ICD-10-CM

## 2022-05-13 PROCEDURE — G0439 PPPS, SUBSEQ VISIT: HCPCS | Performed by: FAMILY MEDICINE

## 2022-05-13 PROCEDURE — 99212 OFFICE O/P EST SF 10 MIN: CPT | Mod: 25 | Performed by: FAMILY MEDICINE

## 2022-05-13 ASSESSMENT — ACTIVITIES OF DAILY LIVING (ADL): BATHING_REQUIRES_ASSISTANCE: 0

## 2022-05-13 ASSESSMENT — FIBROSIS 4 INDEX: FIB4 SCORE: 1.15

## 2022-05-13 ASSESSMENT — ENCOUNTER SYMPTOMS: GENERAL WELL-BEING: GOOD

## 2022-05-13 ASSESSMENT — PATIENT HEALTH QUESTIONNAIRE - PHQ9: CLINICAL INTERPRETATION OF PHQ2 SCORE: 0

## 2022-05-13 NOTE — PROGRESS NOTES
Chief Complaint   Patient presents with   • Annual Exam     Stomach issues         HPI:  Lashae is a 67 y.o. here for Medicare Annual Wellness Visit    Also some stomach concerns:   Couple months now. Lots more bloating, causing some back pain as well. More Loose bowels of late, but sometimes constipation as well. Not associated with eating or foods. No nausea. Upper abdominal pain.  Gallbladder removed in 2013 due to stones.    Sees ob/gyn, Pollack retired and is taking over by Arguello.   Diet: Low carb, veggies seem ok, but salad sets off diarrhea.   DHA colonoscopy 2018. 10 years.     Patient Active Problem List    Diagnosis Date Noted   • Subclinical hyperthyroidism 04/15/2022   • Osteopenia of neck of femur 04/15/2022   • Vitamin D deficiency 04/15/2022   • Thyroid nodule 07/19/2021   • Alkaline phosphatase elevation 01/12/2018   • Arthritis 01/12/2018   • RBBB 01/12/2018   • ROMELIA (obstructive sleep apnea) 07/28/2016   • Dyslipidemia, goal LDL below 130        Current Outpatient Medications   Medication Sig Dispense Refill   • Cholecalciferol (VITAMIN D) 2000 UNIT Tab Take 2,000 Units by mouth every day.     • Calcium Carbonate-Vit D-Min (CALCIUM 1200 PO) Take  by mouth. Calcium 2000 mg daily     • Multiple Vitamins-Minerals (PRESERVISION AREDS PO) Take  by mouth.       No current facility-administered medications for this visit.        Patient is taking medications as noted in medication list.  Current supplements as per medication list.     Allergies: Macrobid [nitrofurantoin]    Current social contact/activities: active with friends and family.     Is patient current with immunizations? Yes.    She  reports that she quit smoking about 41 years ago. Her smoking use included cigarettes. She started smoking about 47 years ago. She has a 5.00 pack-year smoking history. She has never used smokeless tobacco. She reports current alcohol use of about 1.2 oz of alcohol per week. She reports that she does not use  drugs.  Counseling given: Not Answered        DPA/Advanced directive: Patient does not have an Advanced Directive.  A packet and workshop information was given on Advanced Directives.    ROS:    Gait: Uses no assistive device   Ostomy: No   Other tubes: No   Amputations: No   Chronic oxygen use No   Last eye exam 2021   Wears hearing aids: No   : Denies any urinary leakage during the last 6 months      Screening:        Depression Screening  Little interest or pleasure in doing things?  0 - not at all  Feeling down, depressed, or hopeless? 0 - not at all  Patient Health Questionnaire Score: 0    If depressive symptoms identified deferred to follow up visit unless specifically addressed in assessment and plan.    Interpretation of PHQ-9 Total Score   Score Severity   1-4 No Depression   5-9 Mild Depression   10-14 Moderate Depression   15-19 Moderately Severe Depression   20-27 Severe Depression    Screening for Cognitive Impairment  Three Minute Recall (daughter, heaven, mountain)  3/3    Dav clock face with all 12 numbers and set the hands to show 10 past 11.  Yes    If cognitive concerns identified, deferred for follow up unless specifically addressed in assessment and plan.    Fall Risk Assessment  Has the patient had two or more falls in the last year or any fall with injury in the last year?  No  If fall risk identified, deferred for follow up unless specifically addressed in assessment and plan.    Safety Assessment  Throw rugs on floor.  No  Handrails on all stairs.  Yes  Good lighting in all hallways.  Yes  Difficulty hearing.  No  Patient counseled about all safety risks that were identified.    Functional Assessment ADLs  Are there any barriers preventing you from cooking for yourself or meeting nutritional needs?  No.    Are there any barriers preventing you from driving safely or obtaining transportation?  No.    Are there any barriers preventing you from using a telephone or calling for help?  No.     Are there any barriers preventing you from shopping?  No.    Are there any barriers preventing you from taking care of your own finances?  No.    Are there any barriers preventing you from managing your medications?  No.    Are there any barriers preventing you from showering, bathing or dressing yourself?  No.    Are you currently engaging in any exercise or physical activity?  Yes.     What is your perception of your health?  Good.    Health Maintenance Summary          Overdue - HEPATITIS C SCREENING (Once) Order placed this encounter    No completion history exists for this topic.          Overdue - COLORECTAL CANCER SCREENING (COLONOSCOPY - Every 10 Years) Overdue - never done    No completion history exists for this topic.          Overdue - IMM ZOSTER VACCINES (1 of 2) Overdue - never done    No completion history exists for this topic.          Ordered - MAMMOGRAM (Yearly) Ordered on 7/19/2021 07/01/2014  Previously completed          Overdue - COVID-19 Vaccine (3 - Booster for Pfizer series) Overdue since 10/21/2021    05/21/2021  Imm Admin: PFIZER PURPLE CAP SARS-COV-2 VACCINATION (12+)    04/30/2021  Imm Admin: PFIZER PURPLE CAP SARS-COV-2 VACCINATION (12+)          Postponed - IMM DTaP/Tdap/Td Vaccine (1 - Tdap) Postponed until 7/19/2022    No completion history exists for this topic.          Postponed - IMM PNEUMOCOCCAL VACCINE: 65+ Years (1 - PCV) Postponed until 7/19/2022    No completion history exists for this topic.          IMM INFLUENZA (Season Ended) Next due on 9/1/2022 12/05/2020  Imm Admin: Influenza Vaccine Quad Inj (Pf)          BONE DENSITY (Every 5 Years) Next due on 8/26/2026 08/26/2021  DS-BONE DENSITY STUDY (DEXA)          Annual Wellness Visit  Completed    05/13/2022  Visit Dx: Encounter for Medicare annual wellness exam          IMM HEP B VACCINE (Series Information) Aged Out    No completion history exists for this topic.          IMM MENINGOCOCCAL VACCINE (MCV4)  "(Series Information) Aged Out    No completion history exists for this topic.          Discontinued - PAP SMEAR  Discontinued    2016  Previously completed - Dr. Lozano                Patient Care Team:  Riana Scott M.D. as PCP - General (Family Medicine)  Verus  as Respiratory Therapist  Jaylen Brewster M.D. as Consulting Physician (Endocrinology)    Social History     Tobacco Use   • Smoking status: Former Smoker     Packs/day: 1.00     Years: 5.00     Pack years: 5.00     Types: Cigarettes     Start date: 1974     Quit date: 1980     Years since quittin.8   • Smokeless tobacco: Never Used   Vaping Use   • Vaping Use: Never used   Substance Use Topics   • Alcohol use: Yes     Alcohol/week: 1.2 oz     Types: 2 Glasses of wine per week     Comment: occ   • Drug use: No     Family History   Problem Relation Age of Onset   • Hypertension Mother    • Diabetes Mother         typr II   • Hyperlipidemia Mother    • Arthritis Mother    • Cancer Father         throat   • Arthritis Father         gout   • Diabetes Brother      She  has a past medical history of Allergy, Arthritis of knee, right, Asthma, Blood transfusion without reported diagnosis, Bruxism, Chickenpox, Dyslipidemia, goal LDL below 130, Elevated fasting glucose, GERD (gastroesophageal reflux disease), Head ache, History of asthma, History of hyperthyroidism, Snoring, and Thyroid disease.   Past Surgical History:   Procedure Laterality Date   • BRAD BY LAPAROSCOPY     • TONSILLECTOMY             Exam:     /82 (BP Location: Right arm, Patient Position: Sitting, BP Cuff Size: Adult)   Pulse (!) 103   Temp 36.6 °C (97.9 °F)   Resp 12   Ht 1.575 m (5' 2\")   Wt 83.5 kg (184 lb)   SpO2 97%  Body mass index is 33.65 kg/m².    Hearing excellent.    Dentition good  Alert, oriented in no acute distress.  Eye contact is good, speech goal directed, affect calm  RRR, CTAB    Assessment and Plan. The following treatment and " monitoring plan is recommended:    1. Encounter for Medicare annual wellness exam     2. Dyslipidemia, goal LDL below 130  Lipid Profile   3. Bloating  US-ABDOMEN COMPLETE SURVEY   4. Hashimoto's thyroiditis  Referral to Endocrinology   5. Thyroid nodule  Referral to Endocrinology   6. Need for hepatitis C screening test       Discussed routine preventative care.  She is not due for any services at this time other than a lipid panel to add to her labs and endocrinology has ordered.    Discussed concern for bloating and possibility for dietary intolerance but also other more concerning things like uterine or ovarian pathology.  We will go ahead and start with a abdominal ultrasound on the move forward from there.    Services suggested: No services needed at this time  Health Care Screening recommendations as per orders if indicated.  Referrals offered: PT/OT/Nutrition counseling/Behavioral Health/Smoking cessation as per orders if indicated.    Discussion today about general wellness and lifestyle habits:    · Prevent falls and reduce trip hazards; Cautioned about securing or removing rugs.  · Have a working fire alarm and carbon monoxide detector;   · Engage in regular physical activity and social activities       Follow-up: No follow-ups on file.

## 2022-05-31 ENCOUNTER — HOSPITAL ENCOUNTER (OUTPATIENT)
Dept: LAB | Facility: MEDICAL CENTER | Age: 67
End: 2022-05-31
Attending: INTERNAL MEDICINE
Payer: MEDICARE

## 2022-05-31 ENCOUNTER — HOSPITAL ENCOUNTER (OUTPATIENT)
Dept: LAB | Facility: MEDICAL CENTER | Age: 67
End: 2022-05-31
Attending: FAMILY MEDICINE
Payer: MEDICARE

## 2022-05-31 DIAGNOSIS — M85.859 OSTEOPENIA OF NECK OF FEMUR, UNSPECIFIED LATERALITY: ICD-10-CM

## 2022-05-31 DIAGNOSIS — E04.1 THYROID NODULE: ICD-10-CM

## 2022-05-31 DIAGNOSIS — E55.9 VITAMIN D DEFICIENCY: ICD-10-CM

## 2022-05-31 DIAGNOSIS — E78.5 DYSLIPIDEMIA, GOAL LDL BELOW 130: ICD-10-CM

## 2022-05-31 DIAGNOSIS — Z11.59 NEED FOR HEPATITIS C SCREENING TEST: ICD-10-CM

## 2022-05-31 DIAGNOSIS — E05.90 SUBCLINICAL HYPERTHYROIDISM: ICD-10-CM

## 2022-05-31 LAB
25(OH)D3 SERPL-MCNC: 31 NG/ML (ref 30–100)
ALBUMIN SERPL BCP-MCNC: 4.5 G/DL (ref 3.2–4.9)
ALBUMIN/GLOB SERPL: 1.9 G/DL
ALP SERPL-CCNC: 144 U/L (ref 30–99)
ALT SERPL-CCNC: 14 U/L (ref 2–50)
ANION GAP SERPL CALC-SCNC: 13 MMOL/L (ref 7–16)
AST SERPL-CCNC: 15 U/L (ref 12–45)
BILIRUB SERPL-MCNC: 0.3 MG/DL (ref 0.1–1.5)
BUN SERPL-MCNC: 9 MG/DL (ref 8–22)
CALCIUM SERPL-MCNC: 9.1 MG/DL (ref 8.5–10.5)
CHLORIDE SERPL-SCNC: 105 MMOL/L (ref 96–112)
CHOLEST SERPL-MCNC: 242 MG/DL (ref 100–199)
CO2 SERPL-SCNC: 22 MMOL/L (ref 20–33)
CREAT SERPL-MCNC: 0.51 MG/DL (ref 0.5–1.4)
FASTING STATUS PATIENT QL REPORTED: NORMAL
FASTING STATUS PATIENT QL REPORTED: NORMAL
GFR SERPLBLD CREATININE-BSD FMLA CKD-EPI: 103 ML/MIN/1.73 M 2
GLOBULIN SER CALC-MCNC: 2.4 G/DL (ref 1.9–3.5)
GLUCOSE SERPL-MCNC: 106 MG/DL (ref 65–99)
HCV AB SER QL: NORMAL
HDLC SERPL-MCNC: 75 MG/DL
LDLC SERPL CALC-MCNC: 147 MG/DL
POTASSIUM SERPL-SCNC: 3.9 MMOL/L (ref 3.6–5.5)
PROT SERPL-MCNC: 6.9 G/DL (ref 6–8.2)
PTH-INTACT SERPL-MCNC: 86 PG/ML (ref 14–72)
SODIUM SERPL-SCNC: 140 MMOL/L (ref 135–145)
T3FREE SERPL-MCNC: 3.68 PG/ML (ref 2–4.4)
T4 FREE SERPL-MCNC: 1.46 NG/DL (ref 0.93–1.7)
TRIGL SERPL-MCNC: 98 MG/DL (ref 0–149)
TSH SERPL DL<=0.005 MIU/L-ACNC: 0.01 UIU/ML (ref 0.38–5.33)

## 2022-05-31 PROCEDURE — 84445 ASSAY OF TSI GLOBULIN: CPT

## 2022-05-31 PROCEDURE — 84439 ASSAY OF FREE THYROXINE: CPT

## 2022-05-31 PROCEDURE — 86335 IMMUNFIX E-PHORSIS/URINE/CSF: CPT

## 2022-05-31 PROCEDURE — 84443 ASSAY THYROID STIM HORMONE: CPT

## 2022-05-31 PROCEDURE — 36415 COLL VENOUS BLD VENIPUNCTURE: CPT

## 2022-05-31 PROCEDURE — 82306 VITAMIN D 25 HYDROXY: CPT

## 2022-05-31 PROCEDURE — 83520 IMMUNOASSAY QUANT NOS NONAB: CPT

## 2022-05-31 PROCEDURE — 80061 LIPID PANEL: CPT

## 2022-05-31 PROCEDURE — 80053 COMPREHEN METABOLIC PANEL: CPT

## 2022-05-31 PROCEDURE — 84165 PROTEIN E-PHORESIS SERUM: CPT

## 2022-05-31 PROCEDURE — 83970 ASSAY OF PARATHORMONE: CPT

## 2022-05-31 PROCEDURE — 84481 FREE ASSAY (FT-3): CPT

## 2022-05-31 PROCEDURE — 84156 ASSAY OF PROTEIN URINE: CPT

## 2022-05-31 PROCEDURE — 84155 ASSAY OF PROTEIN SERUM: CPT | Mod: XU

## 2022-05-31 PROCEDURE — 84166 PROTEIN E-PHORESIS/URINE/CSF: CPT

## 2022-05-31 PROCEDURE — 86803 HEPATITIS C AB TEST: CPT

## 2022-06-01 LAB — TSH RECEP AB SER-ACNC: 1.18 IU/L

## 2022-06-02 LAB
ALBUMIN SERPL ELPH-MCNC: 4.1 G/DL (ref 3.75–5.01)
ALPHA1 GLOB SERPL ELPH-MCNC: 0.34 G/DL (ref 0.19–0.46)
ALPHA2 GLOB SERPL ELPH-MCNC: 0.73 G/DL (ref 0.48–1.05)
B-GLOBULIN SERPL ELPH-MCNC: 0.85 G/DL (ref 0.48–1.1)
EER PROT ELECT SER Q1092: NORMAL
GAMMA GLOB SERPL ELPH-MCNC: 0.78 G/DL (ref 0.62–1.51)
INTERPRETATION SERPL IFE-IMP: NORMAL
PROT SERPL-MCNC: 6.8 G/DL (ref 6.3–8.2)
TSI SER-ACNC: 0.61 IU/L

## 2022-06-03 ENCOUNTER — HOSPITAL ENCOUNTER (OUTPATIENT)
Dept: RADIOLOGY | Facility: MEDICAL CENTER | Age: 67
End: 2022-06-03
Attending: FAMILY MEDICINE
Payer: MEDICARE

## 2022-06-03 DIAGNOSIS — R14.0 BLOATING: ICD-10-CM

## 2022-06-03 PROCEDURE — 76700 US EXAM ABDOM COMPLETE: CPT

## 2022-06-06 LAB
ALBUMIN 24H MFR UR ELPH: 100 %
ALPHA1 GLOB 24H MFR UR ELPH: 0 %
ALPHA2 GLOB 24H MFR UR ELPH: 0 %
B-GLOBULIN 24H MFR UR ELPH: 0 %
COLLECT DURATION TIME SPEC: NORMAL HRS
EER MONOCLONAL PROTEIN STUDY, 24 HOUR U Q5964: NORMAL
GAMMA GLOB 24H MFR UR ELPH: 0 %
INTERPRETATION UR IFE-IMP: NORMAL
M PROTEIN 24H MFR UR ELPH: 0 %
M PROTEIN 24H UR ELPH-MRATE: NORMAL MG/24 HRS
PROT 24H UR-MRATE: NORMAL MG/D (ref 40–150)
PROT UR-MCNC: <6 MG/DL
SPECIMEN VOL ?TM UR: NORMAL ML

## 2022-06-21 ENCOUNTER — APPOINTMENT (OUTPATIENT)
Dept: RADIOLOGY | Facility: MEDICAL CENTER | Age: 67
End: 2022-06-21
Attending: INTERNAL MEDICINE
Payer: MEDICARE

## 2022-06-28 ENCOUNTER — HOSPITAL ENCOUNTER (OUTPATIENT)
Dept: RADIOLOGY | Facility: MEDICAL CENTER | Age: 67
End: 2022-06-28
Attending: INTERNAL MEDICINE
Payer: MEDICARE

## 2022-06-28 DIAGNOSIS — E04.1 THYROID NODULE: ICD-10-CM

## 2022-06-28 DIAGNOSIS — E05.90 SUBCLINICAL HYPERTHYROIDISM: ICD-10-CM

## 2022-06-28 PROCEDURE — A9516 IODINE I-123 SOD IODIDE MIC: HCPCS

## 2022-06-28 RX ORDER — METHIMAZOLE 10 MG/1
10 TABLET ORAL DAILY
Qty: 30 TABLET | Refills: 6 | Status: SHIPPED
Start: 2022-06-28 | End: 2023-05-11

## 2022-09-06 ENCOUNTER — HOSPITAL ENCOUNTER (OUTPATIENT)
Dept: RADIOLOGY | Facility: MEDICAL CENTER | Age: 67
End: 2022-09-06
Attending: OBSTETRICS & GYNECOLOGY
Payer: MEDICARE

## 2022-09-06 DIAGNOSIS — R14.0 ABDOMINAL BLOATING: ICD-10-CM

## 2022-09-06 PROCEDURE — 76830 TRANSVAGINAL US NON-OB: CPT

## 2022-09-16 ENCOUNTER — OFFICE VISIT (OUTPATIENT)
Dept: MEDICAL GROUP | Facility: LAB | Age: 67
End: 2022-09-16
Payer: MEDICARE

## 2022-09-16 VITALS
OXYGEN SATURATION: 100 % | DIASTOLIC BLOOD PRESSURE: 86 MMHG | WEIGHT: 182 LBS | HEIGHT: 62 IN | SYSTOLIC BLOOD PRESSURE: 134 MMHG | RESPIRATION RATE: 12 BRPM | HEART RATE: 98 BPM | TEMPERATURE: 97.6 F | BODY MASS INDEX: 33.49 KG/M2

## 2022-09-16 DIAGNOSIS — R05.9 COUGH: ICD-10-CM

## 2022-09-16 DIAGNOSIS — J01.00 SUBACUTE MAXILLARY SINUSITIS: ICD-10-CM

## 2022-09-16 PROCEDURE — 99213 OFFICE O/P EST LOW 20 MIN: CPT | Performed by: FAMILY MEDICINE

## 2022-09-16 RX ORDER — AMOXICILLIN AND CLAVULANATE POTASSIUM 875; 125 MG/1; MG/1
1 TABLET, FILM COATED ORAL 2 TIMES DAILY
Qty: 14 TABLET | Refills: 0 | Status: SHIPPED | OUTPATIENT
Start: 2022-09-16 | End: 2022-09-23

## 2022-09-16 RX ORDER — CODEINE PHOSPHATE/GUAIFENESIN 10-100MG/5
5 LIQUID (ML) ORAL 3 TIMES DAILY PRN
Qty: 120 ML | Refills: 0 | Status: SHIPPED | OUTPATIENT
Start: 2022-09-16 | End: 2022-09-21

## 2022-09-16 ASSESSMENT — FIBROSIS 4 INDEX: FIB4 SCORE: 1.29

## 2022-09-16 NOTE — PROGRESS NOTES
"Subjective:     Chief Complaint   Patient presents with    Cough     X 3 weeks No fever drainage in throat chest pressure fatigue from not sleeping   Neg covid         HPI:   Lashae presents today with 3 weeks of the above. Cough which is worse at night. Chest pressure, fatigue from not sleeping due to cough.   Has tried benadryl, vicks, etc. Chest pressure started first before the cough.   Using vitamin D and zinc, using cough drops. Grandkids have been healthy.   Home testing negative. Some SOB with walking, mostly due to congestion.       Current Outpatient Medications Ordered in Epic   Medication Sig Dispense Refill    methimazole (TAPAZOLE) 10 MG Tab Take 1 Tablet by mouth every day. 30 Tablet 6    Cholecalciferol (VITAMIN D) 2000 UNIT Tab Take 2,000 Units by mouth every day.      Calcium Carbonate-Vit D-Min (CALCIUM 1200 PO) Take  by mouth. Calcium 2000 mg daily      Multiple Vitamins-Minerals (PRESERVISION AREDS PO) Take  by mouth.       No current Epic-ordered facility-administered medications on file.         ROS:  Gen: no fevers/chills, no changes in weight  Eyes: no changes in vision  ENT: no sore throat, no hearing loss, no bloody nose  Pulm: no sob, no cough  CV: no chest pain, no palpitations  GI: no nausea/vomiting, no diarrhea  : no dysuria  MSk: no myalgias  Skin: no rash  Neuro: no headaches, no numbness/tingling  Heme/Lymph: no easy bruising      Objective:     Exam:  /86 (BP Location: Left arm, Patient Position: Sitting, BP Cuff Size: Adult)   Pulse 98   Temp 36.4 °C (97.6 °F)   Resp 12   Ht 1.575 m (5' 2\")   Wt 82.6 kg (182 lb)   SpO2 100%   BMI 33.29 kg/m²  Body mass index is 33.29 kg/m².    Gen: Alert and oriented, No apparent distress.  Neck: Neck is supple without lymphadenopathy.  Lungs: Normal effort, CTA bilaterally, no wheezes, rhonchi, or rales  CV: Regular rate and rhythm. No murmurs, rubs, or gallops.  Ext: No clubbing, cyanosis, edema.      Assessment & Plan:     67 " y.o. female with the following -     1. Cough  Discussed use of cough syrup at nighttime.  We did discuss side effects and not driving while on this medication.  She should take it when she is ready to go to bed.  - guaifenesin-codeine (TUSSI-ORGANIDIN NR) 100-10 MG/5ML syrup; Take 5 mL by mouth 3 times a day as needed for Cough for up to 5 days.  Dispense: 120 mL; Refill: 0    2. Subacute maxillary sinusitis  Discussed clinical exam.  No evidence of any pneumonia on exam.  We will go ahead and treat sinuses to see if we can get this to resolve and thus resolving the cough.  We discussed risks and benefits.  She will let us know if things or not improving.        No follow-ups on file.    Please note that this dictation was created using voice recognition software. I have made every reasonable attempt to correct obvious errors, but I expect that there are errors of grammar and possibly content that I did not discover before finalizing the note.

## 2022-09-27 ENCOUNTER — HOSPITAL ENCOUNTER (OUTPATIENT)
Dept: LAB | Facility: MEDICAL CENTER | Age: 67
End: 2022-09-27
Attending: INTERNAL MEDICINE
Payer: MEDICARE

## 2022-09-27 DIAGNOSIS — E05.90 SUBCLINICAL HYPERTHYROIDISM: ICD-10-CM

## 2022-09-27 LAB
ALBUMIN SERPL BCP-MCNC: 4.3 G/DL (ref 3.2–4.9)
ALBUMIN/GLOB SERPL: 1.7 G/DL
ALP SERPL-CCNC: 111 U/L (ref 30–99)
ALT SERPL-CCNC: 17 U/L (ref 2–50)
ANION GAP SERPL CALC-SCNC: 15 MMOL/L (ref 7–16)
AST SERPL-CCNC: 18 U/L (ref 12–45)
BASOPHILS # BLD AUTO: 0.6 % (ref 0–1.8)
BASOPHILS # BLD: 0.04 K/UL (ref 0–0.12)
BILIRUB SERPL-MCNC: 0.5 MG/DL (ref 0.1–1.5)
BUN SERPL-MCNC: 11 MG/DL (ref 8–22)
CALCIUM SERPL-MCNC: 9.1 MG/DL (ref 8.5–10.5)
CHLORIDE SERPL-SCNC: 101 MMOL/L (ref 96–112)
CO2 SERPL-SCNC: 22 MMOL/L (ref 20–33)
CREAT SERPL-MCNC: 0.57 MG/DL (ref 0.5–1.4)
EOSINOPHIL # BLD AUTO: 0.16 K/UL (ref 0–0.51)
EOSINOPHIL NFR BLD: 2.4 % (ref 0–6.9)
ERYTHROCYTE [DISTWIDTH] IN BLOOD BY AUTOMATED COUNT: 45.9 FL (ref 35.9–50)
GFR SERPLBLD CREATININE-BSD FMLA CKD-EPI: 100 ML/MIN/1.73 M 2
GLOBULIN SER CALC-MCNC: 2.5 G/DL (ref 1.9–3.5)
GLUCOSE SERPL-MCNC: 91 MG/DL (ref 65–99)
HCT VFR BLD AUTO: 44.9 % (ref 37–47)
HGB BLD-MCNC: 15.4 G/DL (ref 12–16)
IMM GRANULOCYTES # BLD AUTO: 0.01 K/UL (ref 0–0.11)
IMM GRANULOCYTES NFR BLD AUTO: 0.1 % (ref 0–0.9)
LYMPHOCYTES # BLD AUTO: 2.12 K/UL (ref 1–4.8)
LYMPHOCYTES NFR BLD: 31.3 % (ref 22–41)
MCH RBC QN AUTO: 32 PG (ref 27–33)
MCHC RBC AUTO-ENTMCNC: 34.3 G/DL (ref 33.6–35)
MCV RBC AUTO: 93.3 FL (ref 81.4–97.8)
MONOCYTES # BLD AUTO: 0.68 K/UL (ref 0–0.85)
MONOCYTES NFR BLD AUTO: 10 % (ref 0–13.4)
NEUTROPHILS # BLD AUTO: 3.76 K/UL (ref 2–7.15)
NEUTROPHILS NFR BLD: 55.6 % (ref 44–72)
NRBC # BLD AUTO: 0 K/UL
NRBC BLD-RTO: 0 /100 WBC
PLATELET # BLD AUTO: 212 K/UL (ref 164–446)
PMV BLD AUTO: 11.7 FL (ref 9–12.9)
POTASSIUM SERPL-SCNC: 3.8 MMOL/L (ref 3.6–5.5)
PROT SERPL-MCNC: 6.8 G/DL (ref 6–8.2)
RBC # BLD AUTO: 4.81 M/UL (ref 4.2–5.4)
SODIUM SERPL-SCNC: 138 MMOL/L (ref 135–145)
T3FREE SERPL-MCNC: 3.75 PG/ML (ref 2–4.4)
T4 FREE SERPL-MCNC: 1.56 NG/DL (ref 0.93–1.7)
TSH SERPL DL<=0.005 MIU/L-ACNC: 0.01 UIU/ML (ref 0.38–5.33)
WBC # BLD AUTO: 6.8 K/UL (ref 4.8–10.8)

## 2022-09-27 PROCEDURE — 84481 FREE ASSAY (FT-3): CPT

## 2022-09-27 PROCEDURE — 84443 ASSAY THYROID STIM HORMONE: CPT

## 2022-09-27 PROCEDURE — 36415 COLL VENOUS BLD VENIPUNCTURE: CPT

## 2022-09-27 PROCEDURE — 84439 ASSAY OF FREE THYROXINE: CPT

## 2022-09-27 PROCEDURE — 80053 COMPREHEN METABOLIC PANEL: CPT

## 2022-09-27 PROCEDURE — 85025 COMPLETE CBC W/AUTO DIFF WBC: CPT

## 2022-11-11 ENCOUNTER — PATIENT MESSAGE (OUTPATIENT)
Dept: HEALTH INFORMATION MANAGEMENT | Facility: OTHER | Age: 67
End: 2022-11-11

## 2023-02-06 ENCOUNTER — HOSPITAL ENCOUNTER (OUTPATIENT)
Facility: MEDICAL CENTER | Age: 68
End: 2023-02-06
Attending: FAMILY MEDICINE
Payer: MEDICARE

## 2023-02-06 ENCOUNTER — OFFICE VISIT (OUTPATIENT)
Dept: MEDICAL GROUP | Facility: LAB | Age: 68
End: 2023-02-06
Payer: MEDICARE

## 2023-02-06 VITALS
OXYGEN SATURATION: 96 % | TEMPERATURE: 98.4 F | RESPIRATION RATE: 12 BRPM | HEART RATE: 111 BPM | DIASTOLIC BLOOD PRESSURE: 78 MMHG | WEIGHT: 188 LBS | SYSTOLIC BLOOD PRESSURE: 138 MMHG | HEIGHT: 62 IN | BODY MASS INDEX: 34.6 KG/M2

## 2023-02-06 DIAGNOSIS — J01.00 ACUTE NON-RECURRENT MAXILLARY SINUSITIS: ICD-10-CM

## 2023-02-06 PROCEDURE — 99213 OFFICE O/P EST LOW 20 MIN: CPT | Performed by: FAMILY MEDICINE

## 2023-02-06 PROCEDURE — 0241U HCHG SARS-COV-2 COVID-19 NFCT DS RESP RNA 4 TRGT MIC: CPT

## 2023-02-06 RX ORDER — PREDNISONE 20 MG/1
40 TABLET ORAL DAILY
Qty: 10 TABLET | Refills: 0 | Status: SHIPPED | OUTPATIENT
Start: 2023-02-06 | End: 2023-02-11

## 2023-02-06 RX ORDER — AMOXICILLIN AND CLAVULANATE POTASSIUM 875; 125 MG/1; MG/1
1 TABLET, FILM COATED ORAL 2 TIMES DAILY
Qty: 20 TABLET | Refills: 0 | Status: SHIPPED | OUTPATIENT
Start: 2023-02-06 | End: 2023-02-16

## 2023-02-06 RX ORDER — PROMETHAZINE HYDROCHLORIDE, PHENYLEPHRINE HYDROCHLORIDE AND CODEINE PHOSPHATE 6.25; 5; 1 MG/5ML; MG/5ML; MG/5ML
5 SOLUTION ORAL EVERY 8 HOURS PRN
Qty: 280 ML | Refills: 0 | Status: SHIPPED | OUTPATIENT
Start: 2023-02-06 | End: 2023-02-07 | Stop reason: SDUPTHER

## 2023-02-06 ASSESSMENT — FIBROSIS 4 INDEX: FIB4 SCORE: 1.38

## 2023-02-06 NOTE — PROGRESS NOTES
"Subjective:     Chief Complaint   Patient presents with    Cough     Fever x last Wednesday getting worse         HPI:   Lashae presents today with cough, fever. Present for 5 days or so now. Started with a chest tightness. Now more ear pain, cough, congestion. Lots of postnasal drainage. Headache from coughing, sore throat, fevers.   Tired, not SOB. Not sleeping due to cough.     Benadryl, cough medicine, cough drops. No decongestants.   Grand Daughter with cough, but she is better now.                 Current Outpatient Medications Ordered in Epic   Medication Sig Dispense Refill    methimazole (TAPAZOLE) 10 MG Tab Take 1 Tablet by mouth every day. 30 Tablet 6    Cholecalciferol (VITAMIN D) 2000 UNIT Tab Take 2,000 Units by mouth every day.      Calcium Carbonate-Vit D-Min (CALCIUM 1200 PO) Take  by mouth. Calcium 2000 mg daily      Multiple Vitamins-Minerals (PRESERVISION AREDS PO) Take  by mouth.       No current Epic-ordered facility-administered medications on file.         ROS:  Gen: no fevers/chills, no changes in weight  Eyes: no changes in vision  ENT: no sore throat, no hearing loss, no bloody nose  Pulm: no sob, no cough  CV: no chest pain, no palpitations  GI: no nausea/vomiting, no diarrhea  : no dysuria  MSk: no myalgias  Skin: no rash  Neuro: no headaches, no numbness/tingling  Heme/Lymph: no easy bruising      Objective:     Exam:  /78 (BP Location: Right arm, Patient Position: Sitting, BP Cuff Size: Adult)   Pulse (!) 111   Temp 36.9 °C (98.4 °F)   Resp 12   Ht 1.575 m (5' 2\")   Wt 85.3 kg (188 lb)   SpO2 96%   BMI 34.39 kg/m²  Body mass index is 34.39 kg/m².    Gen: Alert and oriented, No apparent distress.  HEENT: NCAT, EOMI, TMs are both retracted with some fluid posteriorly.  Oropharynx is quite erythematous with some exudate drainage noted.  No tonsillar hypertrophy noted.  Neck: Neck is supple without lymphadenopathy.  Lungs: Normal effort, CTA bilaterally, no wheezes, " rhonchi, or rales.  Very congested and nasal sounding today.  CV: Regular rate and rhythm. No murmurs, rubs, or gallops.  Ext: No clubbing, cyanosis, edema.      Assessment & Plan:     68 y.o. female with the following -     1. Acute non-recurrent maxillary sinusitis  Discussed swabbing for RSV given that she is around her 2-year-old granddaughter a fair amount.  We will also start treating for sinus infection with some possible bronchitis component without Augmentin as well as cough medicine and a steroid burst.  Discussed risks and benefits of all medications as well as use of medication.  We will let her know what her lab is showing when it is resulted, and any further isolation or quarantine needs.  - amoxicillin-clavulanate (AUGMENTIN) 875-125 MG Tab; Take 1 Tablet by mouth 2 times a day for 10 days.  Dispense: 20 Tablet; Refill: 0  - predniSONE (DELTASONE) 20 MG Tab; Take 2 Tablets by mouth every day for 5 days.  Dispense: 10 Tablet; Refill: 0  - Promethazine-Phenyleph-Codeine (PHENERGAN VC CODEINE) 6.25-5-10 MG/5ML Syrup; Take 5 mL by mouth every 8 hours as needed (cough) for up to 7 days.  Dispense: 280 mL; Refill: 0  - Respiratory Syncytial Virus (RSV): Collect NP swab in VTM; Future            No follow-ups on file.    Please note that this dictation was created using voice recognition software. I have made every reasonable attempt to correct obvious errors, but I expect that there are errors of grammar and possibly content that I did not discover before finalizing the note.

## 2023-02-07 ENCOUNTER — TELEPHONE (OUTPATIENT)
Dept: MEDICAL GROUP | Facility: LAB | Age: 68
End: 2023-02-07
Payer: MEDICARE

## 2023-02-07 DIAGNOSIS — J01.00 ACUTE NON-RECURRENT MAXILLARY SINUSITIS: ICD-10-CM

## 2023-02-07 LAB
FLUAV RNA SPEC QL NAA+PROBE: NEGATIVE
FLUBV RNA SPEC QL NAA+PROBE: NEGATIVE
RSV RNA SPEC QL NAA+PROBE: NEGATIVE
SARS-COV-2 RNA RESP QL NAA+PROBE: NOTDETECTED
SPECIMEN SOURCE: NORMAL

## 2023-02-07 RX ORDER — PROMETHAZINE HYDROCHLORIDE, PHENYLEPHRINE HYDROCHLORIDE AND CODEINE PHOSPHATE 6.25; 5; 1 MG/5ML; MG/5ML; MG/5ML
5 SOLUTION ORAL EVERY 8 HOURS PRN
Qty: 120 ML | Refills: 0 | Status: SHIPPED | OUTPATIENT
Start: 2023-02-07 | End: 2023-02-15

## 2023-02-07 NOTE — TELEPHONE ENCOUNTER
Promethazine-Phenyleph-Codeine (PHENERGAN VC CODEINE) 6.25-5-10 MG/5ML Syrup 280 mL 0/0 2/6/2023 2/13/2023    Sig - Route: Take 5 mL by mouth every 8 hours as needed (cough) for up to 7 days. - Oral    Max 120 ML/8 DAY SUPPLY

## 2023-05-11 ENCOUNTER — OFFICE VISIT (OUTPATIENT)
Dept: MEDICAL GROUP | Facility: LAB | Age: 68
End: 2023-05-11
Payer: MEDICARE

## 2023-05-11 VITALS
DIASTOLIC BLOOD PRESSURE: 84 MMHG | RESPIRATION RATE: 12 BRPM | TEMPERATURE: 97.1 F | WEIGHT: 185 LBS | SYSTOLIC BLOOD PRESSURE: 126 MMHG | HEIGHT: 62 IN | OXYGEN SATURATION: 97 % | HEART RATE: 98 BPM | BODY MASS INDEX: 34.04 KG/M2

## 2023-05-11 DIAGNOSIS — Z13.1 SCREENING FOR DIABETES MELLITUS: ICD-10-CM

## 2023-05-11 DIAGNOSIS — C50.919 MALIGNANT NEOPLASM OF BREAST IN FEMALE, ESTROGEN RECEPTOR POSITIVE, UNSPECIFIED LATERALITY, UNSPECIFIED SITE OF BREAST (HCC): ICD-10-CM

## 2023-05-11 DIAGNOSIS — E78.5 DYSLIPIDEMIA, GOAL LDL BELOW 130: ICD-10-CM

## 2023-05-11 DIAGNOSIS — R73.09 ELEVATED GLUCOSE: ICD-10-CM

## 2023-05-11 DIAGNOSIS — E55.9 VITAMIN D DEFICIENCY: ICD-10-CM

## 2023-05-11 DIAGNOSIS — R14.0 BLOATING: ICD-10-CM

## 2023-05-11 DIAGNOSIS — Z17.0 MALIGNANT NEOPLASM OF BREAST IN FEMALE, ESTROGEN RECEPTOR POSITIVE, UNSPECIFIED LATERALITY, UNSPECIFIED SITE OF BREAST (HCC): ICD-10-CM

## 2023-05-11 DIAGNOSIS — E06.3 HASHIMOTO'S THYROIDITIS: ICD-10-CM

## 2023-05-11 PROBLEM — I34.1 MITRAL VALVE PROLAPSE: Status: ACTIVE | Noted: 2023-05-01

## 2023-05-11 PROBLEM — E78.9 DISORDER OF LIPID METABOLISM: Status: ACTIVE | Noted: 2023-05-11

## 2023-05-11 PROCEDURE — 99214 OFFICE O/P EST MOD 30 MIN: CPT | Performed by: FAMILY MEDICINE

## 2023-05-11 ASSESSMENT — FIBROSIS 4 INDEX: FIB4 SCORE: 1.38

## 2023-05-11 ASSESSMENT — PATIENT HEALTH QUESTIONNAIRE - PHQ9: CLINICAL INTERPRETATION OF PHQ2 SCORE: 0

## 2023-05-11 NOTE — PROGRESS NOTES
"Subjective:     Chief Complaint   Patient presents with    GI Problem         HPI:   Lashae presents today with persistent bloating, abdominal discomfort. Trying acupuncture and on a low inflammatory diet.   H/o cholecystectomy.   Diet: Lots of rice, chicken and turkey, steamed vegetables.   Helped a bit. Diarrhea less.   Still with bloating. Even without eating in the AM she will wake bloated.       Surgery for malignant breast mass with Dr. Goldsmith in a couple weeks.       Current Outpatient Medications Ordered in Epic   Medication Sig Dispense Refill    Cholecalciferol (VITAMIN D) 2000 UNIT Tab Take 2,000 Units by mouth every day.      Calcium Carbonate-Vit D-Min (CALCIUM 1200 PO) Take  by mouth. Calcium 2000 mg daily       No current [facility]-ordered facility-administered medications on file.         ROS:  Gen: no fevers/chills, no changes in weight  Eyes: no changes in vision  ENT: no sore throat, no hearing loss, no bloody nose  Pulm: no sob, no cough  CV: no chest pain, no palpitations  GI: no nausea/vomiting, no diarrhea  : no dysuria  MSk: no myalgias  Skin: no rash  Neuro: no headaches, no numbness/tingling  Heme/Lymph: no easy bruising      Objective:     Exam:  /84   Pulse 98   Temp 36.2 °C (97.1 °F)   Resp 12   Ht 1.575 m (5' 2\")   Wt 83.9 kg (185 lb)   SpO2 97%   BMI 33.84 kg/m²  Body mass index is 33.84 kg/m².  Gen: AAOx3, NAD, well appearing  HEENT: NCAT, EOMI, Nares patent, Mucosa moist  Resp: Normal chest wall rise and fall, not SOB, no tachypnea  Skin: no rash or abnormality of visible skin.   Psych: normal speech, not slurred, good insight, affect full  MSK: Moves all four limbs equally and normally, gait normal      Assessment & Plan:     68 y.o. female with the following -     1. Dyslipidemia, goal LDL below 130    - Lipid Profile; Future    2. Bloating  Discussed the different things that can cause bloating in the GI tract.  We did discuss trying to reduce amount of rice that she is " having and sticking with lean proteins possible even moderating the vegetables and fiber.  She does have a history of cholecystectomy so this could still be related.  We will go ahead and get some lab orders put in that she will do mid July which should give her enough time after surgery to avoid any false positives related to that.  Discussed her last colonoscopy in 2018 which was completely clear so I do not think there is anything necessarily malignant going on that is causing this.  We did discuss the difficulty being postmenopausal female and also having some GI issues and food intolerances.  She will continue to try to be as active as possible as well.  - CBC WITH DIFFERENTIAL; Future  - Comp Metabolic Panel; Future  - FECAL LACTOFERRIN QUALITATIVE  - CELIAC DISEASE AB PANEL; Future  - Sed Rate; Future  - CRP QUANTITIVE (NON-CARDIAC); Future    3. Hashimoto's thyroiditis    - TSH WITH REFLEX TO FT4; Future    4. Screening for diabetes mellitus    - HEMOGLOBIN A1C; Future  - INSULIN FASTING; Future    5. Vitamin D deficiency    - VITAMIN D,25 HYDROXY (DEFICIENCY); Future    6. Elevated glucose    - HEMOGLOBIN A1C; Future    7. Malignant neoplasm of breast in female, estrogen receptor positive, unspecified laterality, unspecified site of breast (HCC)  Has surgery in the next couple weeks with Dr. Goldsmith through [facility].  Left breast.  Invasive ductal carcinoma grade 1.            Return in about 2 months (around 7/18/2023) for f/u labs.    Please note that this dictation was created using voice recognition software. I have made every reasonable attempt to correct obvious errors, but I expect that there are errors of grammar and possibly content that I did not discover before finalizing the note.

## 2023-05-11 NOTE — LETTER
[facility]  Krista Narayan M.D.  [id number]  [place] [address]  Fax: [phone]   Authorization for Release/Disclosure of   Protected Health Information   Name: LASHAE BEJARANO : 1955 SSN: [id number]   Address: [address] [place] [address]  [place] [address] Phone:    [phone] (home)    I authorize the entity listed below to release/disclose the PHI below to:   [facility]/Krista Narayan M.D. and Krista Narayan M.D.   Provider or Entity Name:  [facility]   Address   City, State, [facility]   Phone:      Fax:  [phone]   Reason for request: continuity of care   Information to be released:    [  ] LAST COLONOSCOPY,  including any PATH REPORT and follow-up  [  ] LAST FIT/COLOGUARD RESULT [  ] LAST DEXA  [  ] LAST MAMMOGRAM  [  ] LAST PAP  [  ] LAST LABS [  ] RETINA EXAM REPORT  [  ] IMMUNIZATION RECORDS  [ xx ] Release all info      [  ] Check here and initial the line next to each item to release ALL health information INCLUDING  _____ Care and treatment for drug and / or alcohol abuse  _____ HIV testing, infection status, or AIDS  _____ Genetic Testing    DATES OF SERVICE OR TIME PERIOD TO BE DISCLOSED: _____________  I understand and acknowledge that:  * This Authorization may be revoked at any time by you in writing, except if your health information has already been used or disclosed.  * Your health information that will be used or disclosed as a result of you signing this authorization could be re-disclosed by the recipient. If this occurs, your re-disclosed health information may no longer be protected by State or Federal laws.  * You may refuse to sign this Authorization. Your refusal will not affect your ability to obtain treatment.  * This Authorization becomes effective upon signing and will  on (date) __________.      If no date is indicated, this Authorization will  one (1) year from the signature date.    Name: Lashae Wilson  Kavya  Signature: Date:   5/11/2023

## 2023-06-05 ENCOUNTER — APPOINTMENT (OUTPATIENT)
Dept: ADMISSIONS | Facility: MEDICAL CENTER | Age: 68
End: 2023-06-05
Attending: SURGERY
Payer: MEDICARE

## 2023-06-07 ENCOUNTER — PRE-ADMISSION TESTING (OUTPATIENT)
Dept: ADMISSIONS | Facility: MEDICAL CENTER | Age: 68
End: 2023-06-07
Attending: SURGERY
Payer: MEDICARE

## 2023-06-07 RX ORDER — ACETAMINOPHEN 500 MG
500-1000 TABLET ORAL EVERY 6 HOURS PRN
COMMUNITY
End: 2023-07-17

## 2023-06-08 ENCOUNTER — PRE-ADMISSION TESTING (OUTPATIENT)
Dept: ADMISSIONS | Facility: MEDICAL CENTER | Age: 68
End: 2023-06-08
Attending: SURGERY
Payer: MEDICARE

## 2023-06-08 DIAGNOSIS — Z01.810 PRE-OPERATIVE CARDIOVASCULAR EXAMINATION: ICD-10-CM

## 2023-06-08 LAB — EKG IMPRESSION: NORMAL

## 2023-06-08 PROCEDURE — 93005 ELECTROCARDIOGRAM TRACING: CPT

## 2023-06-08 PROCEDURE — 93010 ELECTROCARDIOGRAM REPORT: CPT | Performed by: INTERNAL MEDICINE

## 2023-06-09 ENCOUNTER — HOSPITAL ENCOUNTER (OUTPATIENT)
Facility: MEDICAL CENTER | Age: 68
End: 2023-06-09
Attending: SURGERY | Admitting: SURGERY
Payer: MEDICARE

## 2023-06-09 ENCOUNTER — ANESTHESIA (OUTPATIENT)
Dept: SURGERY | Facility: MEDICAL CENTER | Age: 68
End: 2023-06-09
Payer: MEDICARE

## 2023-06-09 ENCOUNTER — APPOINTMENT (OUTPATIENT)
Dept: RADIOLOGY | Facility: MEDICAL CENTER | Age: 68
End: 2023-06-09
Attending: SURGERY
Payer: MEDICARE

## 2023-06-09 ENCOUNTER — PHARMACY VISIT (OUTPATIENT)
Dept: PHARMACY | Facility: MEDICAL CENTER | Age: 68
End: 2023-06-09
Payer: COMMERCIAL

## 2023-06-09 ENCOUNTER — ANESTHESIA EVENT (OUTPATIENT)
Dept: SURGERY | Facility: MEDICAL CENTER | Age: 68
End: 2023-06-09
Payer: MEDICARE

## 2023-06-09 VITALS
WEIGHT: 185.19 LBS | SYSTOLIC BLOOD PRESSURE: 120 MMHG | BODY MASS INDEX: 34.08 KG/M2 | OXYGEN SATURATION: 95 % | RESPIRATION RATE: 18 BRPM | HEIGHT: 62 IN | DIASTOLIC BLOOD PRESSURE: 63 MMHG | HEART RATE: 79 BPM | TEMPERATURE: 97.6 F

## 2023-06-09 DIAGNOSIS — C50.412 MALIGNANT NEOPLASM OF UPPER-OUTER QUADRANT OF LEFT FEMALE BREAST, UNSPECIFIED ESTROGEN RECEPTOR STATUS (HCC): ICD-10-CM

## 2023-06-09 DIAGNOSIS — G89.18 POSTOPERATIVE PAIN: ICD-10-CM

## 2023-06-09 LAB — PATHOLOGY CONSULT NOTE: NORMAL

## 2023-06-09 PROCEDURE — 700111 HCHG RX REV CODE 636 W/ 250 OVERRIDE (IP): Performed by: ANESTHESIOLOGY

## 2023-06-09 PROCEDURE — 160041 HCHG SURGERY MINUTES - EA ADDL 1 MIN LEVEL 4: Performed by: SURGERY

## 2023-06-09 PROCEDURE — 160002 HCHG RECOVERY MINUTES (STAT): Performed by: SURGERY

## 2023-06-09 PROCEDURE — 700102 HCHG RX REV CODE 250 W/ 637 OVERRIDE(OP): Performed by: ANESTHESIOLOGY

## 2023-06-09 PROCEDURE — 160047 HCHG PACU  - EA ADDL 30 MINS PHASE II: Performed by: SURGERY

## 2023-06-09 PROCEDURE — A9270 NON-COVERED ITEM OR SERVICE: HCPCS | Performed by: ANESTHESIOLOGY

## 2023-06-09 PROCEDURE — 38792 RA TRACER ID OF SENTINL NODE: CPT | Mod: LT

## 2023-06-09 PROCEDURE — 88307 TISSUE EXAM BY PATHOLOGIST: CPT | Mod: 59

## 2023-06-09 PROCEDURE — 160009 HCHG ANES TIME/MIN: Performed by: SURGERY

## 2023-06-09 PROCEDURE — 700111 HCHG RX REV CODE 636 W/ 250 OVERRIDE (IP): Mod: JG | Performed by: SURGERY

## 2023-06-09 PROCEDURE — 160025 RECOVERY II MINUTES (STATS): Performed by: SURGERY

## 2023-06-09 PROCEDURE — 700101 HCHG RX REV CODE 250: Performed by: ANESTHESIOLOGY

## 2023-06-09 PROCEDURE — 88341 IMHCHEM/IMCYTCHM EA ADD ANTB: CPT

## 2023-06-09 PROCEDURE — RXMED WILLOW AMBULATORY MEDICATION CHARGE: Performed by: SURGERY

## 2023-06-09 PROCEDURE — 160048 HCHG OR STATISTICAL LEVEL 1-5: Performed by: SURGERY

## 2023-06-09 PROCEDURE — 160035 HCHG PACU - 1ST 60 MINS PHASE I: Performed by: SURGERY

## 2023-06-09 PROCEDURE — 01610 ANES ALL PX NRV MUSC SHO&AX: CPT | Performed by: ANESTHESIOLOGY

## 2023-06-09 PROCEDURE — 160029 HCHG SURGERY MINUTES - 1ST 30 MINS LEVEL 4: Performed by: SURGERY

## 2023-06-09 PROCEDURE — 160046 HCHG PACU - 1ST 60 MINS PHASE II: Performed by: SURGERY

## 2023-06-09 PROCEDURE — 700105 HCHG RX REV CODE 258: Performed by: SURGERY

## 2023-06-09 PROCEDURE — 88342 IMHCHEM/IMCYTCHM 1ST ANTB: CPT

## 2023-06-09 PROCEDURE — 700101 HCHG RX REV CODE 250: Performed by: SURGERY

## 2023-06-09 RX ORDER — DEXAMETHASONE SODIUM PHOSPHATE 4 MG/ML
INJECTION, SOLUTION INTRA-ARTICULAR; INTRALESIONAL; INTRAMUSCULAR; INTRAVENOUS; SOFT TISSUE PRN
Status: DISCONTINUED | OUTPATIENT
Start: 2023-06-09 | End: 2023-06-09 | Stop reason: SURG

## 2023-06-09 RX ORDER — HYDROMORPHONE HYDROCHLORIDE 1 MG/ML
0.1 INJECTION, SOLUTION INTRAMUSCULAR; INTRAVENOUS; SUBCUTANEOUS
Status: DISCONTINUED | OUTPATIENT
Start: 2023-06-09 | End: 2023-06-09 | Stop reason: HOSPADM

## 2023-06-09 RX ORDER — OXYCODONE HCL 5 MG/5 ML
10 SOLUTION, ORAL ORAL
Status: COMPLETED | OUTPATIENT
Start: 2023-06-09 | End: 2023-06-09

## 2023-06-09 RX ORDER — ONDANSETRON 2 MG/ML
INJECTION INTRAMUSCULAR; INTRAVENOUS PRN
Status: DISCONTINUED | OUTPATIENT
Start: 2023-06-09 | End: 2023-06-09 | Stop reason: SURG

## 2023-06-09 RX ORDER — LIDOCAINE HYDROCHLORIDE 20 MG/ML
INJECTION, SOLUTION EPIDURAL; INFILTRATION; INTRACAUDAL; PERINEURAL PRN
Status: DISCONTINUED | OUTPATIENT
Start: 2023-06-09 | End: 2023-06-09 | Stop reason: SURG

## 2023-06-09 RX ORDER — HYDRALAZINE HYDROCHLORIDE 20 MG/ML
5 INJECTION INTRAMUSCULAR; INTRAVENOUS
Status: DISCONTINUED | OUTPATIENT
Start: 2023-06-09 | End: 2023-06-09 | Stop reason: HOSPADM

## 2023-06-09 RX ORDER — CEFAZOLIN SODIUM 1 G/3ML
INJECTION, POWDER, FOR SOLUTION INTRAMUSCULAR; INTRAVENOUS PRN
Status: DISCONTINUED | OUTPATIENT
Start: 2023-06-09 | End: 2023-06-09 | Stop reason: SURG

## 2023-06-09 RX ORDER — ONDANSETRON 2 MG/ML
4 INJECTION INTRAMUSCULAR; INTRAVENOUS
Status: DISCONTINUED | OUTPATIENT
Start: 2023-06-09 | End: 2023-06-09 | Stop reason: HOSPADM

## 2023-06-09 RX ORDER — MIDAZOLAM HYDROCHLORIDE 1 MG/ML
1 INJECTION INTRAMUSCULAR; INTRAVENOUS
Status: DISCONTINUED | OUTPATIENT
Start: 2023-06-09 | End: 2023-06-09 | Stop reason: HOSPADM

## 2023-06-09 RX ORDER — HALOPERIDOL 5 MG/ML
1 INJECTION INTRAMUSCULAR
Status: DISCONTINUED | OUTPATIENT
Start: 2023-06-09 | End: 2023-06-09 | Stop reason: HOSPADM

## 2023-06-09 RX ORDER — DIPHENHYDRAMINE HYDROCHLORIDE 50 MG/ML
12.5 INJECTION INTRAMUSCULAR; INTRAVENOUS
Status: DISCONTINUED | OUTPATIENT
Start: 2023-06-09 | End: 2023-06-09 | Stop reason: HOSPADM

## 2023-06-09 RX ORDER — HYDROMORPHONE HYDROCHLORIDE 1 MG/ML
0.2 INJECTION, SOLUTION INTRAMUSCULAR; INTRAVENOUS; SUBCUTANEOUS
Status: DISCONTINUED | OUTPATIENT
Start: 2023-06-09 | End: 2023-06-09 | Stop reason: HOSPADM

## 2023-06-09 RX ORDER — OXYCODONE HCL 5 MG/5 ML
5 SOLUTION, ORAL ORAL
Status: COMPLETED | OUTPATIENT
Start: 2023-06-09 | End: 2023-06-09

## 2023-06-09 RX ORDER — METHYLENE BLUE 10 MG/ML
INJECTION INTRAVENOUS
Status: DISCONTINUED | OUTPATIENT
Start: 2023-06-09 | End: 2023-06-09 | Stop reason: HOSPADM

## 2023-06-09 RX ORDER — ROCURONIUM BROMIDE 10 MG/ML
INJECTION, SOLUTION INTRAVENOUS PRN
Status: DISCONTINUED | OUTPATIENT
Start: 2023-06-09 | End: 2023-06-09 | Stop reason: SURG

## 2023-06-09 RX ORDER — BUPIVACAINE HYDROCHLORIDE AND EPINEPHRINE 5; 5 MG/ML; UG/ML
INJECTION, SOLUTION PERINEURAL
Status: DISCONTINUED | OUTPATIENT
Start: 2023-06-09 | End: 2023-06-09 | Stop reason: HOSPADM

## 2023-06-09 RX ORDER — SODIUM CHLORIDE, SODIUM LACTATE, POTASSIUM CHLORIDE, CALCIUM CHLORIDE 600; 310; 30; 20 MG/100ML; MG/100ML; MG/100ML; MG/100ML
INJECTION, SOLUTION INTRAVENOUS CONTINUOUS
Status: DISCONTINUED | OUTPATIENT
Start: 2023-06-09 | End: 2023-06-09 | Stop reason: HOSPADM

## 2023-06-09 RX ORDER — GABAPENTIN 300 MG/1
300 CAPSULE ORAL ONCE
Status: COMPLETED | OUTPATIENT
Start: 2023-06-09 | End: 2023-06-09

## 2023-06-09 RX ORDER — ACETAMINOPHEN 500 MG
1000 TABLET ORAL ONCE
Status: COMPLETED | OUTPATIENT
Start: 2023-06-09 | End: 2023-06-09

## 2023-06-09 RX ORDER — HYDROCODONE BITARTRATE AND ACETAMINOPHEN 5; 325 MG/1; MG/1
1-2 TABLET ORAL EVERY 6 HOURS PRN
Qty: 30 TABLET | Refills: 0 | Status: SHIPPED | OUTPATIENT
Start: 2023-06-09 | End: 2023-06-16

## 2023-06-09 RX ORDER — MEPERIDINE HYDROCHLORIDE 25 MG/ML
12.5 INJECTION INTRAMUSCULAR; INTRAVENOUS; SUBCUTANEOUS
Status: DISCONTINUED | OUTPATIENT
Start: 2023-06-09 | End: 2023-06-09 | Stop reason: HOSPADM

## 2023-06-09 RX ORDER — HYDROMORPHONE HYDROCHLORIDE 1 MG/ML
0.4 INJECTION, SOLUTION INTRAMUSCULAR; INTRAVENOUS; SUBCUTANEOUS
Status: DISCONTINUED | OUTPATIENT
Start: 2023-06-09 | End: 2023-06-09 | Stop reason: HOSPADM

## 2023-06-09 RX ORDER — EPHEDRINE SULFATE 50 MG/ML
5 INJECTION, SOLUTION INTRAVENOUS
Status: DISCONTINUED | OUTPATIENT
Start: 2023-06-09 | End: 2023-06-09 | Stop reason: HOSPADM

## 2023-06-09 RX ORDER — LIDOCAINE AND PRILOCAINE 25; 25 MG/G; MG/G
CREAM TOPICAL ONCE
Status: COMPLETED | OUTPATIENT
Start: 2023-06-09 | End: 2023-06-09

## 2023-06-09 RX ORDER — MIDAZOLAM HYDROCHLORIDE 1 MG/ML
INJECTION INTRAMUSCULAR; INTRAVENOUS PRN
Status: DISCONTINUED | OUTPATIENT
Start: 2023-06-09 | End: 2023-06-09 | Stop reason: SURG

## 2023-06-09 RX ORDER — KETOROLAC TROMETHAMINE 30 MG/ML
INJECTION, SOLUTION INTRAMUSCULAR; INTRAVENOUS PRN
Status: DISCONTINUED | OUTPATIENT
Start: 2023-06-09 | End: 2023-06-09 | Stop reason: SURG

## 2023-06-09 RX ADMIN — HYDROMORPHONE HYDROCHLORIDE 0.2 MG: 1 INJECTION, SOLUTION INTRAMUSCULAR; INTRAVENOUS; SUBCUTANEOUS at 12:43

## 2023-06-09 RX ADMIN — ROCURONIUM BROMIDE 50 MG: 50 INJECTION, SOLUTION INTRAVENOUS at 09:58

## 2023-06-09 RX ADMIN — MIDAZOLAM 2 MG: 1 INJECTION, SOLUTION INTRAMUSCULAR; INTRAVENOUS at 09:48

## 2023-06-09 RX ADMIN — CEFAZOLIN 2 G: 1 INJECTION, POWDER, FOR SOLUTION INTRAMUSCULAR; INTRAVENOUS at 09:58

## 2023-06-09 RX ADMIN — DEXAMETHASONE SODIUM PHOSPHATE 8 MG: 4 INJECTION INTRA-ARTICULAR; INTRALESIONAL; INTRAMUSCULAR; INTRAVENOUS; SOFT TISSUE at 09:58

## 2023-06-09 RX ADMIN — KETOROLAC TROMETHAMINE 30 MG: 30 INJECTION, SOLUTION INTRAMUSCULAR; INTRAVENOUS at 10:36

## 2023-06-09 RX ADMIN — FENTANYL CITRATE 50 MCG: 50 INJECTION, SOLUTION INTRAMUSCULAR; INTRAVENOUS at 09:58

## 2023-06-09 RX ADMIN — PROPOFOL 200 MG: 10 INJECTION, EMULSION INTRAVENOUS at 09:58

## 2023-06-09 RX ADMIN — GABAPENTIN 300 MG: 300 CAPSULE ORAL at 09:08

## 2023-06-09 RX ADMIN — LIDOCAINE HYDROCHLORIDE 100 MG: 20 INJECTION, SOLUTION EPIDURAL; INFILTRATION; INTRACAUDAL at 09:58

## 2023-06-09 RX ADMIN — FENTANYL CITRATE 25 MCG: 50 INJECTION, SOLUTION INTRAMUSCULAR; INTRAVENOUS at 11:01

## 2023-06-09 RX ADMIN — LIDOCAINE AND PRILOCAINE 2.5 %: 25; 25 CREAM TOPICAL at 08:17

## 2023-06-09 RX ADMIN — ONDANSETRON 4 MG: 2 INJECTION INTRAMUSCULAR; INTRAVENOUS at 10:36

## 2023-06-09 RX ADMIN — FENTANYL CITRATE 50 MCG: 50 INJECTION, SOLUTION INTRAMUSCULAR; INTRAVENOUS at 10:16

## 2023-06-09 RX ADMIN — FENTANYL CITRATE 50 MCG: 50 INJECTION, SOLUTION INTRAMUSCULAR; INTRAVENOUS at 11:21

## 2023-06-09 RX ADMIN — ACETAMINOPHEN 1000 MG: 500 TABLET, FILM COATED ORAL at 09:08

## 2023-06-09 RX ADMIN — FENTANYL CITRATE 25 MCG: 50 INJECTION, SOLUTION INTRAMUSCULAR; INTRAVENOUS at 11:34

## 2023-06-09 RX ADMIN — FENTANYL CITRATE 50 MCG: 50 INJECTION, SOLUTION INTRAMUSCULAR; INTRAVENOUS at 10:09

## 2023-06-09 RX ADMIN — SUGAMMADEX 200 MG: 100 INJECTION, SOLUTION INTRAVENOUS at 10:36

## 2023-06-09 RX ADMIN — OXYCODONE HYDROCHLORIDE 5 MG: 5 SOLUTION ORAL at 11:01

## 2023-06-09 RX ADMIN — OXYCODONE HYDROCHLORIDE 5 MG: 5 SOLUTION ORAL at 11:17

## 2023-06-09 RX ADMIN — SODIUM CHLORIDE, POTASSIUM CHLORIDE, SODIUM LACTATE AND CALCIUM CHLORIDE: 600; 310; 30; 20 INJECTION, SOLUTION INTRAVENOUS at 09:50

## 2023-06-09 ASSESSMENT — PAIN DESCRIPTION - PAIN TYPE
TYPE: SURGICAL PAIN

## 2023-06-09 ASSESSMENT — PAIN SCALES - GENERAL: PAIN_LEVEL: 3

## 2023-06-09 ASSESSMENT — FIBROSIS 4 INDEX: FIB4 SCORE: 1.38

## 2023-06-09 NOTE — DISCHARGE INSTRUCTIONS
If any questions arise, call your provider.  Dr. Goldsmith's telephone number: [phone]    If your provider is not available, please feel free to call the Surgical Center at [phone].    MEDICATIONS: Resume taking daily medication.  Take prescribed pain medication with food.  If no medication is prescribed, you may take non-aspirin pain medication if needed.  PAIN MEDICATION CAN BE VERY CONSTIPATING.  Take a stool softener or laxative such as senokot, pericolace, or milk of magnesia if needed.    Last pain medication given at 11:15 am (oral oxycodone given). Ok to take your prescription pain medication if needed after 3:15 pm.    What to Expect Post Anesthesia    Rest and take it easy for the first 24 hours.  A responsible adult is recommended to remain with you during that time.  It is normal to feel sleepy.  We encourage you to not do anything that requires balance, judgment or coordination.    FOR 24 HOURS DO NOT:  Drive, operate machinery or run household appliances.  Drink beer or alcoholic beverages.  Make important decisions or sign legal documents.    To avoid nausea, slowly advance diet as tolerated, avoiding spicy or greasy foods for the first day.  Add more substantial food to your diet according to your provider's instructions.  INCREASE FLUIDS AND FIBER TO AVOID CONSTIPATION.    MILD FLU-LIKE SYMPTOMS ARE NORMAL.  YOU MAY EXPERIENCE GENERALIZED MUSCLE ACHES, THROAT IRRITATION, HEADACHE AND/OR SOME NAUSEA.    Discharge Instructions for Lumpectomy and [place] Lymph Node Biospy:     On the day of surgery we will be removing the lump of your breast cancer (lumpectomy) and through a separate incision under your arm we will be taking out the 2-3 lymph nodes that drain your breast (sentinel lymph node dissection).     Wound Care  You will have 2 incisions: 1) on your breast (lumpectomy) and 2) under your arm (sentinel lymph node biopsy).  All of your stitches are buried under the skin and dissolveable.  There are no sutures to remove.   You can shower the day after your surgery and get your wound wet (it will be sealed by the waterproof dressings)  You may have some bruising after surgery. This is normal.  There may be a small amount of drainage from your wounds, this is usually normal and nothing to worry about. Place a dry gauze or bandage (available at any drug store) on the wound to absorb any drainage.  You can remove the dressing 2 days after surgery.      For Pain  Wear your bra as much as possible including to bed. The less your breast moves the less it will hurt.  You may take Tylenol or Advil every 4-6 hours as directed on the bottle.  You will be given a prescription for more severe pain. You can use it as needed.     Work  If you would like, you may return to work the day after your biopsy.  If you need a work excuse for the day of surgery or for any days thereafter, please let us know.     When to call the doctor  If you have severe, uncontrolled pain at the biopsy site.  If you run at fever of 100.5?F or higher within a few days of the biopsy.  If you have a large amount of drainage that is soaking the bandage more than once a day.  If the area around your wound becomes red/inflamed.  Make sure you schedule and attend all follow-up visits with your doctor. You should have a follow up appointment in about a week after surgery.     If you have any additional questions, please do not hesitate to call the office and speak to either myself or the physician on call.     Office address:  [address] [place] [address] [address]  [place] [address]           Jillian Goldsmith MD  [facility]  [phone]

## 2023-06-09 NOTE — H&P
"Surgical History and Physical    Date: 2023    PCP: Krista Narayan M.D.  Attending Physician: Jillian Goldsmith M.D. [facility]    CC: \"here for surgery\"    HPI: This is a 68 y.o. female who is presenting with left breast cancer, here for partial mastectomy with sentinel lymph node biopsy.     Past Medical History:   Diagnosis Date    Allergy     Arrhythmia     Bundle branch block, pt told no need to followup with cardiologist    Arthritis of knee, right     ?    Asthma     as child    Blood transfusion without reported diagnosis     tonsillectomy    Bruxism     Cancer (HCC) 2023    breast cancer    Chickenpox     Dyslipidemia, goal LDL below 130     Elevated fasting glucose     GERD (gastroesophageal reflux disease)     Head ache     TMJ    History of asthma     History of hyperthyroidism     hachimoto's thyroiditis    Mitral valve prolapse     Sleep apnea     Snoring     Thyroid disease        Past Surgical History:   Procedure Laterality Date    COLONOSCOPY  2018    EGD ESOPHAGUS WITH ENDOSCOPIC US      BRAD BY LAPAROSCOPY      TONSILLECTOMY         Current Facility-Administered Medications   Medication Dose Route Frequency Provider Last Rate Last Admin    lidocaine (XYLOCAINE) 1 % injection 0.5 mL  0.5 mL Intradermal Once PRN Jillian Goldsmith M.D.        lactated ringers infusion   Intravenous Continuous Jillian Goldsmith M.D.           Social History     Socioeconomic History    Marital status:      Spouse name: Not on file    Number of children: Not on file    Years of education: Not on file    Highest education level: Some college, no degree   Occupational History    Not on file   Tobacco Use    Smoking status: Former     Packs/day: 1.00     Years: 5.00     Pack years: 5.00     Types: Cigarettes     Start date: 1974     Quit date: 1980     Years since quittin.9    Smokeless tobacco: Never   Vaping Use    Vaping Use: Never used   Substance and Sexual " Activity    Alcohol use: Yes     Alcohol/week: 1.2 oz     Types: 2 Glasses of wine per week     Comment: occ    Drug use: No    Sexual activity: Yes     Partners: Male     Birth control/protection: Post-Menopausal   Other Topics Concern    Not on file   Social History Narrative    Not on file     Social Determinants of Health     Financial Resource Strain: Low Risk  (7/16/2021)    Overall Financial Resource Strain (CARDIA)     Difficulty of Paying Living Expenses: Not very hard   Food Insecurity: No Food Insecurity (7/16/2021)    Hunger Vital Sign     Worried About Running Out of Food in the Last Year: Never true     Ran Out of Food in the Last Year: Never true   Transportation Needs: No Transportation Needs (7/16/2021)    PRAPARE - Transportation     Lack of Transportation (Medical): No     Lack of Transportation (Non-Medical): No   Physical Activity: Sufficiently Active (7/16/2021)    Exercise Vital Sign     Days of Exercise per Week: 7 days     Minutes of Exercise per Session: 60 min   Stress: No Stress Concern Present (7/16/2021)    [employer] [place] [employer] - Occupational Stress Questionnaire     Feeling of Stress : Only a little   Social Connections: Unknown (7/16/2021)    Social Connection and Isolation Panel [NHANES]     Frequency of Communication with Friends and Family: Once a week     Frequency of Social Gatherings with Friends and Family: Once a week     Attends Judaism Services: Not on file     Active Member of Clubs or Organizations: Not on file     Attends Club or Organization Meetings: Not on file     Marital Status:    Intimate Partner Violence: Not on file   Housing Stability: Low Risk  (7/16/2021)    Housing Stability Vital Sign     Unable to Pay for Housing in the Last Year: No     Number of Places Lived in the Last Year: 2     Unstable Housing in the Last Year: No       Family History   Problem Relation Age of Onset    Hypertension Mother     Diabetes Mother         typr  "II    Hyperlipidemia Mother     Arthritis Mother     Cancer Father         throat    Arthritis Father         gout    Diabetes Brother        Allergies:  Macrobid [nitrofurantoin]    Review of Systems:  Constitutional: Negative for fever, chills, weight loss, malaise/fatigue and diaphoresis.   HENT: Negative for hearing loss, ear pain, nosebleeds, congestion, sore throat, neck pain, tinnitus and ear discharge.    Eyes: Negative for blurred vision, double vision, photophobia, pain, discharge and redness.   Respiratory: Negative for cough, hemoptysis, sputum production, shortness of breath, wheezing and stridor.    Cardiovascular: Negative for chest pain, palpitations, orthopnea, claudication, leg swelling and PND.   Gastrointestinal: Negative for heartburn, nausea, vomiting, abdominal pain, diarrhea, constipation, blood in stool and melena.   Genitourinary: Negative for dysuria, urgency, frequency, hematuria and flank pain.   Musculoskeletal: Negative for myalgias, back pain, joint pain and falls.   Skin: Negative for itching and rash.  Neurological: Negative for dizziness, tingling, tremors, sensory change, speech change, focal weakness, seizures, loss of consciousness, weakness and headaches.   Endo/Heme/Allergies: Negative for environmental allergies and polydipsia. Does not bruise/bleed easily.   Psychiatric/Behavioral: Negative for depression, suicidal ideas, hallucinations, memory loss and substance abuse. The patient is not nervous/anxious and does not have insomnia.    Physical Exam:  BP (!) 145/81   Pulse 93   Temp 36.2 °C (97.2 °F) (Temporal)   Resp 18   Ht 1.575 m (5' 2\")   Wt 84 kg (185 lb 3 oz)   SpO2 96%     Constitutional: she is oriented to person, place, and time.  she appears well-developed and well-nourished. No distress.   Head: Normocephalic and atraumatic.   Neck: Normal range of motion. Neck supple. No JVD present. No tracheal deviation present. No thyromegaly present.   Cardiovascular: " Normal rate, regular rhythm, normal heart sounds and intact distal pulses.  Exam reveals no gallop and no friction rub.  No murmur heard.  Pulmonary/Chest: Effort normal and breath sounds normal. No stridor. No respiratory distress. she has no wheezes. she has no rales.   Abdominal: Soft. Bowel sounds are normal. she exhibits no distension and no mass. There is no tenderness. There is no rebound and no guarding.   Musculoskeletal: Normal range of motion. she exhibits no edema and no tenderness.   Neurological: she is alert and oriented to person, place, and time. she has normal reflexes. No cranial nerve deficit. Coordination normal.   Skin: Skin is warm and dry. No rash noted. she is not diaphoretic. No erythema. No pallor.   Psychiatric: she has a normal mood and affect.  Behavior is normal.         The radiologist's interpretation of all images has been reviewed by me.     Assessment: This is a 68 y.o. female with left breast cancer.    Plan: Left partial mastectomy and left axillary sentinel lymph node biopsy.     Jillian Goldsmith M.D.  [facility]  [phone]

## 2023-06-09 NOTE — ANESTHESIA PREPROCEDURE EVALUATION
Case: [id number] Date/Time: 06/09/23 1015    Procedures:       LEFT PARTIAL MASTECTOMY WITH AXILLARY SENTINEL LYMPH NODE BIOPSY AND POSSIBLE AXILLARY DISSECTION (Left: Breast)      BIOPSY, LYMPH NODE, SENTINEL (Left: Axilla)    Anesthesia type: General    Pre-op diagnosis: PRIMARY MALIGNATN NEOPLASM OF UPPER OUTER QUADRANT OF FEMALE BREAST    Location: [facility] ROOM 23 / SURGERY SAME DAY [facility]    Surgeons: Jillian Goldsmith M.D.          Relevant Problems   ANESTHESIA   (positive) ROMELIA (obstructive sleep apnea)      CARDIAC   (positive) Mitral valve prolapse   (positive) RBBB      ENDO   (positive) Subclinical hyperthyroidism      Other   (positive) Arthritis       Physical Exam    Airway   Mallampati: II  TM distance: >3 FB  Neck ROM: full       Cardiovascular - normal exam  Rhythm: regular  Rate: normal  (-) murmur     Dental - normal exam           Pulmonary - normal exam  Breath sounds clear to auscultation     Abdominal    Neurological - normal exam                 Anesthesia Plan    ASA 2       Plan - general       Airway plan will be ETT          Induction: intravenous    Postoperative Plan: Postoperative administration of opioids is intended.    Pertinent diagnostic labs and testing reviewed    Informed Consent:    Anesthetic plan and risks discussed with patient.    Use of blood products discussed with: patient whom consented to blood products.

## 2023-06-09 NOTE — PROGRESS NOTES
Discharge Instructions for Lumpectomy and [place] Lymph Node Biospy:    On the day of surgery we will be removing the lump of your breast cancer (lumpectomy) and through a separate incision under your arm we will be taking out the 2-3 lymph nodes that drain your breast (sentinel lymph node dissection).    Wound Care  You will have 2 incisions: 1) on your breast (lumpectomy) and 2) under your arm (sentinel lymph node biopsy).  All of your stitches are buried under the skin and dissolveable. There are no sutures to remove.   You can shower the day after your surgery and get your wound wet (it will be sealed by the waterproof dressings)  You may have some bruising after surgery. This is normal.  There may be a small amount of drainage from your wounds, this is usually normal and nothing to worry about. Place a dry gauze or bandage (available at any drug store) on the wound to absorb any drainage.  You can remove the dressing 2 days after surgery.     For Pain  Wear your bra as much as possible including to bed. The less your breast moves the less it will hurt.  You may take Tylenol or Advil every 4-6 hours as directed on the bottle.  You will be given a prescription for more severe pain. You can use it as needed.    Work  If you would like, you may return to work the day after your biopsy.  If you need a work excuse for the day of surgery or for any days thereafter, please let us know.    When to call the doctor  If you have severe, uncontrolled pain at the biopsy site.  If you run at fever of 100.5?F or higher within a few days of the biopsy.  If you have a large amount of drainage that is soaking the bandage more than once a day.  If the area around your wound becomes red/inflamed.  Make sure you schedule and attend all follow-up visits with your doctor. You should have a follow up appointment in about a week after surgery.    If you have any additional questions, please do not hesitate to call the office and speak  to either myself or the physician on call.    Office address:  [address] [place] [address] [address]  [place] [address]        Jillian Goldsmith MD  [facility]  [phone]

## 2023-06-09 NOTE — ANESTHESIA POSTPROCEDURE EVALUATION
Patient: Lashae Hoff    Procedure Summary     Date: 06/09/23 Room / Location: [facility] ROOM 23 / SURGERY SAME DAY [facility]    Anesthesia Start: 0950 Anesthesia Stop: 1045    Procedures:       LEFT PARTIAL MASTECTOMY WITH AXILLARY SENTINEL LYMPH NODE BIOPSY (Left: Breast)      BIOPSY, LYMPH NODE, SENTINEL (Left: Axilla) Diagnosis: (MALIGNATN NEOPLASM OF UPPER OUTER QUADRANT OF FEMALE BREAST)    Surgeons: Jillian Goldsmith M.D. Responsible Provider: Barrie Cooper M.D.    Anesthesia Type: general ASA Status: 2          Final Anesthesia Type: general  Last vitals  BP   Blood Pressure : 120/63    Temp   36.4 °C (97.6 °F)    Pulse   79   Resp   18    SpO2   95 %      Anesthesia Post Evaluation    Patient location during evaluation: PACU  Patient participation: complete - patient participated  Level of consciousness: awake and alert  Pain score: 3    Airway patency: patent  Anesthetic complications: no  Cardiovascular status: hemodynamically stable  Respiratory status: acceptable  Hydration status: euvolemic    PONV: none          No notable events documented.     Nurse Pain Score: 3 (NPRS)

## 2023-06-09 NOTE — OR NURSING
1043- Pt to PACU 1 from OR. Bedside report from anesthesiologist and RN.  Attached to monitoring, VSS, breathing is calm and unlabored, Patient is awake and calm at this time. Pt has incision on left breast with dermabond, CDI.  Remains on 3 L oxygen via mask.     1050-  Pt now on RA. Denies pain or nausea at this time. Pt has had sips of water and tolerating at this time.     1101- Pt medicated for moderate pain at this time. Ice pack applied to left breast and left arm elevated on pillow.     1121- Pt medicated per pain    1125- Pt's daughter called and updated    1134- pt medicated per MAR. Pt meets criteria to transition to phase II recovery    1243- pt medicated per MAR    1250- Pt dressed with minimal assistance. Steady on her feet.     1320- PIV removed with tip intact. Pt discharged home with all belongings.

## 2023-06-09 NOTE — ANESTHESIA PROCEDURE NOTES
Airway    Date/Time: 6/9/2023 10:00 AM    Performed by: Barrie Cooper M.D.  Authorized by: Barrie Cooper M.D.    Location:  OR  Urgency:  Elective  Indications for Airway Management:  Anesthesia      Spontaneous Ventilation: absent    Sedation Level:  Deep  Preoxygenated: Yes    Patient Position:  Sniffing  Mask Difficulty Assessment:  1 - vent by mask  Final Airway Type:  Endotracheal airway  Final Endotracheal Airway:  ETT  Cuffed: Yes    Technique Used for Successful ETT Placement:  Direct laryngoscopy    Insertion Site:  Oral  Blade Type:  Brent  Laryngoscope Blade/Videolaryngoscope Blade Size:  3  ETT Size (mm):  7.0  Measured from:  Teeth  ETT to Teeth (cm):  22  Placement Verified by: auscultation and capnometry    Cormack-Lehane Classification:  Grade IIa - partial view of glottis  Number of Attempts at Approach:  1

## 2023-06-09 NOTE — ANESTHESIA TIME REPORT
Anesthesia Start and Stop Event Times     Date Time Event    6/9/2023 0940 Ready for Procedure     0950 Anesthesia Start     1045 Anesthesia Stop        Responsible Staff  06/09/23    Name Role Begin End    Barrie Cooper M.D. Anesth 0950 1045        Overtime Reason:  no overtime (within assigned shift)    Comments:

## 2023-06-09 NOTE — OP REPORT
Operative Report    Date: 6/9/2023    Surgeon: Jillian Goldsmith M.D.    Assistant: Timmy LOBO    Anesthesiologist: Allison ESTES     Pre-operative Diagnosis:  C50.412 Malignant neoplasm of upper-outer quadrant of left female breast    Post-operative Diagnosis: same     Procedure: left breast partial mastectomy, left axillary sentinel lymph node biopsy, injection for identification of sentinel lymph node    62921 Mastectomy, partial   72833 Biopsy or excision of lymph node(s); open, deep axillary node(s)  32513 Intraoperative identification (eg, mapping) of sentinel lymph node(s) includes injection of non-radioactive dye     Procedure in detail: The patient was identified in the pre-operative holding area and brought to the operating room. Prior to the procedure, she underwent radionucleotide injection by nuclear medicine for sentinel lymph node identification.    Correct side and site were identified. Pre-operative antibiotics of ancef were administered prior to the procedure. Anesthesia was smoothly induced.    I injected 5 cc of methylene blue under the areola of the left breast, and the breast was then massaged briefly. The patient was then prepped and draped in the usual sterile fashion.    I then turned my attention to the partial mastectomy. The skin was infiltrated with local anesthetic and a curvilinear incision was made in the upper outer quadrant. The breast tissue was grasped with Allis clamps and a core of tissue was removed. The specimen was then completely removed, marked with suture for orientation, and was sent to pathology.     Additional tissue from the deep margin was excised, marked for new margin, and sent for permanent pathology. Meticulous hemostasis was achieved with electrocautery. The area was irrigated and suctioned. The skin was closed in two layers with vicryl and monocryl.     I proceeded with the sentinel lymph node biopsy portion of the procedure. A transverse incision was made in the  lower end of the axilla after anesthetizing the skin. The incision was carried deeper into the axillary tissue and a blue-stained lymphatic was identified and traced distally to a  lymph node, which was partially blue stained and highly radioactive. This was dissected free from its surrounding tissue.     There was a second blue-stained node found deep  to that, which also was radioactive.The residual radioactivity in the surgical bed was <10% of the initial count. The specimens were sent to pathology for permanent.     Evaluation of the axilla revealed no obvious malignant adenopathy.    After ensuring hemostasis, and irrigating the wound, I then closed the wound in two layers with vicryl and monocryl.     Sterile dressings were applied.     The patient was awakened from anesthetic, and was taken to the recovery room in stable condition.    Sponge and needle counts were correct at the end of the case.     Specimen:   1. left axillary sentinel lymph node tissue  2. left breast partial mastecomy  3. Additional tissue, deep margin, suture marks new margin    EBL: minimal    Dispo: stable, extubated, to PACU    Jillian Goldsmith M.D.  [facility]  [phone]

## 2023-06-21 ENCOUNTER — PATIENT OUTREACH (OUTPATIENT)
Dept: OTHER | Facility: MEDICAL CENTER | Age: 68
End: 2023-06-21
Payer: MEDICARE

## 2023-06-21 DIAGNOSIS — Z65.8 PSYCHOSOCIAL DISTRESS: ICD-10-CM

## 2023-06-21 NOTE — LETTER
[facility]    [address]  [place] [address]  Phone: [phone] - Fax: [phone]              Lashae Steve Hoff  [address] [place] [address]     Date: 06/21/23  Medical Record Number: [id number]    Dear Lashae,    I am a Cancer Nurse Navigator, a certified oncology nurse. My role is to assess any needs you may have with education, guidance and support. I am available to you and your family through your treatment at [facility].       I am available to address your needs during your journey with the following services:     Care Coordination  I can assist you in facilitating communication between your cancer care treatment team to ensure timely treatment and follow-up.  I can also assist with transition of care back to your primary care provider, or other specialist, as needed.  My goal is to bridge gaps for you throughout the course of your active treatment.       Education Services  Understanding the recommended treatment course by your physician is key. I can provide educational resources personalized to your cancer diagnosis to help you understand your diagnosis and treatment. Please let me know if you would like to receive information about your diagnosis and treatment plan.  I am here to help.     Support Services/Resource Information  [facility] we offer a full scope of support services.  I can assist you with referral information to:  Cancer Clinical Trials & Research  Nutrition counseling  Support groups  Complementary Therapies such as Mind-Body Techniques Meditation  Patient Financial Advocates    [facility], an [employer] affiliate office, our volunteers can assist you with accessing our [employer] library, support services information, head coverings and comfort items  Community and national resources, included eligibility based jensen assistance and pharmaceutical access programs, if you are in need of  additional information.     [facility] offers services that include:  Behavioral Health  Genetic counseling & testing  Lymphedema prevention/treatment program  Palliative care services.        I hope you have an excellent patient experience.  Please feel free to share with me your comments regarding the care you have received- we value your feedback.    Sincerely,     Ra Kasper R.N.  Cancer Nurse Navigator    Office: [phone] / [phone]   Email:  [email or web address]

## 2023-06-21 NOTE — PROGRESS NOTES
"Oncology Nurse Navigation Assessment  Phoned pt for follow up.  Pt scheduled for post op follow up with Dr. Goldsmith on 6/27/23.      DX: invasive ductal carcinoma of the left breast  HR+/HER2-    POC: s/p partial mastectomy on 6/9/23    FAMHX: Cancer-related family history includes Cancer in her father.             BARRIERS ASSESSMENT:    TRANSPORTATION: denies barriers  EMPLOYMENT:  retired   FINANCIAL:  denies barriers  INSURANCE:  Medicare / [employer]  SUPPORT SYSTEM:  daughter  PSYCHOLOGICAL: distress screening 3/10 \"doing good now\"   COMMUNICATION: no barrier noted    FAMILY CARE:  denies barriers  SELF CARE:  denies barriers         INTERVENTION:  Intro letter sent via My Chart.    "

## 2023-07-03 ENCOUNTER — HOSPITAL ENCOUNTER (OUTPATIENT)
Dept: LAB | Facility: MEDICAL CENTER | Age: 68
End: 2023-07-03
Attending: FAMILY MEDICINE
Payer: MEDICARE

## 2023-07-03 DIAGNOSIS — Z13.1 SCREENING FOR DIABETES MELLITUS: ICD-10-CM

## 2023-07-03 DIAGNOSIS — E78.5 DYSLIPIDEMIA, GOAL LDL BELOW 130: ICD-10-CM

## 2023-07-03 DIAGNOSIS — E55.9 VITAMIN D DEFICIENCY: ICD-10-CM

## 2023-07-03 DIAGNOSIS — R14.0 BLOATING: ICD-10-CM

## 2023-07-03 DIAGNOSIS — R73.09 ELEVATED GLUCOSE: ICD-10-CM

## 2023-07-03 DIAGNOSIS — E06.3 HASHIMOTO'S THYROIDITIS: ICD-10-CM

## 2023-07-03 LAB
25(OH)D3 SERPL-MCNC: 29 NG/ML (ref 30–100)
ALBUMIN SERPL BCP-MCNC: 4.6 G/DL (ref 3.2–4.9)
ALBUMIN/GLOB SERPL: 1.6 G/DL
ALP SERPL-CCNC: 138 U/L (ref 30–99)
ALT SERPL-CCNC: 14 U/L (ref 2–50)
ANION GAP SERPL CALC-SCNC: 15 MMOL/L (ref 7–16)
AST SERPL-CCNC: 10 U/L (ref 12–45)
BASOPHILS # BLD AUTO: 0.8 % (ref 0–1.8)
BASOPHILS # BLD: 0.05 K/UL (ref 0–0.12)
BILIRUB SERPL-MCNC: 0.5 MG/DL (ref 0.1–1.5)
BUN SERPL-MCNC: 12 MG/DL (ref 8–22)
CALCIUM ALBUM COR SERPL-MCNC: 8.8 MG/DL (ref 8.5–10.5)
CALCIUM SERPL-MCNC: 9.3 MG/DL (ref 8.5–10.5)
CHLORIDE SERPL-SCNC: 102 MMOL/L (ref 96–112)
CHOLEST SERPL-MCNC: 250 MG/DL (ref 100–199)
CO2 SERPL-SCNC: 21 MMOL/L (ref 20–33)
CREAT SERPL-MCNC: 0.61 MG/DL (ref 0.5–1.4)
CRP SERPL HS-MCNC: <0.3 MG/DL (ref 0–0.75)
EOSINOPHIL # BLD AUTO: 0.15 K/UL (ref 0–0.51)
EOSINOPHIL NFR BLD: 2.4 % (ref 0–6.9)
ERYTHROCYTE [DISTWIDTH] IN BLOOD BY AUTOMATED COUNT: 45.2 FL (ref 35.9–50)
ERYTHROCYTE [SEDIMENTATION RATE] IN BLOOD BY WESTERGREN METHOD: 6 MM/HOUR (ref 0–25)
EST. AVERAGE GLUCOSE BLD GHB EST-MCNC: 117 MG/DL
GFR SERPLBLD CREATININE-BSD FMLA CKD-EPI: 98 ML/MIN/1.73 M 2
GLOBULIN SER CALC-MCNC: 2.8 G/DL (ref 1.9–3.5)
GLUCOSE SERPL-MCNC: 103 MG/DL (ref 65–99)
HBA1C MFR BLD: 5.7 % (ref 4–5.6)
HCT VFR BLD AUTO: 46.7 % (ref 37–47)
HDLC SERPL-MCNC: 64 MG/DL
HGB BLD-MCNC: 15.6 G/DL (ref 12–16)
IMM GRANULOCYTES # BLD AUTO: 0.03 K/UL (ref 0–0.11)
IMM GRANULOCYTES NFR BLD AUTO: 0.5 % (ref 0–0.9)
LDLC SERPL CALC-MCNC: 155 MG/DL
LYMPHOCYTES # BLD AUTO: 1.71 K/UL (ref 1–4.8)
LYMPHOCYTES NFR BLD: 27.9 % (ref 22–41)
MCH RBC QN AUTO: 31.6 PG (ref 27–33)
MCHC RBC AUTO-ENTMCNC: 33.4 G/DL (ref 32.2–35.5)
MCV RBC AUTO: 94.5 FL (ref 81.4–97.8)
MONOCYTES # BLD AUTO: 0.61 K/UL (ref 0–0.85)
MONOCYTES NFR BLD AUTO: 9.9 % (ref 0–13.4)
NEUTROPHILS # BLD AUTO: 3.59 K/UL (ref 1.82–7.42)
NEUTROPHILS NFR BLD: 58.5 % (ref 44–72)
NRBC # BLD AUTO: 0 K/UL
NRBC BLD-RTO: 0 /100 WBC (ref 0–0.2)
PLATELET # BLD AUTO: 225 K/UL (ref 164–446)
PMV BLD AUTO: 12.1 FL (ref 9–12.9)
POTASSIUM SERPL-SCNC: 3.5 MMOL/L (ref 3.6–5.5)
PROT SERPL-MCNC: 7.4 G/DL (ref 6–8.2)
RBC # BLD AUTO: 4.94 M/UL (ref 4.2–5.4)
SODIUM SERPL-SCNC: 138 MMOL/L (ref 135–145)
T4 FREE SERPL-MCNC: 1.8 NG/DL (ref 0.93–1.7)
TRIGL SERPL-MCNC: 157 MG/DL (ref 0–149)
TSH SERPL DL<=0.005 MIU/L-ACNC: <0.005 UIU/ML (ref 0.38–5.33)
WBC # BLD AUTO: 6.1 K/UL (ref 4.8–10.8)

## 2023-07-03 PROCEDURE — 84439 ASSAY OF FREE THYROXINE: CPT

## 2023-07-03 PROCEDURE — 82306 VITAMIN D 25 HYDROXY: CPT

## 2023-07-03 PROCEDURE — 86364 TISS TRNSGLTMNASE EA IG CLAS: CPT

## 2023-07-03 PROCEDURE — 85652 RBC SED RATE AUTOMATED: CPT

## 2023-07-03 PROCEDURE — 83525 ASSAY OF INSULIN: CPT

## 2023-07-03 PROCEDURE — 86140 C-REACTIVE PROTEIN: CPT

## 2023-07-03 PROCEDURE — 82784 ASSAY IGA/IGD/IGG/IGM EACH: CPT

## 2023-07-03 PROCEDURE — 83036 HEMOGLOBIN GLYCOSYLATED A1C: CPT | Mod: GA

## 2023-07-03 PROCEDURE — 36415 COLL VENOUS BLD VENIPUNCTURE: CPT

## 2023-07-03 PROCEDURE — 80061 LIPID PANEL: CPT

## 2023-07-03 PROCEDURE — 84443 ASSAY THYROID STIM HORMONE: CPT

## 2023-07-03 PROCEDURE — 85025 COMPLETE CBC W/AUTO DIFF WBC: CPT

## 2023-07-03 PROCEDURE — 80053 COMPREHEN METABOLIC PANEL: CPT

## 2023-07-05 PROBLEM — C50.412 MALIGNANT NEOPLASM OF UPPER-OUTER QUADRANT OF LEFT BREAST IN FEMALE, ESTROGEN RECEPTOR POSITIVE (HCC): Status: ACTIVE | Noted: 2023-05-01

## 2023-07-05 PROBLEM — Z17.0 MALIGNANT NEOPLASM OF UPPER-OUTER QUADRANT OF LEFT BREAST IN FEMALE, ESTROGEN RECEPTOR POSITIVE (HCC): Status: ACTIVE | Noted: 2023-05-01

## 2023-07-05 LAB — INSULIN P FAST SERPL-ACNC: 16 UIU/ML (ref 3–25)

## 2023-07-06 LAB
IGA SERPL-MCNC: 151 MG/DL (ref 68–408)
TTG IGA SER IA-ACNC: <2 U/ML (ref 0–3)

## 2023-07-10 ENCOUNTER — OFFICE VISIT (OUTPATIENT)
Dept: MEDICAL GROUP | Facility: LAB | Age: 68
End: 2023-07-10
Payer: MEDICARE

## 2023-07-10 VITALS
TEMPERATURE: 97.3 F | WEIGHT: 185 LBS | HEIGHT: 62 IN | RESPIRATION RATE: 12 BRPM | BODY MASS INDEX: 34.04 KG/M2 | OXYGEN SATURATION: 96 % | SYSTOLIC BLOOD PRESSURE: 120 MMHG | DIASTOLIC BLOOD PRESSURE: 78 MMHG | HEART RATE: 100 BPM

## 2023-07-10 DIAGNOSIS — C50.412 MALIGNANT NEOPLASM OF UPPER-OUTER QUADRANT OF LEFT BREAST IN FEMALE, ESTROGEN RECEPTOR POSITIVE (HCC): ICD-10-CM

## 2023-07-10 DIAGNOSIS — Z17.0 MALIGNANT NEOPLASM OF UPPER-OUTER QUADRANT OF LEFT BREAST IN FEMALE, ESTROGEN RECEPTOR POSITIVE (HCC): ICD-10-CM

## 2023-07-10 PROBLEM — K80.20 GALLSTONE: Status: ACTIVE | Noted: 2023-07-10

## 2023-07-10 PROCEDURE — 3078F DIAST BP <80 MM HG: CPT | Performed by: FAMILY MEDICINE

## 2023-07-10 PROCEDURE — 99213 OFFICE O/P EST LOW 20 MIN: CPT | Performed by: FAMILY MEDICINE

## 2023-07-10 PROCEDURE — 3074F SYST BP LT 130 MM HG: CPT | Performed by: FAMILY MEDICINE

## 2023-07-10 ASSESSMENT — FIBROSIS 4 INDEX: FIB4 SCORE: 0.8

## 2023-07-10 NOTE — PROGRESS NOTES
"Subjective:     Chief Complaint   Patient presents with    Follow-Up         HPI:   Lashae presents today with follow-up of labs.  She also has recently been diagnosed with left-sided invasive ductal carcinoma of the left breast.  They were not able to get clear margins and so she does have to go back in later this month for reexcision.    Noted biopsy was negative for metastatic carcinoma.    Will do radiation in the future as well.   No chemo.     Celiac labs are all negative.  Labs discussed in general.      Current Outpatient Medications Ordered in Epic   Medication Sig Dispense Refill    Cholecalciferol (VITAMIN D) 2000 UNIT Tab Take 2,000 Units by mouth every day.      Calcium Carbonate-Vit D-Min (CALCIUM 1200 PO) Take  by mouth. Calcium 2000 mg daily      acetaminophen (TYLENOL) 500 MG Tab Take 500-1,000 mg by mouth every 6 hours as needed.       No current Epic-ordered facility-administered medications on file.         ROS:  Gen: no fevers/chills, no changes in weight  Eyes: no changes in vision  ENT: no sore throat, no hearing loss, no bloody nose  Pulm: no sob, no cough  CV: no chest pain, no palpitations  GI: no nausea/vomiting, no diarrhea  : no dysuria  MSk: no myalgias  Skin: no rash  Neuro: no headaches, no numbness/tingling  Heme/Lymph: no easy bruising      Objective:     Exam:  /78 (BP Location: Right arm, Patient Position: Sitting, BP Cuff Size: Adult)   Pulse 100   Temp 36.3 °C (97.3 °F)   Resp 12   Ht 1.575 m (5' 2\")   Wt 83.9 kg (185 lb)   SpO2 96%   BMI 33.84 kg/m²  Body mass index is 33.84 kg/m².    Gen: AAOx3, NAD, well appearing  HEENT: NCAT, EOMI, Nares patent, Mucosa moist  Resp: Normal chest wall rise and fall, not SOB, no tachypnea  Skin: no rash or abnormality of visible skin. Surgical incisions look good.   Psych: normal speech, not slurred, good insight, affect full  MSK: Moves all four limbs equally and normally, gait normal      Assessment & Plan:     68 y.o. female " with the following -     1. Malignant neoplasm of upper-outer quadrant of left breast in female, estrogen receptor positive (HCC)  Discussed reexcision upcoming.  Discussed recent labs.  Overall things are looking pretty good.  We did discuss still some bloating in the abdomen.  She is instructed to really work on cutting back any fake sugars or even real sugars in the diet.  She will cut back too much fiber.  Most of her fiber should come from fruits and veggies and not from grain based products.            No follow-ups on file.    Please note that this dictation was created using voice recognition software. I have made every reasonable attempt to correct obvious errors, but I expect that there are errors of grammar and possibly content that I did not discover before finalizing the note.

## 2023-07-11 ENCOUNTER — APPOINTMENT (OUTPATIENT)
Dept: ADMISSIONS | Facility: MEDICAL CENTER | Age: 68
End: 2023-07-11
Attending: SURGERY
Payer: MEDICARE

## 2023-07-17 ENCOUNTER — PRE-ADMISSION TESTING (OUTPATIENT)
Dept: ADMISSIONS | Facility: MEDICAL CENTER | Age: 68
End: 2023-07-17
Attending: SURGERY
Payer: MEDICARE

## 2023-07-20 ENCOUNTER — ANESTHESIA EVENT (OUTPATIENT)
Dept: SURGERY | Facility: MEDICAL CENTER | Age: 68
End: 2023-07-20
Payer: MEDICARE

## 2023-07-21 ENCOUNTER — ANESTHESIA (OUTPATIENT)
Dept: SURGERY | Facility: MEDICAL CENTER | Age: 68
End: 2023-07-21
Payer: MEDICARE

## 2023-07-21 ENCOUNTER — PHARMACY VISIT (OUTPATIENT)
Dept: PHARMACY | Facility: MEDICAL CENTER | Age: 68
End: 2023-07-21
Payer: COMMERCIAL

## 2023-07-21 ENCOUNTER — HOSPITAL ENCOUNTER (OUTPATIENT)
Facility: MEDICAL CENTER | Age: 68
End: 2023-07-21
Attending: SURGERY | Admitting: SURGERY
Payer: MEDICARE

## 2023-07-21 VITALS
WEIGHT: 187.39 LBS | HEIGHT: 62 IN | DIASTOLIC BLOOD PRESSURE: 66 MMHG | OXYGEN SATURATION: 94 % | HEART RATE: 75 BPM | BODY MASS INDEX: 34.48 KG/M2 | SYSTOLIC BLOOD PRESSURE: 134 MMHG | RESPIRATION RATE: 17 BRPM | TEMPERATURE: 97 F

## 2023-07-21 DIAGNOSIS — G89.18 POSTOPERATIVE PAIN: ICD-10-CM

## 2023-07-21 LAB — PATHOLOGY CONSULT NOTE: NORMAL

## 2023-07-21 PROCEDURE — 160028 HCHG SURGERY MINUTES - 1ST 30 MINS LEVEL 3: Performed by: SURGERY

## 2023-07-21 PROCEDURE — 160035 HCHG PACU - 1ST 60 MINS PHASE I: Performed by: SURGERY

## 2023-07-21 PROCEDURE — 160009 HCHG ANES TIME/MIN: Performed by: SURGERY

## 2023-07-21 PROCEDURE — 700101 HCHG RX REV CODE 250: Performed by: SURGERY

## 2023-07-21 PROCEDURE — 700102 HCHG RX REV CODE 250 W/ 637 OVERRIDE(OP): Performed by: ANESTHESIOLOGY

## 2023-07-21 PROCEDURE — 700101 HCHG RX REV CODE 250: Performed by: ANESTHESIOLOGY

## 2023-07-21 PROCEDURE — 160002 HCHG RECOVERY MINUTES (STAT): Performed by: SURGERY

## 2023-07-21 PROCEDURE — RXMED WILLOW AMBULATORY MEDICATION CHARGE: Performed by: SURGERY

## 2023-07-21 PROCEDURE — 160025 RECOVERY II MINUTES (STATS): Performed by: SURGERY

## 2023-07-21 PROCEDURE — 00400 ANES INTEGUMENTARY SYS NOS: CPT | Performed by: ANESTHESIOLOGY

## 2023-07-21 PROCEDURE — 88307 TISSUE EXAM BY PATHOLOGIST: CPT

## 2023-07-21 PROCEDURE — 700105 HCHG RX REV CODE 258: Mod: JZ | Performed by: ANESTHESIOLOGY

## 2023-07-21 PROCEDURE — 160046 HCHG PACU - 1ST 60 MINS PHASE II: Performed by: SURGERY

## 2023-07-21 PROCEDURE — 160048 HCHG OR STATISTICAL LEVEL 1-5: Performed by: SURGERY

## 2023-07-21 PROCEDURE — A9270 NON-COVERED ITEM OR SERVICE: HCPCS | Performed by: ANESTHESIOLOGY

## 2023-07-21 PROCEDURE — 700111 HCHG RX REV CODE 636 W/ 250 OVERRIDE (IP): Performed by: ANESTHESIOLOGY

## 2023-07-21 RX ORDER — OXYCODONE HCL 5 MG/5 ML
5 SOLUTION, ORAL ORAL
Status: COMPLETED | OUTPATIENT
Start: 2023-07-21 | End: 2023-07-21

## 2023-07-21 RX ORDER — CEFAZOLIN SODIUM 1 G/3ML
INJECTION, POWDER, FOR SOLUTION INTRAMUSCULAR; INTRAVENOUS PRN
Status: DISCONTINUED | OUTPATIENT
Start: 2023-07-21 | End: 2023-07-21 | Stop reason: SURG

## 2023-07-21 RX ORDER — MIDAZOLAM HYDROCHLORIDE 1 MG/ML
INJECTION INTRAMUSCULAR; INTRAVENOUS PRN
Status: DISCONTINUED | OUTPATIENT
Start: 2023-07-21 | End: 2023-07-21 | Stop reason: SURG

## 2023-07-21 RX ORDER — DIPHENHYDRAMINE HYDROCHLORIDE 50 MG/ML
12.5 INJECTION INTRAMUSCULAR; INTRAVENOUS
Status: DISCONTINUED | OUTPATIENT
Start: 2023-07-21 | End: 2023-07-21 | Stop reason: HOSPADM

## 2023-07-21 RX ORDER — CELECOXIB 200 MG/1
200 CAPSULE ORAL ONCE
Status: COMPLETED | OUTPATIENT
Start: 2023-07-21 | End: 2023-07-21

## 2023-07-21 RX ORDER — LIDOCAINE HYDROCHLORIDE 10 MG/ML
INJECTION, SOLUTION EPIDURAL; INFILTRATION; INTRACAUDAL; PERINEURAL
Status: DISCONTINUED
Start: 2023-07-21 | End: 2023-07-21 | Stop reason: HOSPADM

## 2023-07-21 RX ORDER — HYDROCODONE BITARTRATE AND ACETAMINOPHEN 5; 325 MG/1; MG/1
1-2 TABLET ORAL EVERY 6 HOURS PRN
Qty: 30 TABLET | Refills: 0 | Status: SHIPPED | OUTPATIENT
Start: 2023-07-21 | End: 2023-07-28

## 2023-07-21 RX ORDER — ONDANSETRON 2 MG/ML
4 INJECTION INTRAMUSCULAR; INTRAVENOUS
Status: DISCONTINUED | OUTPATIENT
Start: 2023-07-21 | End: 2023-07-21 | Stop reason: HOSPADM

## 2023-07-21 RX ORDER — SODIUM CHLORIDE, SODIUM LACTATE, POTASSIUM CHLORIDE, CALCIUM CHLORIDE 600; 310; 30; 20 MG/100ML; MG/100ML; MG/100ML; MG/100ML
INJECTION, SOLUTION INTRAVENOUS CONTINUOUS
Status: DISCONTINUED | OUTPATIENT
Start: 2023-07-21 | End: 2023-07-21 | Stop reason: HOSPADM

## 2023-07-21 RX ORDER — ACETAMINOPHEN 500 MG
1000 TABLET ORAL ONCE
Status: COMPLETED | OUTPATIENT
Start: 2023-07-21 | End: 2023-07-21

## 2023-07-21 RX ORDER — SODIUM CHLORIDE, SODIUM LACTATE, POTASSIUM CHLORIDE, CALCIUM CHLORIDE 600; 310; 30; 20 MG/100ML; MG/100ML; MG/100ML; MG/100ML
INJECTION, SOLUTION INTRAVENOUS
Status: DISCONTINUED | OUTPATIENT
Start: 2023-07-21 | End: 2023-07-21 | Stop reason: SURG

## 2023-07-21 RX ORDER — BUPIVACAINE HYDROCHLORIDE 5 MG/ML
INJECTION, SOLUTION EPIDURAL; INTRACAUDAL
Status: DISCONTINUED
Start: 2023-07-21 | End: 2023-07-21 | Stop reason: HOSPADM

## 2023-07-21 RX ORDER — LIDOCAINE HYDROCHLORIDE 20 MG/ML
INJECTION, SOLUTION EPIDURAL; INFILTRATION; INTRACAUDAL; PERINEURAL PRN
Status: DISCONTINUED | OUTPATIENT
Start: 2023-07-21 | End: 2023-07-21 | Stop reason: SURG

## 2023-07-21 RX ORDER — BUPIVACAINE HYDROCHLORIDE AND EPINEPHRINE 5; 5 MG/ML; UG/ML
INJECTION, SOLUTION PERINEURAL
Status: DISCONTINUED | OUTPATIENT
Start: 2023-07-21 | End: 2023-07-21 | Stop reason: HOSPADM

## 2023-07-21 RX ORDER — ONDANSETRON 2 MG/ML
INJECTION INTRAMUSCULAR; INTRAVENOUS PRN
Status: DISCONTINUED | OUTPATIENT
Start: 2023-07-21 | End: 2023-07-21 | Stop reason: SURG

## 2023-07-21 RX ORDER — HALOPERIDOL 5 MG/ML
1 INJECTION INTRAMUSCULAR
Status: DISCONTINUED | OUTPATIENT
Start: 2023-07-21 | End: 2023-07-21 | Stop reason: HOSPADM

## 2023-07-21 RX ORDER — DEXAMETHASONE SODIUM PHOSPHATE 4 MG/ML
INJECTION, SOLUTION INTRA-ARTICULAR; INTRALESIONAL; INTRAMUSCULAR; INTRAVENOUS; SOFT TISSUE PRN
Status: DISCONTINUED | OUTPATIENT
Start: 2023-07-21 | End: 2023-07-21 | Stop reason: SURG

## 2023-07-21 RX ORDER — EPHEDRINE SULFATE 50 MG/ML
5 INJECTION, SOLUTION INTRAVENOUS
Status: DISCONTINUED | OUTPATIENT
Start: 2023-07-21 | End: 2023-07-21 | Stop reason: HOSPADM

## 2023-07-21 RX ORDER — HYDROMORPHONE HYDROCHLORIDE 1 MG/ML
0.2 INJECTION, SOLUTION INTRAMUSCULAR; INTRAVENOUS; SUBCUTANEOUS
Status: DISCONTINUED | OUTPATIENT
Start: 2023-07-21 | End: 2023-07-21 | Stop reason: HOSPADM

## 2023-07-21 RX ORDER — OXYCODONE HCL 5 MG/5 ML
10 SOLUTION, ORAL ORAL
Status: COMPLETED | OUTPATIENT
Start: 2023-07-21 | End: 2023-07-21

## 2023-07-21 RX ORDER — HYDRALAZINE HYDROCHLORIDE 20 MG/ML
5 INJECTION INTRAMUSCULAR; INTRAVENOUS
Status: DISCONTINUED | OUTPATIENT
Start: 2023-07-21 | End: 2023-07-21 | Stop reason: HOSPADM

## 2023-07-21 RX ORDER — HYDROMORPHONE HYDROCHLORIDE 1 MG/ML
0.1 INJECTION, SOLUTION INTRAMUSCULAR; INTRAVENOUS; SUBCUTANEOUS
Status: DISCONTINUED | OUTPATIENT
Start: 2023-07-21 | End: 2023-07-21 | Stop reason: HOSPADM

## 2023-07-21 RX ORDER — HYDROMORPHONE HYDROCHLORIDE 1 MG/ML
0.4 INJECTION, SOLUTION INTRAMUSCULAR; INTRAVENOUS; SUBCUTANEOUS
Status: DISCONTINUED | OUTPATIENT
Start: 2023-07-21 | End: 2023-07-21 | Stop reason: HOSPADM

## 2023-07-21 RX ADMIN — OXYCODONE HYDROCHLORIDE 5 MG: 5 SOLUTION ORAL at 10:33

## 2023-07-21 RX ADMIN — ACETAMINOPHEN 1000 MG: 500 TABLET, FILM COATED ORAL at 08:46

## 2023-07-21 RX ADMIN — SODIUM CHLORIDE, POTASSIUM CHLORIDE, SODIUM LACTATE AND CALCIUM CHLORIDE: 600; 310; 30; 20 INJECTION, SOLUTION INTRAVENOUS at 09:24

## 2023-07-21 RX ADMIN — ONDANSETRON 4 MG: 2 INJECTION INTRAMUSCULAR; INTRAVENOUS at 09:34

## 2023-07-21 RX ADMIN — DEXAMETHASONE SODIUM PHOSPHATE 4 MG: 4 INJECTION INTRA-ARTICULAR; INTRALESIONAL; INTRAMUSCULAR; INTRAVENOUS; SOFT TISSUE at 09:32

## 2023-07-21 RX ADMIN — LIDOCAINE HYDROCHLORIDE 50 MG: 20 INJECTION, SOLUTION EPIDURAL; INFILTRATION; INTRACAUDAL at 09:28

## 2023-07-21 RX ADMIN — MIDAZOLAM 1 MG: 1 INJECTION, SOLUTION INTRAMUSCULAR; INTRAVENOUS at 09:22

## 2023-07-21 RX ADMIN — FENTANYL CITRATE 50 MCG: 50 INJECTION, SOLUTION INTRAMUSCULAR; INTRAVENOUS at 09:24

## 2023-07-21 RX ADMIN — CELECOXIB 200 MG: 200 CAPSULE ORAL at 08:47

## 2023-07-21 RX ADMIN — CEFAZOLIN 2 G: 1 INJECTION, POWDER, FOR SOLUTION INTRAMUSCULAR; INTRAVENOUS at 09:30

## 2023-07-21 RX ADMIN — PROPOFOL 150 MG: 10 INJECTION, EMULSION INTRAVENOUS at 09:28

## 2023-07-21 ASSESSMENT — PAIN DESCRIPTION - PAIN TYPE: TYPE: SURGICAL PAIN

## 2023-07-21 ASSESSMENT — PAIN SCALES - GENERAL: PAIN_LEVEL: 0

## 2023-07-21 ASSESSMENT — FIBROSIS 4 INDEX: FIB4 SCORE: 0.8

## 2023-07-21 NOTE — ANESTHESIA POSTPROCEDURE EVALUATION
Patient: Lashae Hoff    Procedure Summary     Date: 07/21/23 Room / Location: [facility] 23 / SURGERY SAME DAY [facility]    Anesthesia Start: 0923 Anesthesia Stop: 0949    Procedure: LEFT RE-EXCISION PARTIAL MASTECTOMY (Left: Breast) Diagnosis: (PERSONAL HISTORY OF PRIMARY MALIGNANT NEOPLASM OF UPPER OUTER BREAST)    Surgeons: Jillian Goldsmith M.D. Responsible Provider: Dom Arizmendi M.D.    Anesthesia Type: general ASA Status: 2          Final Anesthesia Type: general  Last vitals  BP   Blood Pressure : 117/72    Temp   36.1 °C (97 °F)    Pulse   (!) 108   Resp   20    SpO2   100 %      Anesthesia Post Evaluation    Patient location during evaluation: PACU  Patient participation: complete - patient participated  Level of consciousness: awake and alert  Pain score: 0    Airway patency: patent  Anesthetic complications: no  Cardiovascular status: hemodynamically stable  Respiratory status: acceptable  Hydration status: euvolemic    PONV: none          No notable events documented.     Nurse Pain Score: 0 (NPRS)

## 2023-07-21 NOTE — ANESTHESIA PREPROCEDURE EVALUATION
Case: [id number] Date/Time: 07/21/23 0915    Procedure: LEFT RE-EXCISION PARTIAL MASTECTOMY    Pre-op diagnosis: PERSONAL HISTORY OF PRIMARY MALIGNANT NEOPLASM OF BREAST    Location: [facility] ROOM 23 / SURGERY SAME DAY [facility]    Surgeons: Jillian Goldsmith M.D.      69 yo    P Med Hx:  ROMELIA  Arthritis of knee, right  Bruxism  GERD (gastroesophageal reflux disease)  History of hyperthyroidism  Chickenpox  Dyslipidemia, goal LDL below 130  Snoring  Elevated fasting glucose  Allergy  Thyroid disease  Asthma  Blood transfusion without reported diagnosis  Head ache  Mitral valve prolapse      Relevant Problems   ANESTHESIA   (positive) ROMELIA (obstructive sleep apnea)      CARDIAC   (positive) Mitral valve prolapse   (positive) RBBB      ENDO   (positive) Subclinical hyperthyroidism      Other   (positive) Arthritis       Physical Exam    Airway   Mallampati: II  TM distance: >3 FB  Neck ROM: full       Cardiovascular - normal exam  Rhythm: regular  Rate: normal  (-) murmur     Dental - normal exam           Pulmonary - normal exam  Breath sounds clear to auscultation     Abdominal    Neurological - normal exam                 Anesthesia Plan    ASA 2       Plan - general       Airway plan will be LMA          Induction: intravenous    Postoperative Plan: Postoperative administration of opioids is intended.    Pertinent diagnostic labs and testing reviewed    Informed Consent:    Anesthetic plan and risks discussed with patient.    Use of blood products discussed with: patient whom consented to blood products.

## 2023-07-21 NOTE — ANESTHESIA TIME REPORT
Anesthesia Start and Stop Event Times     Date Time Event    7/21/2023 0916 Ready for Procedure     0923 Anesthesia Start     0949 Anesthesia Stop        Responsible Staff  07/21/23    Name Role Begin End    Dom Arizmendi M.D. Anesth 0923 0949        Overtime Reason:  no overtime (within assigned shift)    Comments:

## 2023-07-21 NOTE — OP REPORT
Operative Report    Date: 7/21/2023    Surgeon: Jillian Goldsmith M.D.    Assistant: ALICE Sierra    Anesthesiologist: Kyleigh ESTES    Pre-operative Diagnosis:  C50.412 Malignant neoplasm of upper-outer quadrant of left female breast    Post-operative Diagnosis: same     Procedure: re-excision left partial mastectomy (19301 Mastectomy, partial)    Procedure in detail: The patient was identified in the pre-operative holding area and brought to the operating room. Correct side and site were identified. Pre-operative antibiotics of ancef were administered prior to the procedure. Anesthesia was smoothly induced.The patient was then prepped and draped in the usual sterile fashion.    The skin was infiltrated with local anesthetic and the prior incision was reopened and the partial mastectomy cavity evacuated of seroma. Additional tissue was taken from the inferior margin, marked with suture for orientation, and was sent for permanent pathology. Meticulous hemostasis was achieved with electrocautery. The area was irrigated and suctioned. The skin was closed in two layers with vicryl and monocryl. Sterile dressings were applied.     The patient was awakened from anesthetic, and was taken to the recovery room in stable condition.    Sponge and needle counts were correct at the end of the case.     Specimen:   1. Additional tissue, inferior margin, suture marks new margin    EBL: minimal    Dispo: stable, extubated, to PACU    Jillian Goldsmith M.D.  [facility]  [phone]        C50.0 Malignant neoplasm of nipple and areola  C50.01 Malignant neoplasm of nipple and areola, female  C50.011 Malignant neoplasm of nipple and areola, right female breast  C50.012 Malignant neoplasm of nipple and areola, left female breast  C50.019 Malignant neoplasm of nipple and areola, unspecified female breast  C50.02 Malignant neoplasm of nipple and areola, male  C50.021 Malignant neoplasm of nipple and areola, right male  breast  C50.022 Malignant neoplasm of nipple and areola, left male breast  C50.029 Malignant neoplasm of nipple and areola, unspecified male breast    C50.1 Malignant neoplasm of central portion of breast  C50.11 Malignant neoplasm of central portion of breast, female  C50.111 Malignant neoplasm of central portion of right female breast  C50.112 Malignant neoplasm of central portion of left female breast  C50.119 Malignant neoplasm of central portion of unspecified female breast  C50.12 Malignant neoplasm of central portion of breast, male  C50.121 Malignant neoplasm of central portion of right male breast  C50.122 Malignant neoplasm of central portion of left male breast  C50.129 Malignant neoplasm of central portion of unspecified male breast    C50.2 Malignant neoplasm of upper-inner quadrant of breast  C50.21 Malignant neoplasm of upper-inner quadrant of breast, female  C50.211 Malignant neoplasm of upper-inner quadrant of right female breast  C50.212 Malignant neoplasm of upper-inner quadrant of left female breast  C50.219 Malignant neoplasm of upper-inner quadrant of unspecified female breast  C50.22 Malignant neoplasm of upper-inner quadrant of breast, male  C50.221 Malignant neoplasm of upper-inner quadrant of right male breast  C50.222 Malignant neoplasm of upper-inner quadrant of left male breast  C50.229 Malignant neoplasm of upper-inner quadrant of unspecified male breast    C50.3 Malignant neoplasm of lower-inner quadrant of breast  C50.31 Malignant neoplasm of lower-inner quadrant of breast, female  C50.311 Malignant neoplasm of lower-inner quadrant of right female breast  C50.312 Malignant neoplasm of lower-inner quadrant of left female breast  C50.319 Malignant neoplasm of lower-inner quadrant of unspecified female breast  C50.32 Malignant neoplasm of lower-inner quadrant of breast, male  C50.321 Malignant neoplasm of lower-inner quadrant of right male breast  C50.322 Malignant neoplasm of  lower-inner quadrant of left male breast  C50.329 Malignant neoplasm of lower-inner quadrant of unspecified male breast    C50.4 Malignant neoplasm of upper-outer quadrant of breast  C50.41 Malignant neoplasm of upper-outer quadrant of breast, female  C50.411 Malignant neoplasm of upper-outer quadrant of right female breast  C50.412 Malignant neoplasm of upper-outer quadrant of left female breast  C50.419 Malignant neoplasm of upper-outer quadrant of unspecified female breast  C50.42 Malignant neoplasm of upper-outer quadrant of breast, male  C50.421 Malignant neoplasm of upper-outer quadrant of right male breast  C50.422 Malignant neoplasm of upper-outer quadrant of left male breast  C50.429 Malignant neoplasm of upper-outer quadrant of unspecified male breast    C50.5 Malignant neoplasm of lower-outer quadrant of breast  C50.51 Malignant neoplasm of lower-outer quadrant of breast, female  C50.511 Malignant neoplasm of lower-outer quadrant of right female breast  C50.512 Malignant neoplasm of lower-outer quadrant of left female breast  C50.519 Malignant neoplasm of lower-outer quadrant of unspecified female breast  C50.52 Malignant neoplasm of lower-outer quadrant of breast, male  C50.521 Malignant neoplasm of lower-outer quadrant of right male breast  C50.522 Malignant neoplasm of lower-outer quadrant of left male breast  C50.529 Malignant neoplasm of lower-outer quadrant of unspecified male breast    C50.6 Malignant neoplasm of axillary tail of breast  C50.61 Malignant neoplasm of axillary tail of breast, female  C50.611 Malignant neoplasm of axillary tail of right female breast  C50.612 Malignant neoplasm of axillary tail of left female breast  C50.619 Malignant neoplasm of axillary tail of unspecified female breast  C50.62 Malignant neoplasm of axillary tail of breast, male  C50.621 Malignant neoplasm of axillary tail of right male breast  C50.622 Malignant neoplasm of axillary tail of left male breast  C50.629  Malignant neoplasm of axillary tail of unspecified male breast    C50.8 Malignant neoplasm of overlapping sites of breast  C50.81 Malignant neoplasm of overlapping sites of breast, female  C50.811 Malignant neoplasm of overlapping sites of right female breast  C50.812 Malignant neoplasm of overlapping sites of left female breast  C50.819 Malignant neoplasm of overlapping sites of unspecified female breast  C50.82 Malignant neoplasm of overlapping sites of breast, male  C50.821 Malignant neoplasm of overlapping sites of right male breast  C50.822 Malignant neoplasm of overlapping sites of left male breast  C50.829 Malignant neoplasm of overlapping sites of unspecified male breast

## 2023-07-21 NOTE — DISCHARGE INSTRUCTIONS
HOME CARE INSTRUCTIONS    ACTIVITY: Rest and take it easy for the first 24 hours.  A responsible adult is recommended to remain with you during that time.  It is normal to feel sleepy.  We encourage you to not do anything that requires balance, judgment or coordination.    FOR 24 HOURS DO NOT:  Drive, operate machinery or run household appliances.  Drink beer or alcoholic beverages.  Make important decisions or sign legal documents.    SPECIAL INSTRUCTIONS:     Discharge Instructions, Lumpectomy:     Care of Your Incisions:   You can shower with the dressing on as it is waterproof.  Avoid getting lotions, powders or deodorant on the incision while it is healing.   Don’t worry if the area under either incision feels firm or hard. This is normal and usually softens within a few months.     How to Manage Discomfort After Surgery:   It is normal to have tenderness, discomfort or mild swelling at the site of the incisions.      You may also feel:  - Numbness at the incisions.  - Occasional shooting pains in the breast as you heal.      You may be given a prescription for pain medication prior to being discharged from the hospital. If you are having pain, take the medication as directed by your physician and by the label directions. You should be comfortable and moving around. Most women use some prescription pain medication, though some need only over the counter pain medication, such as ibuprofen (Motrin) or acetaminophen (Tylenol). Some women have little pain and take nothing at all. Whatever amount of pain you have, it is important to listen to your body and use the medication if needed during your recovery.      Prescription pain medication may cause constipation. If you are having problems, use what you normally would or call your nurse for suggestions. It also helps to stay regular by including fiber in your diet (for example: bran or fruits and vegetables) and drink plenty of liquids (water, juice, etc.).      Activity:   You may resume your normal routine, but avoid heavy lifting (over 10 pounds), pushing or pulling until your first post-operative visit, unless otherwise specified by your surgeon.   Do not drive for at least 24-48 hours after surgery (unless otherwise directed by your surgeon), or while you are taking prescription pain medicine. Prescription pain medicine causes drowsiness (makes you sleepy) so you should avoid doing any tasks where you should be awake and alert (driving, operating machinery, etc).     When to Call Your Doctor/Nurse:   If you have a fever greater than 100.5, increased pain, redness or warmth at the area around the incision, drainage from the incision or around the drain, or swelling of the arm. Call Dr. Goldsmith's office at [phone] with any other questions or concerns.     You should have an appointment to see Dr. Goldsmith about a week after surgery, call [phone] if you do not already have an appointment.     Office address:  [address] [place] [address] [id number]  [place] [address]        Jillian Goldsmith M.D.  [facility]  [phone]    DIET: To avoid nausea, slowly advance diet as tolerated, avoiding spicy or greasy foods for the first day.  Add more substantial food to your diet according to your physician's instructions.  Babies can be fed formula or breast milk as soon as they are hungry.  INCREASE FLUIDS AND FIBER TO AVOID CONSTIPATION.    Prescription given for NORCO.  Last pain medication given at                               .    A follow-up appointment should be arranged with your doctor; call to schedule.    You should CALL YOUR PHYSICIAN if you develop:  Fever greater than 101 degrees F.  Pain not relieved by medication, or persistent nausea or vomiting.  Excessive bleeding (blood soaking through dressing) or unexpected drainage from the wound.  Extreme redness or swelling around the incision site, drainage of pus or foul smelling drainage.  Inability to urinate or empty  your bladder within 8 hours.  Problems with breathing or chest pain.    You should call 911 if you develop problems with breathing or chest pain.  If you are unable to contact your doctor or surgical center, you should go to the nearest emergency room or urgent care center.     MILD FLU-LIKE SYMPTOMS ARE NORMAL.  YOU MAY EXPERIENCE GENERALIZED MUSCLE ACHES, THROAT IRRITATION, HEADACHE AND/OR SOME NAUSEA.    If any questions arise, call your doctor.  If your doctor is not available, please feel free to call the Surgical Center at [phone].  The Center is open Monday through Friday from 7AM to 7PM.      A registered nurse may call you a few days after your surgery to see how you are doing after your procedure.    You may also receive a survey in the mail within the next two weeks and we ask that you take a few moments to complete the survey and return it to us.  Our goal is to provide you with very good care and we value your comments.     Depression / Suicide Risk    As you are discharged from this [facility] facility, it is important to learn how to keep safe from harming yourself.    Recognize the warning signs:  Abrupt changes in personality, positive or negative- including increase in energy   Giving away possessions  Change in eating patterns- significant weight changes-  positive or negative  Change in sleeping patterns- unable to sleep or sleeping all the time   Unwillingness or inability to communicate  Depression  Unusual sadness, discouragement and loneliness  Talk of wanting to die  Neglect of personal appearance   Rebelliousness- reckless behavior  Withdrawal from people/activities they love  Confusion- inability to concentrate     If you or a loved one observes any of these behaviors or has concerns about self-harm, here's what you can do:  Talk about it- your feelings and reasons for harming yourself  Remove any means that you might use to hurt yourself (examples: pills, rope, extension cords,  firearm)  Get professional help from the community (Mental Health, Substance Abuse, psychological counseling)  Do not be alone:Call your Safe Contact- someone whom you trust who will be there for you.  Call your local CRISIS HOTLINE 275-9890 or 035-919-2283  Call your local Children's Mobile Crisis Response Team [place] (245) 716-2772 or www.Ring  Call the toll free National Suicide Prevention Hotlines   National Suicide Prevention Lifeline 492-397-CAWA (5044)  [facility] Line Network 800-SUICIDE (330-0352)    I acknowledge receipt and understanding of these Home Care instructions.

## 2023-07-21 NOTE — ANESTHESIA PROCEDURE NOTES
Airway    Date/Time: 7/21/2023 9:28 AM    Performed by: Dom Arizmendi M.D.  Authorized by: Dom Arizmendi M.D.    Location:  OR  Urgency:  Elective  Indications for Airway Management:  Anesthesia      Spontaneous Ventilation: absent    Sedation Level:  Deep  Preoxygenated: Yes    Mask Difficulty Assessment:  1 - vent by mask  Final Airway Type:  Supraglottic airway  Final Supraglottic Airway:  Standard LMA    SGA Size:  4  Number of Attempts at Approach:  1

## 2023-07-21 NOTE — OR NURSING
0980 patient arrival from OR; report received from MD and RN. Patient attached to monitor and VSS with patient on 10L O2 via mask. Site to left breast visualized. Closed with dermabond and is clean, dry, and intact.    0946 call to patient's daughter, Janel for updates. All questions answered at this time.     1015 patient meeting phase 2 criteria this time.     1045 daughter brought back to bedside to assist patient getting dressed at this time.     1050 patient removed from monitor and up getting dressed at this time.     1110 patient dressed and discharge instructions gone over with patient and patient's daughter at the bedside. All questions answered at this time.     1115 patient discharged with all belongings at this time.

## 2023-07-24 ASSESSMENT — LIFESTYLE VARIABLES
SMOKING_STATUS: NO
TOBACCO_USE: NO

## 2023-07-26 ENCOUNTER — HOSPITAL ENCOUNTER (OUTPATIENT)
Dept: RADIOLOGY | Facility: MEDICAL CENTER | Age: 68
End: 2023-07-26
Attending: OBSTETRICS & GYNECOLOGY
Payer: MEDICARE

## 2023-07-27 ENCOUNTER — HOSPITAL ENCOUNTER (OUTPATIENT)
Dept: RADIATION ONCOLOGY | Facility: MEDICAL CENTER | Age: 68
End: 2023-07-31
Attending: RADIOLOGY
Payer: MEDICARE

## 2023-07-27 VITALS
WEIGHT: 187.17 LBS | HEIGHT: 62 IN | DIASTOLIC BLOOD PRESSURE: 85 MMHG | OXYGEN SATURATION: 95 % | BODY MASS INDEX: 34.44 KG/M2 | HEART RATE: 108 BPM | SYSTOLIC BLOOD PRESSURE: 136 MMHG

## 2023-07-27 DIAGNOSIS — Z17.0 MALIGNANT NEOPLASM OF UPPER-OUTER QUADRANT OF LEFT BREAST IN FEMALE, ESTROGEN RECEPTOR POSITIVE (HCC): ICD-10-CM

## 2023-07-27 DIAGNOSIS — C50.412 MALIGNANT NEOPLASM OF UPPER-OUTER QUADRANT OF LEFT BREAST IN FEMALE, ESTROGEN RECEPTOR POSITIVE (HCC): ICD-10-CM

## 2023-07-27 PROCEDURE — 99214 OFFICE O/P EST MOD 30 MIN: CPT | Performed by: RADIOLOGY

## 2023-07-27 PROCEDURE — 99205 OFFICE O/P NEW HI 60 MIN: CPT | Performed by: RADIOLOGY

## 2023-07-27 ASSESSMENT — PAIN SCALES - GENERAL: PAINLEVEL: NO PAIN

## 2023-07-27 ASSESSMENT — FIBROSIS 4 INDEX: FIB4 SCORE: 0.8

## 2023-07-27 NOTE — CONSULTS
RADIATION ONCOLOGY CONSULT    DATE OF SERVICE: 7/27/2023    IDENTIFICATION:  Stage IA (T2N0M0) G1 invasive ductal carcinoma of the left upper outer quadrant of breast, ER/CT positive HER2/igor negative with a Ki-67 of less than 5%, status post lumpectomy and sentinel lymph node biopsy followed by reexcision completing on 7/21/2023, Oncotype score pending, planning on an aromatase inhibitor.      HISTORY OF PRESENT ILLNESS: I had the pleasure of seeing Ms. Hoff today in consultation at the request of Dr. Goldsmith for her breast cancer.  Patient is a 68-year-old healthy woman who had not had mammograms in several years.  When she reinitiated mammograms there was evidence of a focal asymmetry in the left upper outer quadrant which had not previously been present.  Ultrasound of this area showed an irregular hypoechoic lesion measuring 2.1 cm in size.  Biopsy of this area showed an invasive ductal carcinoma with grade 1 features.  Tumor was ER/CT positive HER2/igor negative with a Ki-67 of less than 5%.  She elected to proceed with breast conservation surgery and had a lumpectomy and sentinel lymph node biopsy on 6/9/2023.  This confirmed a 2.7 cm invasive ductal carcinoma with grade 1 features.  The inferior margin was positive over a 1 mm breath.  0 of 3 lymph nodes were involved with malignancy.  She went back for reexcision for margins on 7/21/2023.  There was no residual malignancy identified.  Given the size of the tumor she has an Oncotype score pending.  She does plan to proceed with an aromatase inhibitor.  She presents to me today to further discuss the role of radiation as a part of breast conservation therapy.    PAST MEDICAL HISTORY:   Past Medical History:   Diagnosis Date    Allergy     Arrhythmia     Bundle branch block, pt told no need to followup with cardiologist    Arthritis of knee, right     ?    Asthma     as child    Blood transfusion without reported diagnosis     tonsillectomy    Bruxism      Cancer (HCC) 2023    breast cancer    Chickenpox     Dyslipidemia, goal LDL below 130     Elevated fasting glucose     GERD (gastroesophageal reflux disease)     Head ache     TMJ    Heart murmur     History of asthma     History of hyperthyroidism     hachimoto's thyroiditis    Malignant neoplasm of upper-outer quadrant of left female breast (HCC)     Mitral valve prolapse     Sleep apnea     no CPAP    Snoring     Thyroid disease        PAST SURGICAL HISTORY:  Past Surgical History:   Procedure Laterality Date    PB MASTECTOMY, PARTIAL Left 2023    Procedure: LEFT RE-EXCISION PARTIAL MASTECTOMY;  Surgeon: Jillian Goldsmith M.D.;  Location: SURGERY SAME DAY [facility];  Service: General    PB MASTECTOMY, PARTIAL Left 2023    Procedure: LEFT PARTIAL MASTECTOMY WITH AXILLARY SENTINEL LYMPH NODE BIOPSY;  Surgeon: Jillian Goldsmith M.D.;  Location: SURGERY SAME DAY [facility];  Service: General    NODE BIOPSY SENTINEL Left 2023    Procedure: BIOPSY, LYMPH NODE, SENTINEL;  Surgeon: Jillian Goldsmith M.D.;  Location: SURGERY SAME DAY [facility];  Service: General    COLONOSCOPY  2018    EGD ESOPHAGUS WITH ENDOSCOPIC US      BRAD BY LAPAROSCOPY      TONSILLECTOMY         GYNECOLOGICAL STATUS:  : 3 and Para: 3    HORMONE USE:  Post-menopause use 0 years. Patient reports one month of HRT post-menopause.     CURRENT MEDICATIONS:  Current Outpatient Medications   Medication Sig Dispense Refill    Multiple Vitamins-Minerals (PRESERVISION AREDS 2 PO) Take  by mouth.      Cholecalciferol (VITAMIN D) 2000 UNIT Tab Take 2,000 Units by mouth every day.      Calcium Carbonate-Vit D-Min (CALCIUM 1200 PO) Take  by mouth. Calcium 2000 mg daily      HYDROcodone-acetaminophen (NORCO) 5-325 MG Tab per tablet Take 1-2 Tablets by mouth every 6 hours as needed (pain) for up to 7 days. (Patient not taking: Reported on 2023) 30 Tablet 0     No current facility-administered medications for this encounter.  "      ALLERGIES:    Macrobid [nitrofurantoin]    FAMILY HISTORY:    Family History   Problem Relation Age of Onset    Hypertension Mother     Diabetes Mother         typr II    Hyperlipidemia Mother     Arthritis Mother     Cancer Father         Lung, throat?    Arthritis Father         gout    Diabetes Brother        SOCIAL HISTORY:    Social History     Tobacco Use    Smoking status: Former     Packs/day: 1.00     Years: 5.00     Pack years: 5.00     Types: Cigarettes     Start date: 1974     Quit date: 1980     Years since quittin.0    Smokeless tobacco: Never   Vaping Use    Vaping Use: Never used   Substance Use Topics    Alcohol use: Yes     Alcohol/week: 1.2 oz     Types: 2 Glasses of wine per week     Comment: occ    Drug use: No     Patient is retired from .  Lives with: Self    REVIEW OF SYSTEMS:  A complete review of systems was completed in patient's chart on 2023.  All are negative with relationship to this diagnosis with the exception of:  Patient is healing well from surgery, does not have any suspicious lesions in the breast or axilla, denies any acute areas of bony tenderness or deformity, denies any new headaches or neurologic symptoms     PHYSICAL EXAM:    Vitals:    23 1018   BP: 136/85   BP Location: Right arm   Patient Position: Sitting   BP Cuff Size: Adult long   Pulse: (!) 108   SpO2: 95%   Weight: 84.9 kg (187 lb 2.7 oz)   Height: 1.575 m (5' 2\")   Pain Score: No pain        ECO= Fully active, able to carry on all pre-disease performance without restriction.    PAIN:  Patient reports no acute/ chronic pain    GENERAL: No apparent distress.  HEENT:  Pupils are equal, round, and reactive to light.  Extraocular muscles   are intact. Sclerae nonicteric.  Conjunctivae pink.  Oral cavity, tongue   protrudes midline.   NECK:  Supple without evidence of thyromegaly.  NODES:  No peripheral adenopathy of the neck, supraclavicular fossa or axillae   " bilaterally.  LUNGS:  Clear to ascultation bilaterally   HEART:  Regular rate and rhythm.  No murmur appreciated  BREAST: No suspicious lesions found in either breast or axilla.  ABDOMEN:  Soft. No evidence of hepatosplenomegaly.  Positive bowel sounds.  EXTREMITIES:  Without Edema.  NEUROLOGIC:  Cranial nerves II through XII were intact. Normal stance and gait motor and sensory grossly within normal limits       IMPRESSION:    Stage IA (T2N0M0) G1 invasive ductal carcinoma of the left upper outer quadrant of breast, ER/VA positive HER2/igor negative with a Ki-67 of less than 5%, status post lumpectomy and sentinel lymph node biopsy followed by reexcision completing on 7/21/2023, Oncotype score pending, planning on an aromatase inhibitor.      RECOMMENDATIONS:    I discussed the diagnosis, prognosis, and treatment options over a 1 hr 5 min time period, 95% of that time dedicated to ongoing treatment management.  I discussed with the patient and her son the early stage favorable features of her tumor.  However given the size of her tumor the role of Oncotype testing.  She understands the implication for systemic therapy.  Next we discussed the role of an aromatase inhibitor.  Next we discussed the data surrounding mastectomy versus lumpectomy and radiation.  We discussed the data for lumpectomy with and without radiation.  Finally we discussed more favorable subgroup with radiation omission.  Therefore she would be a candidate for either omission, whole breast radiation without a boost with hypofractionated treatment, or accelerated partial breast irradiation utilizing external beam radiation.  After discussing the pros and cons she felt comfortable with proceeding with radiation.  She felt currently more inclined to pursue whole breast radiation.  However she will give this some consideration between now and simulation.  She was given a simulation for August 28 at 11 AM.  She understands that if her Oncotype score  was high and she require chemotherapy it would go first.    We discussed the risks, benefits and side effects of treatment and the patient is amenable to treatment.  If patient has any questions or concerns, she should feel free to contact me.    Thank you for the opportunity to participate in her care.  If any questions or comments, please do not hesitate in calling.

## 2023-07-27 NOTE — CT SIMULATION
PATIENT NAME Lashae Hoff   PRIMARY PHYSICIAN Krista Narayan [id number]   REFERRING PHYSICIAN Jillian Goldsmith M.D. 1955     Malignant neoplasm of upper-outer quadrant of left breast in female, estrogen receptor positive (HCC)  Staging form: Breast, AJCC 8th Edition  - Pathologic stage from 7/5/2023: Stage IA (pT2, pN0, cM0, G1, ER+, [place]+, HER2-) - Signed by Monico Aguirre M.D. on 7/5/2023  Stage prefix: Initial diagnosis  Histologic grading system: 3 grade system         Treatment Planning CT Simulation        Order Questions       Question Answer Comment    Is this for a new course of treatment? Yes     Is this an Addendum? No     Implanted Device/Pacemaker No     Simulation Status Initial     Treatment Site Breast     Laterality Left     Treatment Technique 3D CRT     Other Technique(s) 3D     Treatment Pattern/Frequency Daily     Concurrent Chemotherapy No     CT Technique 3D DIBH possible     DIBH     Slice Thickness 3mm     Scan Extent Chest     Bowel Preparation No     Treatment Device(s) Vac Jen      Wing Board     Treatment Marker Scar     Patient Attire Gown     Patient Position Supine     Patient Orientation Head First     Arm Position Up     Treatment Machine No preference     Treatment Image Guidance Ports     Frequency (ports) Weekly     Other Orders Weekly Physics Check                   Comments       Left [place], candidate for APBI but currently leaning Whole Breast, Oncotype pending

## 2023-07-27 NOTE — PROGRESS NOTES
"Patient was seen today in clinic with Dr. Aguirre for consultation.  Vitals signs and weight were obtained and pain assessment was completed.  Allergies and medications were reviewed with the patient.      Vitals/Pain:  Vitals:    07/27/23 1018   BP: 136/85   BP Location: Right arm   Patient Position: Sitting   BP Cuff Size: Adult long   Pulse: (!) 108   SpO2: 95%   Weight: 84.9 kg (187 lb 2.7 oz)   Height: 1.575 m (5' 2\")   Pain Score: No pain        Allergies:   Macrobid [nitrofurantoin]    Current Medications:  Current Outpatient Medications   Medication Sig Dispense Refill    Multiple Vitamins-Minerals (PRESERVISION AREDS 2 PO) Take  by mouth.      Cholecalciferol (VITAMIN D) 2000 UNIT Tab Take 2,000 Units by mouth every day.      Calcium Carbonate-Vit D-Min (CALCIUM 1200 PO) Take  by mouth. Calcium 2000 mg daily      HYDROcodone-acetaminophen (NORCO) 5-325 MG Tab per tablet Take 1-2 Tablets by mouth every 6 hours as needed (pain) for up to 7 days. (Patient not taking: Reported on 7/27/2023) 30 Tablet 0     No current facility-administered medications for this encounter.         PCP:  Sylvain Raygoza R.N.   "

## 2023-08-01 ENCOUNTER — HOSPITAL ENCOUNTER (OUTPATIENT)
Dept: RADIATION ONCOLOGY | Facility: MEDICAL CENTER | Age: 68
End: 2023-08-31
Attending: RADIOLOGY
Payer: MEDICARE

## 2023-08-28 ENCOUNTER — HOSPITAL ENCOUNTER (OUTPATIENT)
Dept: RADIATION ONCOLOGY | Facility: MEDICAL CENTER | Age: 68
End: 2023-08-28

## 2023-08-28 PROCEDURE — 77290 THER RAD SIMULAJ FIELD CPLX: CPT | Performed by: RADIOLOGY

## 2023-08-28 PROCEDURE — 77334 RADIATION TREATMENT AID(S): CPT | Mod: 26 | Performed by: RADIOLOGY

## 2023-08-28 PROCEDURE — 77290 THER RAD SIMULAJ FIELD CPLX: CPT | Mod: 26 | Performed by: RADIOLOGY

## 2023-08-28 PROCEDURE — 77263 THER RADIOLOGY TX PLNG CPLX: CPT | Performed by: RADIOLOGY

## 2023-08-28 PROCEDURE — 77334 RADIATION TREATMENT AID(S): CPT | Performed by: RADIOLOGY

## 2023-08-28 NOTE — RADIATION PLANNING NOTES
DATE OF SERVICE: 8/28/2023    DIAGNOSIS:  Malignant neoplasm of upper-outer quadrant of left breast in female, estrogen receptor positive (HCC)  Staging form: Breast, AJCC 8th Edition  - Pathologic stage from 7/5/2023: Stage IA (pT2, pN0, cM0, G1, ER+, [place]+, HER2-) - Signed by Monico Aguirre M.D. on 7/5/2023  Stage prefix: Initial diagnosis  Histologic grading system: 3 grade system       DATE OF SERVICE: 8/28/2023    TYPE OF SIMULATION: Breast       [] Right    [x] Left      GOAL OF TREATMENT:   [x] Curative  [] Palliative  [] Oligometastatic    COMPLEX:  [x] Complex Blocking   []Arcs  [] Custom Blocks  [] >3 Sites    PROCEDURE: Patient placed in supine position on wing board with VAC LISA bag with appropriate slant to compensate for slope of chest wall.  Breast/chest wall borders marked CT scan obtained to contain entire volume of interest.      I have personally reviewed the relevant data, performed the target localization, and determined all relevant factors for this patient’s simulation.

## 2023-08-28 NOTE — RADIATION PLANNING NOTES
Clinical Treatment Planning Note    DATE OF SERVICE: 8/28/2023    DIAGNOSIS:  Malignant neoplasm of upper-outer quadrant of left breast in female, estrogen receptor positive (HCC)  Staging form: Breast, AJCC 8th Edition  - Pathologic stage from 7/5/2023: Stage IA (pT2, pN0, cM0, G1, ER+, [place]+, HER2-) - Signed by Monico Aguirre M.D. on 7/5/2023  Stage prefix: Initial diagnosis  Histologic grading system: 3 grade system         IMAGING REVIEWED:  [x] CT     [] MRI     [] PET/CT     [] BONE SCAN     [x] MAMMO     [] OTHER      TREATMENT INTENT:   [x] CURATIVE     [] MAINTENANCE     []  PALLIATIVE      []  SUPPORTIVE     []  PROPHYLACTIC     [] BENIGN     []  CONSOLIDATIVE      [] DEFINITIVE   []  OLOGIMETASTATIC      LINE OF TREATMENT:  [x] ADJUVANT   [] DEFINITIVE   [] NEOADJUVANT   [] RE-TREATMENT      TECHNIQUE PLANNED:  [] IMRT   [x] 3D   [] SBRT   [] SRS/SRT   [] HDR   [] ELECTRON       IMRT JUSTIFICATION:  []   An immediately adjacent area has been previously irradiated and abutting portals must be established with high precision.    []  Dose escalation is planned to deliver radiation doses in excess of those commonly utilized for similar tumors with conventional treatment.    []  The target volume is concave or convex, and the critical normal tissues are within or around that convexity or concavity.    []  The target volume is in close proximity to critical structures that must be protected.    []  The volume of interest must be covered with narrow margins to adequately protect  immediately adjacent structures.      FIELDS & BLOCKING:  [x] COMPLEX BLOCKS     []  = 3 TX AREAS     []  ARCS     []  CUSTOM SHEILD        []  SIMPLE BLOCK      CHEMOTHERAPY:  []  CONCURRENT     []  INDUCTION     [] SEQUENTIAL     []  <30 DAYS FROM XRT      NOTES:    OAR CONSTRAINTS: (GUIDELINES ONLY NOT ABSOLUTE)    Target Prescribed Coverage   PTV 95% of PTV covered by 95% (cGy) of RX Dose       ALBERTO Goal   Max point dose PTV Eval  <=110%   Contralateral Breast V5% < 2Gy   Ipsilateral Lung V15% < 20Gy   Ipsilateral Lung V35% < 10Gy   Ipsilateral Lung V50% < 5Gy   Contralateral Lung V10% < 5Gy   Heart (L Sided) V5% < 20Gy   *RTOG 1005, START-A, START-B

## 2023-08-31 PROCEDURE — 77295 3-D RADIOTHERAPY PLAN: CPT | Mod: 26 | Performed by: RADIOLOGY

## 2023-08-31 PROCEDURE — 77300 RADIATION THERAPY DOSE PLAN: CPT | Performed by: RADIOLOGY

## 2023-08-31 PROCEDURE — 77300 RADIATION THERAPY DOSE PLAN: CPT | Mod: 26 | Performed by: RADIOLOGY

## 2023-08-31 PROCEDURE — 77334 RADIATION TREATMENT AID(S): CPT | Performed by: RADIOLOGY

## 2023-08-31 PROCEDURE — 77295 3-D RADIOTHERAPY PLAN: CPT | Performed by: RADIOLOGY

## 2023-08-31 PROCEDURE — 77334 RADIATION TREATMENT AID(S): CPT | Mod: 26 | Performed by: RADIOLOGY

## 2023-09-01 ENCOUNTER — HOSPITAL ENCOUNTER (OUTPATIENT)
Dept: RADIATION ONCOLOGY | Facility: MEDICAL CENTER | Age: 68
End: 2023-09-30
Attending: RADIOLOGY
Payer: MEDICARE

## 2023-09-05 ENCOUNTER — HOSPITAL ENCOUNTER (OUTPATIENT)
Dept: RADIATION ONCOLOGY | Facility: MEDICAL CENTER | Age: 68
End: 2023-09-05

## 2023-09-05 LAB
CHEMOTHERAPY INFUSION START DATE: NORMAL
CHEMOTHERAPY RECORDS: 2.67
CHEMOTHERAPY RECORDS: 4005
CHEMOTHERAPY RECORDS: NORMAL
CHEMOTHERAPY RX CANCER: NORMAL
DATE 1ST CHEMO CANCER: NORMAL
RAD ONC ARIA COURSE LAST TREATMENT DATE: NORMAL
RAD ONC ARIA COURSE TREATMENT ELAPSED DAYS: NORMAL
RAD ONC ARIA REFERENCE POINT DOSAGE GIVEN TO DATE: 2.67
RAD ONC ARIA REFERENCE POINT DOSAGE GIVEN TO DATE: 2.67
RAD ONC ARIA REFERENCE POINT ID: NORMAL
RAD ONC ARIA REFERENCE POINT ID: NORMAL
RAD ONC ARIA REFERENCE POINT SESSION DOSAGE GIVEN: 2.67
RAD ONC ARIA REFERENCE POINT SESSION DOSAGE GIVEN: 2.67

## 2023-09-05 PROCEDURE — 77280 THER RAD SIMULAJ FIELD SMPL: CPT | Mod: 26 | Performed by: RADIOLOGY

## 2023-09-05 PROCEDURE — 77280 THER RAD SIMULAJ FIELD SMPL: CPT | Performed by: RADIOLOGY

## 2023-09-05 PROCEDURE — 77412 RADIATION TX DELIVERY LVL 3: CPT | Performed by: RADIOLOGY

## 2023-09-05 NOTE — CT SIMULATION
DATE OF SERVICE: 9/5/2023    Radiation Therapy Episodes       Active Episodes       Radiation Therapy: 3D CRT                   Radiation Treatments         Plan Last Treated On Elapsed Days Fractions Treated Prescribed Fraction Dose (cGy) Prescribed Total Dose (cGy)    L_Breast_DIB 9/5/2023 0 @ [id number] 1 of 15 267 4,005                  Reference Point Last Treated On Elapsed Days Most Recent Session Dose (cGy) Total Dose (cGy)    L_Breast_DIB 9/5/2023 0 @ [id number] 267 267    [facility] @ [id number] 267 267                            First Visit Simple Simulation: Called by Truebeam machine to verify treatment parameters including:  treatment site, treatment dose, and treatment setup prior to first treatment. Image derived shifts reviewed in all appropriate planes.  Shifts approved.  Patient treated.    I have personally reviewed the relevant data, performed the target localization, and determined all relevant factors for this patient’s simulation.

## 2023-09-06 ENCOUNTER — HOSPITAL ENCOUNTER (OUTPATIENT)
Dept: RADIATION ONCOLOGY | Facility: MEDICAL CENTER | Age: 68
End: 2023-09-06
Payer: MEDICARE

## 2023-09-06 VITALS
BODY MASS INDEX: 34.23 KG/M2 | DIASTOLIC BLOOD PRESSURE: 84 MMHG | SYSTOLIC BLOOD PRESSURE: 123 MMHG | WEIGHT: 187.17 LBS | HEART RATE: 86 BPM | OXYGEN SATURATION: 95 %

## 2023-09-06 LAB
CHEMOTHERAPY INFUSION START DATE: NORMAL
CHEMOTHERAPY RECORDS: 2.67
CHEMOTHERAPY RECORDS: 4005
CHEMOTHERAPY RECORDS: NORMAL
CHEMOTHERAPY RX CANCER: NORMAL
DATE 1ST CHEMO CANCER: NORMAL
RAD ONC ARIA COURSE LAST TREATMENT DATE: NORMAL
RAD ONC ARIA COURSE TREATMENT ELAPSED DAYS: NORMAL
RAD ONC ARIA REFERENCE POINT DOSAGE GIVEN TO DATE: 5.34
RAD ONC ARIA REFERENCE POINT DOSAGE GIVEN TO DATE: 5.34
RAD ONC ARIA REFERENCE POINT ID: NORMAL
RAD ONC ARIA REFERENCE POINT ID: NORMAL
RAD ONC ARIA REFERENCE POINT SESSION DOSAGE GIVEN: 2.67
RAD ONC ARIA REFERENCE POINT SESSION DOSAGE GIVEN: 2.67

## 2023-09-06 PROCEDURE — 77417 THER RADIOLOGY PORT IMAGE(S): CPT | Performed by: RADIOLOGY

## 2023-09-06 PROCEDURE — 77412 RADIATION TX DELIVERY LVL 3: CPT | Performed by: RADIOLOGY

## 2023-09-06 ASSESSMENT — FIBROSIS 4 INDEX: FIB4 SCORE: 0.8

## 2023-09-06 ASSESSMENT — PAIN SCALES - GENERAL: PAINLEVEL: NO PAIN

## 2023-09-06 NOTE — ON TREATMENT VISIT
ON TREATMENT NOTE  RADIATION ONCOLOGY DEPARTMENT    Patient name:  Lashae Hoff    Primary Physician:  Krista Narayan M.D. MRN: [id number]  [facility]: [id number]   Referring physician:  Jillian Goldsmith M.D. : 1955, 68 y.o.     ENCOUNTER DATE:  23    DIAGNOSIS:    Malignant neoplasm of upper-outer quadrant of left breast in female, estrogen receptor positive (HCC)  Staging form: Breast, AJCC 8th Edition  - Pathologic stage from 2023: Stage IA (pT2, pN0, cM0, G1, ER+, [place]+, HER2-) - Signed by Monico Aguirre M.D. on 2023  Stage prefix: Initial diagnosis  Histologic grading system: 3 grade system      TREATMENT SUMMARY:  Aria Treatment Information          2023   Aria Course Treatment Dates   Course First Treatment Date 2023    Course Last Treatment Date 2023    Aria Treatment Summary   L_Breast_DIBH  Plan from Course C1_LBreast   Fraction 2 of 15   Elapsed Course Days 1 @ [id number]   Prescribed Fraction Dose 267 cGy   Prescribed Total Dose 4,005 cGy   L_Breast_DIBH  Reference Point from Course C1_LBreast   Elapsed Course Days 1 @ [id number]   Session Dose 267 cGy   Total Dose 534 cGy   L_Breast_DIBH CP  Reference Point from Course C1_LBreast   Elapsed Course Days 1 @ [id number]   Session Dose 267 cGy   Total Dose 534 cGy      Radiation Treatments       Active   Plans   L_Breast_DIBH   Most recent treatment: Dose planned: 267 cGy (fraction 2 of 15 on 2023)   Total: Dose planned: 4,005 cGy   Elapsed Days: 1 @            Historical   No historical radiation treatments to show.               SUBJECTIVE:   Patient is doing well.  She does not have any changes she would attribute to her radiation.    VITAL SIGNS:    Encounter Vitals  Blood Pressure : 123/84  Pulse: 86  Pulse Oximetry: 95 %  Weight: 84.9 kg (187 lb 2.7 oz)  Pain Score: No pain      2023    10:23 AM 2023    10:18 AM   Pain Assessment   Pain Score NO PAIN NO PAIN           PHYSICAL EXAM:  No erythema    TOXICITY      9/6/2023    10:25 AM   Toxicity Assessment   Toxicity Assessment Breast   Fatigue (lethargy, malaise, asthenia) None   Fever (in the absence of neutropenia) None   Radiation Dermatitis None   Lymphatics Normal   RT - Pain due to RT None   Dyspnea Normal         IMPRESSION:  Cancer Staging   Malignant neoplasm of upper-outer quadrant of left breast in female, estrogen receptor positive (HCC)  Staging form: Breast, AJCC 8th Edition  - Pathologic stage from 7/5/2023: Stage IA (pT2, pN0, cM0, G1, ER+, [place]+, HER2-) - Signed by Monico Aguirre M.D. on 7/5/2023      PLAN:  No change in treatment plan    Disposition:  Treatment plan reviewed. Questions answered. Continue therapy outlined.     Monico Aguirre M.D.    No orders of the defined types were placed in this encounter.

## 2023-09-07 ENCOUNTER — HOSPITAL ENCOUNTER (OUTPATIENT)
Dept: RADIATION ONCOLOGY | Facility: MEDICAL CENTER | Age: 68
End: 2023-09-07
Payer: MEDICARE

## 2023-09-07 LAB
CHEMOTHERAPY INFUSION START DATE: NORMAL
CHEMOTHERAPY RECORDS: 2.67
CHEMOTHERAPY RECORDS: 4005
CHEMOTHERAPY RECORDS: NORMAL
CHEMOTHERAPY RX CANCER: NORMAL
DATE 1ST CHEMO CANCER: NORMAL
RAD ONC ARIA COURSE LAST TREATMENT DATE: NORMAL
RAD ONC ARIA COURSE TREATMENT ELAPSED DAYS: NORMAL
RAD ONC ARIA REFERENCE POINT DOSAGE GIVEN TO DATE: 8.01
RAD ONC ARIA REFERENCE POINT DOSAGE GIVEN TO DATE: 8.01
RAD ONC ARIA REFERENCE POINT ID: NORMAL
RAD ONC ARIA REFERENCE POINT ID: NORMAL
RAD ONC ARIA REFERENCE POINT SESSION DOSAGE GIVEN: 2.67
RAD ONC ARIA REFERENCE POINT SESSION DOSAGE GIVEN: 2.67

## 2023-09-07 PROCEDURE — 77336 RADIATION PHYSICS CONSULT: CPT | Performed by: RADIOLOGY

## 2023-09-07 PROCEDURE — 77412 RADIATION TX DELIVERY LVL 3: CPT | Performed by: RADIOLOGY

## 2023-09-08 ENCOUNTER — HOSPITAL ENCOUNTER (OUTPATIENT)
Dept: RADIATION ONCOLOGY | Facility: MEDICAL CENTER | Age: 68
End: 2023-09-08
Payer: MEDICARE

## 2023-09-08 LAB
CHEMOTHERAPY INFUSION START DATE: NORMAL
CHEMOTHERAPY RECORDS: 2.67
CHEMOTHERAPY RECORDS: 4005
CHEMOTHERAPY RECORDS: NORMAL
CHEMOTHERAPY RX CANCER: NORMAL
DATE 1ST CHEMO CANCER: NORMAL
RAD ONC ARIA COURSE LAST TREATMENT DATE: NORMAL
RAD ONC ARIA COURSE TREATMENT ELAPSED DAYS: NORMAL
RAD ONC ARIA REFERENCE POINT DOSAGE GIVEN TO DATE: 10.68
RAD ONC ARIA REFERENCE POINT DOSAGE GIVEN TO DATE: 10.68
RAD ONC ARIA REFERENCE POINT ID: NORMAL
RAD ONC ARIA REFERENCE POINT ID: NORMAL
RAD ONC ARIA REFERENCE POINT SESSION DOSAGE GIVEN: 2.67
RAD ONC ARIA REFERENCE POINT SESSION DOSAGE GIVEN: 2.67

## 2023-09-08 PROCEDURE — 77412 RADIATION TX DELIVERY LVL 3: CPT | Performed by: RADIOLOGY

## 2023-09-11 ENCOUNTER — HOSPITAL ENCOUNTER (OUTPATIENT)
Dept: RADIATION ONCOLOGY | Facility: MEDICAL CENTER | Age: 68
End: 2023-09-11
Payer: MEDICARE

## 2023-09-11 LAB
CHEMOTHERAPY INFUSION START DATE: NORMAL
CHEMOTHERAPY RECORDS: 2.67
CHEMOTHERAPY RECORDS: 4005
CHEMOTHERAPY RECORDS: NORMAL
CHEMOTHERAPY RX CANCER: NORMAL
DATE 1ST CHEMO CANCER: NORMAL
RAD ONC ARIA COURSE LAST TREATMENT DATE: NORMAL
RAD ONC ARIA COURSE TREATMENT ELAPSED DAYS: NORMAL
RAD ONC ARIA REFERENCE POINT DOSAGE GIVEN TO DATE: 13.35
RAD ONC ARIA REFERENCE POINT DOSAGE GIVEN TO DATE: 13.35
RAD ONC ARIA REFERENCE POINT ID: NORMAL
RAD ONC ARIA REFERENCE POINT ID: NORMAL
RAD ONC ARIA REFERENCE POINT SESSION DOSAGE GIVEN: 2.67
RAD ONC ARIA REFERENCE POINT SESSION DOSAGE GIVEN: 2.67

## 2023-09-11 PROCEDURE — 77417 THER RADIOLOGY PORT IMAGE(S): CPT | Performed by: RADIOLOGY

## 2023-09-11 PROCEDURE — 77427 RADIATION TX MANAGEMENT X5: CPT | Performed by: RADIOLOGY

## 2023-09-11 PROCEDURE — 77412 RADIATION TX DELIVERY LVL 3: CPT | Performed by: RADIOLOGY

## 2023-09-12 ENCOUNTER — HOSPITAL ENCOUNTER (OUTPATIENT)
Dept: RADIATION ONCOLOGY | Facility: MEDICAL CENTER | Age: 68
End: 2023-09-12
Payer: MEDICARE

## 2023-09-12 LAB
CHEMOTHERAPY INFUSION START DATE: NORMAL
CHEMOTHERAPY RECORDS: 2.67
CHEMOTHERAPY RECORDS: 4005
CHEMOTHERAPY RECORDS: NORMAL
CHEMOTHERAPY RX CANCER: NORMAL
DATE 1ST CHEMO CANCER: NORMAL
RAD ONC ARIA COURSE LAST TREATMENT DATE: NORMAL
RAD ONC ARIA COURSE TREATMENT ELAPSED DAYS: NORMAL
RAD ONC ARIA REFERENCE POINT DOSAGE GIVEN TO DATE: 16.02
RAD ONC ARIA REFERENCE POINT DOSAGE GIVEN TO DATE: 16.02
RAD ONC ARIA REFERENCE POINT ID: NORMAL
RAD ONC ARIA REFERENCE POINT ID: NORMAL
RAD ONC ARIA REFERENCE POINT SESSION DOSAGE GIVEN: 2.67
RAD ONC ARIA REFERENCE POINT SESSION DOSAGE GIVEN: 2.67

## 2023-09-12 PROCEDURE — 77412 RADIATION TX DELIVERY LVL 3: CPT | Performed by: RADIOLOGY

## 2023-09-13 ENCOUNTER — HOSPITAL ENCOUNTER (OUTPATIENT)
Dept: RADIATION ONCOLOGY | Facility: MEDICAL CENTER | Age: 68
End: 2023-09-13
Payer: MEDICARE

## 2023-09-13 VITALS
SYSTOLIC BLOOD PRESSURE: 119 MMHG | BODY MASS INDEX: 34.31 KG/M2 | HEART RATE: 89 BPM | WEIGHT: 187.61 LBS | OXYGEN SATURATION: 95 % | DIASTOLIC BLOOD PRESSURE: 77 MMHG

## 2023-09-13 LAB
CHEMOTHERAPY INFUSION START DATE: NORMAL
CHEMOTHERAPY RECORDS: 2.67
CHEMOTHERAPY RECORDS: 4005
CHEMOTHERAPY RECORDS: NORMAL
CHEMOTHERAPY RX CANCER: NORMAL
DATE 1ST CHEMO CANCER: NORMAL
RAD ONC ARIA COURSE LAST TREATMENT DATE: NORMAL
RAD ONC ARIA COURSE TREATMENT ELAPSED DAYS: NORMAL
RAD ONC ARIA REFERENCE POINT DOSAGE GIVEN TO DATE: 18.69
RAD ONC ARIA REFERENCE POINT DOSAGE GIVEN TO DATE: 18.69
RAD ONC ARIA REFERENCE POINT ID: NORMAL
RAD ONC ARIA REFERENCE POINT ID: NORMAL
RAD ONC ARIA REFERENCE POINT SESSION DOSAGE GIVEN: 2.67
RAD ONC ARIA REFERENCE POINT SESSION DOSAGE GIVEN: 2.67

## 2023-09-13 PROCEDURE — 77412 RADIATION TX DELIVERY LVL 3: CPT | Performed by: RADIOLOGY

## 2023-09-13 ASSESSMENT — PAIN SCALES - GENERAL: PAINLEVEL: NO PAIN

## 2023-09-13 ASSESSMENT — FIBROSIS 4 INDEX: FIB4 SCORE: 0.8

## 2023-09-13 NOTE — ON TREATMENT VISIT
ON TREATMENT NOTE  RADIATION ONCOLOGY DEPARTMENT    Patient name:  Lashae Hoff    Primary Physician:  Krista Narayan M.D. MRN: [id number]  [facility]: [id number]   Referring physician:  Jillian Goldsmith M.D. : 1955, 68 y.o.     ENCOUNTER DATE:  23    DIAGNOSIS:    Malignant neoplasm of upper-outer quadrant of left breast in female, estrogen receptor positive (HCC)  Staging form: Breast, AJCC 8th Edition  - Pathologic stage from 2023: Stage IA (pT2, pN0, cM0, G1, ER+, [place]+, HER2-) - Signed by Monico Aguirre M.D. on 2023  Stage prefix: Initial diagnosis  Histologic grading system: 3 grade system      TREATMENT SUMMARY:  Aria Treatment Information          2023   Aria Course Treatment Dates   Course First Treatment Date 2023    Course Last Treatment Date 2023    Aria Treatment Summary   L_Breast_DIBH  Plan from Course C1_LBreast   Fraction 7 of 15   Elapsed Course Days 8 @ [id number]   Prescribed Fraction Dose 267 cGy   Prescribed Total Dose 4,005 cGy   L_Breast_DIBH  Reference Point from Course C1_LBreast   Elapsed Course Days 8 @ [id number]   Session Dose 267 cGy   Total Dose 1,869 cGy   L_Breast_DIBH CP  Reference Point from Course C1_LBreast   Elapsed Course Days 8 @ [id number]   Session Dose 267 cGy   Total Dose 1,869 cGy      Radiation Treatments       Active   Plans   L_Breast_DIBH   Most recent treatment: Dose planned: 267 cGy (fraction 7 of 15 on 2023)   Total: Dose planned: 4,005 cGy   Elapsed Days: 8 @ [id number]           Historical   No historical radiation treatments to show.               SUBJECTIVE:   Patient is doing well.  She does not yet exhibit any erythema.    VITAL SIGNS:    Encounter Vitals  Blood Pressure : 119/77  Pulse: 89  Pulse Oximetry: 95 %  Weight: 85.1 kg (187 lb 9.8 oz)  Pain Score: No pain      2023    10:11 AM 2023    10:23 AM 2023    10:18 AM   Pain Assessment   Pain Score NO PAIN NO PAIN NO PAIN           PHYSICAL EXAM:  No erythema    TOXICITY      9/13/2023    10:12 AM 9/6/2023    10:25 AM   Toxicity Assessment   Toxicity Assessment Breast Breast   Fatigue (lethargy, malaise, asthenia) None None   Fever (in the absence of neutropenia) None None   Radiation Dermatitis None None   Lymphatics Normal Normal   RT - Pain due to RT None None   Dyspnea Normal Normal         IMPRESSION:  Cancer Staging   Malignant neoplasm of upper-outer quadrant of left breast in female, estrogen receptor positive (HCC)  Staging form: Breast, AJCC 8th Edition  - Pathologic stage from 7/5/2023: Stage IA (pT2, pN0, cM0, G1, ER+, [place]+, HER2-) - Signed by Monico Aguirre M.D. on 7/5/2023      PLAN:  No change in treatment plan    Disposition:  Treatment plan reviewed. Questions answered. Continue therapy outlined.     Monico Aguirre M.D.    No orders of the defined types were placed in this encounter.

## 2023-09-14 ENCOUNTER — HOSPITAL ENCOUNTER (OUTPATIENT)
Dept: RADIATION ONCOLOGY | Facility: MEDICAL CENTER | Age: 68
End: 2023-09-14
Payer: MEDICARE

## 2023-09-14 LAB
CHEMOTHERAPY INFUSION START DATE: NORMAL
CHEMOTHERAPY RECORDS: 2.67
CHEMOTHERAPY RECORDS: 4005
CHEMOTHERAPY RECORDS: NORMAL
CHEMOTHERAPY RX CANCER: NORMAL
DATE 1ST CHEMO CANCER: NORMAL
RAD ONC ARIA COURSE LAST TREATMENT DATE: NORMAL
RAD ONC ARIA COURSE TREATMENT ELAPSED DAYS: NORMAL
RAD ONC ARIA REFERENCE POINT DOSAGE GIVEN TO DATE: 21.36
RAD ONC ARIA REFERENCE POINT DOSAGE GIVEN TO DATE: 21.36
RAD ONC ARIA REFERENCE POINT ID: NORMAL
RAD ONC ARIA REFERENCE POINT ID: NORMAL
RAD ONC ARIA REFERENCE POINT SESSION DOSAGE GIVEN: 2.67
RAD ONC ARIA REFERENCE POINT SESSION DOSAGE GIVEN: 2.67

## 2023-09-14 PROCEDURE — 77412 RADIATION TX DELIVERY LVL 3: CPT | Performed by: RADIOLOGY

## 2023-09-14 PROCEDURE — 77336 RADIATION PHYSICS CONSULT: CPT | Performed by: RADIOLOGY

## 2023-09-15 ENCOUNTER — HOSPITAL ENCOUNTER (OUTPATIENT)
Dept: RADIATION ONCOLOGY | Facility: MEDICAL CENTER | Age: 68
End: 2023-09-15
Payer: MEDICARE

## 2023-09-15 LAB
CHEMOTHERAPY INFUSION START DATE: NORMAL
CHEMOTHERAPY RECORDS: 2.67
CHEMOTHERAPY RECORDS: 4005
CHEMOTHERAPY RECORDS: NORMAL
CHEMOTHERAPY RX CANCER: NORMAL
DATE 1ST CHEMO CANCER: NORMAL
RAD ONC ARIA COURSE LAST TREATMENT DATE: NORMAL
RAD ONC ARIA COURSE TREATMENT ELAPSED DAYS: NORMAL
RAD ONC ARIA REFERENCE POINT DOSAGE GIVEN TO DATE: 24.03
RAD ONC ARIA REFERENCE POINT DOSAGE GIVEN TO DATE: 24.03
RAD ONC ARIA REFERENCE POINT ID: NORMAL
RAD ONC ARIA REFERENCE POINT ID: NORMAL
RAD ONC ARIA REFERENCE POINT SESSION DOSAGE GIVEN: 2.67
RAD ONC ARIA REFERENCE POINT SESSION DOSAGE GIVEN: 2.67

## 2023-09-15 PROCEDURE — 77412 RADIATION TX DELIVERY LVL 3: CPT | Performed by: RADIOLOGY

## 2023-09-18 ENCOUNTER — HOSPITAL ENCOUNTER (OUTPATIENT)
Dept: RADIATION ONCOLOGY | Facility: MEDICAL CENTER | Age: 68
End: 2023-09-18
Payer: MEDICARE

## 2023-09-18 LAB
CHEMOTHERAPY INFUSION START DATE: NORMAL
CHEMOTHERAPY RECORDS: 2.67
CHEMOTHERAPY RECORDS: 4005
CHEMOTHERAPY RECORDS: NORMAL
CHEMOTHERAPY RX CANCER: NORMAL
DATE 1ST CHEMO CANCER: NORMAL
RAD ONC ARIA COURSE LAST TREATMENT DATE: NORMAL
RAD ONC ARIA COURSE TREATMENT ELAPSED DAYS: NORMAL
RAD ONC ARIA REFERENCE POINT DOSAGE GIVEN TO DATE: 26.7
RAD ONC ARIA REFERENCE POINT DOSAGE GIVEN TO DATE: 26.7
RAD ONC ARIA REFERENCE POINT ID: NORMAL
RAD ONC ARIA REFERENCE POINT ID: NORMAL
RAD ONC ARIA REFERENCE POINT SESSION DOSAGE GIVEN: 2.67
RAD ONC ARIA REFERENCE POINT SESSION DOSAGE GIVEN: 2.67

## 2023-09-18 PROCEDURE — 77427 RADIATION TX MANAGEMENT X5: CPT | Performed by: RADIOLOGY

## 2023-09-18 PROCEDURE — 77412 RADIATION TX DELIVERY LVL 3: CPT | Performed by: RADIOLOGY

## 2023-09-19 ENCOUNTER — HOSPITAL ENCOUNTER (OUTPATIENT)
Dept: RADIATION ONCOLOGY | Facility: MEDICAL CENTER | Age: 68
End: 2023-09-19
Payer: MEDICARE

## 2023-09-19 LAB
CHEMOTHERAPY INFUSION START DATE: NORMAL
CHEMOTHERAPY RECORDS: 2.67
CHEMOTHERAPY RECORDS: 4005
CHEMOTHERAPY RECORDS: NORMAL
CHEMOTHERAPY RX CANCER: NORMAL
DATE 1ST CHEMO CANCER: NORMAL
RAD ONC ARIA COURSE LAST TREATMENT DATE: NORMAL
RAD ONC ARIA COURSE TREATMENT ELAPSED DAYS: NORMAL
RAD ONC ARIA REFERENCE POINT DOSAGE GIVEN TO DATE: 29.37
RAD ONC ARIA REFERENCE POINT DOSAGE GIVEN TO DATE: 29.37
RAD ONC ARIA REFERENCE POINT ID: NORMAL
RAD ONC ARIA REFERENCE POINT ID: NORMAL
RAD ONC ARIA REFERENCE POINT SESSION DOSAGE GIVEN: 2.67
RAD ONC ARIA REFERENCE POINT SESSION DOSAGE GIVEN: 2.67

## 2023-09-19 PROCEDURE — 77417 THER RADIOLOGY PORT IMAGE(S): CPT | Performed by: RADIOLOGY

## 2023-09-19 PROCEDURE — 77412 RADIATION TX DELIVERY LVL 3: CPT | Performed by: RADIOLOGY

## 2023-09-20 ENCOUNTER — HOSPITAL ENCOUNTER (OUTPATIENT)
Dept: RADIATION ONCOLOGY | Facility: MEDICAL CENTER | Age: 68
End: 2023-09-20
Payer: MEDICARE

## 2023-09-20 VITALS
DIASTOLIC BLOOD PRESSURE: 87 MMHG | OXYGEN SATURATION: 95 % | BODY MASS INDEX: 34.4 KG/M2 | WEIGHT: 188.05 LBS | HEART RATE: 86 BPM | SYSTOLIC BLOOD PRESSURE: 146 MMHG

## 2023-09-20 LAB
CHEMOTHERAPY INFUSION START DATE: NORMAL
CHEMOTHERAPY RECORDS: 2.67
CHEMOTHERAPY RECORDS: 4005
CHEMOTHERAPY RECORDS: NORMAL
CHEMOTHERAPY RX CANCER: NORMAL
DATE 1ST CHEMO CANCER: NORMAL
RAD ONC ARIA COURSE LAST TREATMENT DATE: NORMAL
RAD ONC ARIA COURSE TREATMENT ELAPSED DAYS: NORMAL
RAD ONC ARIA REFERENCE POINT DOSAGE GIVEN TO DATE: 32.04
RAD ONC ARIA REFERENCE POINT DOSAGE GIVEN TO DATE: 32.04
RAD ONC ARIA REFERENCE POINT ID: NORMAL
RAD ONC ARIA REFERENCE POINT ID: NORMAL
RAD ONC ARIA REFERENCE POINT SESSION DOSAGE GIVEN: 2.67
RAD ONC ARIA REFERENCE POINT SESSION DOSAGE GIVEN: 2.67

## 2023-09-20 PROCEDURE — 77412 RADIATION TX DELIVERY LVL 3: CPT | Performed by: RADIOLOGY

## 2023-09-20 ASSESSMENT — PAIN SCALES - GENERAL: PAINLEVEL: NO PAIN

## 2023-09-20 ASSESSMENT — FIBROSIS 4 INDEX: FIB4 SCORE: 0.8

## 2023-09-20 NOTE — ON TREATMENT VISIT
ON TREATMENT NOTE  RADIATION ONCOLOGY DEPARTMENT    Patient name:  Lashae Hoff    Primary Physician:  Krista Narayan M.D. MRN: [id number]  [facility]: [id number]   Referring physician:  Jillian Goldsmith M.D. : 1955, 68 y.o.     ENCOUNTER DATE:  23    DIAGNOSIS:    Malignant neoplasm of upper-outer quadrant of left breast in female, estrogen receptor positive (HCC)  Staging form: Breast, AJCC 8th Edition  - Pathologic stage from 2023: Stage IA (pT2, pN0, cM0, G1, ER+, [place]+, HER2-) - Signed by Monico Aguirre M.D. on 2023  Stage prefix: Initial diagnosis  Histologic grading system: 3 grade system      TREATMENT SUMMARY:  Aria Treatment Information          2023   Aria Course Treatment Dates   Course First Treatment Date 2023    Course Last Treatment Date 2023    Aria Treatment Summary   L_Breast_DIBH  Plan from Course C1_LBreast   Fraction 12 of 15   Elapsed Course Days 15 @    Prescribed Fraction Dose 267 cGy   Prescribed Total Dose 4,005 cGy   L_Breast_DIBH  Reference Point from Course C1_LBreast   Elapsed Course Days 15 @    Session Dose 267 cGy   Total Dose 3,204 cGy   L_Breast_DIBH CP  Reference Point from Course C1_LBreast   Elapsed Course Days 15 @    Session Dose 267 cGy   Total Dose 3,204 cGy      Radiation Treatments       Active   Plans   L_Breast_DIBH   Most recent treatment: Dose planned: 267 cGy (fraction 12 of 15 on 2023)   Total: Dose planned: 4,005 cGy   Elapsed Days: 15 @            Historical   No historical radiation treatments to show.               SUBJECTIVE:   Patient is doing well.  She only has mild to no erythema.    VITAL SIGNS:    Encounter Vitals  Blood Pressure : (!) 146/87  Pulse: 86  Pulse Oximetry: 95 %  Weight: 85.3 kg (188 lb 0.8 oz)  Pain Score: No pain      2023    10:36 AM 2023    10:11 AM 2023    10:23 AM 2023    10:18 AM   Pain Assessment   Pain  Score NO PAIN NO PAIN NO PAIN NO PAIN          PHYSICAL EXAM:  Mild erythema in the treatment field    TOXICITY      9/20/2023    10:36 AM 9/13/2023    10:12 AM 9/6/2023    10:25 AM   Toxicity Assessment   Toxicity Assessment Breast Breast Breast   Fatigue (lethargy, malaise, asthenia) None None None   Fever (in the absence of neutropenia) None None None   Radiation Dermatitis None None None   Lymphatics Normal Normal Normal   RT - Pain due to RT None None None   Dyspnea Normal Normal Normal         IMPRESSION:  Cancer Staging   Malignant neoplasm of upper-outer quadrant of left breast in female, estrogen receptor positive (HCC)  Staging form: Breast, AJCC 8th Edition  - Pathologic stage from 7/5/2023: Stage IA (pT2, pN0, cM0, G1, ER+, [place]+, HER2-) - Signed by Monico Aguirre M.D. on 7/5/2023      PLAN:  No change in treatment plan    Disposition:  Treatment plan reviewed. Questions answered. Continue therapy outlined.     Monico Aguirre M.D.    No orders of the defined types were placed in this encounter.

## 2023-09-21 ENCOUNTER — HOSPITAL ENCOUNTER (OUTPATIENT)
Dept: RADIATION ONCOLOGY | Facility: MEDICAL CENTER | Age: 68
End: 2023-09-21
Payer: MEDICARE

## 2023-09-21 LAB
CHEMOTHERAPY INFUSION START DATE: NORMAL
CHEMOTHERAPY RECORDS: 2.67
CHEMOTHERAPY RECORDS: 4005
CHEMOTHERAPY RECORDS: NORMAL
CHEMOTHERAPY RX CANCER: NORMAL
DATE 1ST CHEMO CANCER: NORMAL
RAD ONC ARIA COURSE LAST TREATMENT DATE: NORMAL
RAD ONC ARIA COURSE TREATMENT ELAPSED DAYS: NORMAL
RAD ONC ARIA REFERENCE POINT DOSAGE GIVEN TO DATE: 34.71
RAD ONC ARIA REFERENCE POINT DOSAGE GIVEN TO DATE: 34.71
RAD ONC ARIA REFERENCE POINT ID: NORMAL
RAD ONC ARIA REFERENCE POINT ID: NORMAL
RAD ONC ARIA REFERENCE POINT SESSION DOSAGE GIVEN: 2.67
RAD ONC ARIA REFERENCE POINT SESSION DOSAGE GIVEN: 2.67

## 2023-09-21 PROCEDURE — 77412 RADIATION TX DELIVERY LVL 3: CPT | Performed by: RADIOLOGY

## 2023-09-21 PROCEDURE — 77336 RADIATION PHYSICS CONSULT: CPT | Performed by: RADIOLOGY

## 2023-09-22 ENCOUNTER — HOSPITAL ENCOUNTER (OUTPATIENT)
Dept: RADIATION ONCOLOGY | Facility: MEDICAL CENTER | Age: 68
End: 2023-09-22
Payer: MEDICARE

## 2023-09-22 LAB
CHEMOTHERAPY INFUSION START DATE: NORMAL
CHEMOTHERAPY RECORDS: 2.67
CHEMOTHERAPY RECORDS: 4005
CHEMOTHERAPY RECORDS: NORMAL
CHEMOTHERAPY RX CANCER: NORMAL
DATE 1ST CHEMO CANCER: NORMAL
RAD ONC ARIA COURSE LAST TREATMENT DATE: NORMAL
RAD ONC ARIA COURSE TREATMENT ELAPSED DAYS: NORMAL
RAD ONC ARIA REFERENCE POINT DOSAGE GIVEN TO DATE: 37.38
RAD ONC ARIA REFERENCE POINT DOSAGE GIVEN TO DATE: 37.38
RAD ONC ARIA REFERENCE POINT ID: NORMAL
RAD ONC ARIA REFERENCE POINT ID: NORMAL
RAD ONC ARIA REFERENCE POINT SESSION DOSAGE GIVEN: 2.67
RAD ONC ARIA REFERENCE POINT SESSION DOSAGE GIVEN: 2.67

## 2023-09-22 PROCEDURE — 77412 RADIATION TX DELIVERY LVL 3: CPT | Performed by: RADIOLOGY

## 2023-09-25 ENCOUNTER — HOSPITAL ENCOUNTER (OUTPATIENT)
Dept: RADIATION ONCOLOGY | Facility: MEDICAL CENTER | Age: 68
End: 2023-09-25

## 2023-09-25 LAB
CHEMOTHERAPY INFUSION START DATE: NORMAL
CHEMOTHERAPY INFUSION START DATE: NORMAL
CHEMOTHERAPY INFUSION STOP DATE: NORMAL
CHEMOTHERAPY RECORDS: 2.67
CHEMOTHERAPY RECORDS: 2.67
CHEMOTHERAPY RECORDS: 4005
CHEMOTHERAPY RECORDS: 4005
CHEMOTHERAPY RECORDS: NORMAL
CHEMOTHERAPY RX CANCER: NORMAL
CHEMOTHERAPY RX CANCER: NORMAL
DATE 1ST CHEMO CANCER: NORMAL
DATE 1ST CHEMO CANCER: NORMAL
RAD ONC ARIA COURSE LAST TREATMENT DATE: NORMAL
RAD ONC ARIA COURSE LAST TREATMENT DATE: NORMAL
RAD ONC ARIA COURSE TREATMENT ELAPSED DAYS: NORMAL
RAD ONC ARIA COURSE TREATMENT ELAPSED DAYS: NORMAL
RAD ONC ARIA REFERENCE POINT DOSAGE GIVEN TO DATE: 40.05
RAD ONC ARIA REFERENCE POINT ID: NORMAL
RAD ONC ARIA REFERENCE POINT SESSION DOSAGE GIVEN: 2.67
RAD ONC ARIA REFERENCE POINT SESSION DOSAGE GIVEN: 2.67

## 2023-09-25 PROCEDURE — 77427 RADIATION TX MANAGEMENT X5: CPT | Performed by: RADIOLOGY

## 2023-09-25 PROCEDURE — 77412 RADIATION TX DELIVERY LVL 3: CPT | Performed by: RADIOLOGY

## 2023-09-25 NOTE — RADIATION COMPLETION NOTES
END OF TREATMENT SUMMARY    Patient name:  Lashae Hoff    Primary Physician:  Krista Narayan M.D. MRN: [id number]  [facility]: [id number]   Referring physician:  Dr. Goldsmith  : 1955, 68 y.o.       TREATMENT SUMMARY:        Course First Treatment Date 2023    Course Last Treatment Date 2023   Course Elapsed Days 20 @ [id number]   Course Intent Curative     Radiation Therapy Episodes       Active Episodes       Radiation Therapy: 3D CRT                   Radiation Treatments         Plan Last Treated On Elapsed Days Fractions Treated Prescribed Fraction Dose (cGy) Prescribed Total Dose (cGy)    [facility]ast[facility] 2023 20 @ [id number] 15 of 15 267 4,005                  Reference Point Last Treated On Elapsed Days Most Recent Session Dose (cGy) Total Dose (cGy)    L_Breast[facility] 2023 20 @ [id number] -- 4,005    [facility]_Breast[facility] CP 2023 20 @ [id number] -- 4,005                                     STAGE:   Malignant neoplasm of upper-outer quadrant of left breast in female, estrogen receptor positive (HCC)  Staging form: Breast, AJCC 8th Edition  - Pathologic stage from 2023: Stage IA (pT2, pN0, cM0, G1, ER+, WY+, HER2-) - Signed by Monico Aguirre M.D. on 2023  Stage prefix: Initial diagnosis  Histologic grading system: 3 grade system       TREATMENT INDICATION:   Breast conservation therapy     CONCURRENT SYSTEMIC TREATMENT:   None     RT COURSE DISCONTINUED EARLY:   No     PATIENT EXPERIENCE:       2023    10:36 AM 2023    10:12 AM 2023    10:25 AM   Toxicity Assessment   Toxicity Assessment Breast Breast Breast   Fatigue (lethargy, malaise, asthenia) None None None   Fever (in the absence of neutropenia) None None None   Radiation Dermatitis None None None   Lymphatics Normal Normal Normal   RT - Pain due to RT None None None   Dyspnea Normal Normal Normal        FOLLOW-UP PLAN:   6 Weeks     COMMENT:          ANATOMIC TARGET  SUMMARY    ANATOMIC TARGET MODALITY TECHNIQUE   Left breast   External beam, photons 3D Conformal            COMMENT:         DIAGRAMS:      DOSE VOLUME HISTOGRAMS:

## 2023-11-04 ASSESSMENT — LIFESTYLE VARIABLES
TOBACCO_USE: NO
SMOKING_STATUS: NO

## 2023-11-07 ENCOUNTER — HOSPITAL ENCOUNTER (OUTPATIENT)
Dept: RADIATION ONCOLOGY | Facility: MEDICAL CENTER | Age: 68
End: 2023-11-30
Attending: RADIOLOGY
Payer: MEDICARE

## 2023-11-07 NOTE — PROGRESS NOTES
Telephone Appointment Visit   This telephone visit was initiated by the patient and they verbally consented.    Reason for Call:  Symptom Follow-up    HPI:    Stage IA (T2N0M0) G1 invasive ductal carcinoma of the left upper outer quadrant of breast, ER/ND positive HER2/igor negative with a Ki-67 of less than 5%, status post lumpectomy and sentinel lymph node biopsy followed by reexcision completing on 7/21/2023, Oncotype score 0, completing whole breast radiotherapy to 4005 cGy in September 2023 planning on an aromatase inhibitor starting next week    Patient states after completing her radiation she did notice hyperpigmentation in the radiation treatment area.  This is starting to return back to baseline with some subtle hyperpigmentation.  This is slightly more notable in both the mid axillary line in the inframammary fold.  She plans on starting her aromatase inhibitor next week.  She is otherwise doing well.    Labs / Images Reviewed:   None    Assessment and Plan:     While patient continues to follow in medical oncology I will release her from active follow-up in radiation oncology.  Should she have any questions or concerns at any time she should feel free to contact me.    Follow-up: As needed    Total Time Spent (mins): 15    Monico Aguirre M.D.

## 2024-02-27 ENCOUNTER — HOSPITAL ENCOUNTER (OUTPATIENT)
Dept: RADIOLOGY | Facility: MEDICAL CENTER | Age: 69
End: 2024-02-27
Attending: SURGERY
Payer: MEDICARE

## 2024-02-27 DIAGNOSIS — Z85.3 PERSONAL HISTORY OF MALIGNANT NEOPLASM OF BREAST: ICD-10-CM

## 2024-02-27 PROCEDURE — G0279 TOMOSYNTHESIS, MAMMO: HCPCS

## 2024-04-22 ENCOUNTER — HOSPITAL ENCOUNTER (OUTPATIENT)
Dept: LAB | Facility: MEDICAL CENTER | Age: 69
End: 2024-04-22
Attending: INTERNAL MEDICINE
Payer: MEDICARE

## 2024-04-22 LAB
ALBUMIN SERPL BCP-MCNC: 4.6 G/DL (ref 3.2–4.9)
ALBUMIN/GLOB SERPL: 1.8 G/DL
ALP SERPL-CCNC: 121 U/L (ref 30–99)
ALT SERPL-CCNC: 15 U/L (ref 2–50)
ANION GAP SERPL CALC-SCNC: 17 MMOL/L (ref 7–16)
AST SERPL-CCNC: 18 U/L (ref 12–45)
BILIRUB SERPL-MCNC: 0.4 MG/DL (ref 0.1–1.5)
BUN SERPL-MCNC: 10 MG/DL (ref 8–22)
CALCIUM ALBUM COR SERPL-MCNC: 9.3 MG/DL (ref 8.5–10.5)
CALCIUM SERPL-MCNC: 9.8 MG/DL (ref 8.5–10.5)
CHLORIDE SERPL-SCNC: 103 MMOL/L (ref 96–112)
CHOLEST SERPL-MCNC: 255 MG/DL (ref 100–199)
CO2 SERPL-SCNC: 21 MMOL/L (ref 20–33)
CREAT SERPL-MCNC: 0.52 MG/DL (ref 0.5–1.4)
FASTING STATUS PATIENT QL REPORTED: NORMAL
GFR SERPLBLD CREATININE-BSD FMLA CKD-EPI: 101 ML/MIN/1.73 M 2
GLOBULIN SER CALC-MCNC: 2.6 G/DL (ref 1.9–3.5)
GLUCOSE SERPL-MCNC: 92 MG/DL (ref 65–99)
HDLC SERPL-MCNC: 73 MG/DL
LDLC SERPL CALC-MCNC: 156 MG/DL
POTASSIUM SERPL-SCNC: 4.6 MMOL/L (ref 3.6–5.5)
PROT SERPL-MCNC: 7.2 G/DL (ref 6–8.2)
SODIUM SERPL-SCNC: 141 MMOL/L (ref 135–145)
T4 FREE SERPL-MCNC: 1.58 NG/DL (ref 0.93–1.7)
TRIGL SERPL-MCNC: 129 MG/DL (ref 0–149)

## 2024-04-22 PROCEDURE — 36415 COLL VENOUS BLD VENIPUNCTURE: CPT

## 2024-04-22 PROCEDURE — 80053 COMPREHEN METABOLIC PANEL: CPT

## 2024-04-22 PROCEDURE — 84439 ASSAY OF FREE THYROXINE: CPT

## 2024-04-22 PROCEDURE — 80061 LIPID PANEL: CPT

## 2024-05-29 ENCOUNTER — OFFICE VISIT (OUTPATIENT)
Dept: ENDOCRINOLOGY | Facility: MEDICAL CENTER | Age: 69
End: 2024-05-29
Attending: INTERNAL MEDICINE
Payer: MEDICARE

## 2024-05-29 ENCOUNTER — HOSPITAL ENCOUNTER (OUTPATIENT)
Dept: LAB | Facility: MEDICAL CENTER | Age: 69
End: 2024-05-29
Attending: INTERNAL MEDICINE
Payer: MEDICARE

## 2024-05-29 VITALS
OXYGEN SATURATION: 97 % | SYSTOLIC BLOOD PRESSURE: 118 MMHG | BODY MASS INDEX: 33.49 KG/M2 | HEART RATE: 89 BPM | WEIGHT: 182 LBS | DIASTOLIC BLOOD PRESSURE: 62 MMHG | HEIGHT: 62 IN

## 2024-05-29 DIAGNOSIS — E05.90 HYPERTHYROIDISM: ICD-10-CM

## 2024-05-29 DIAGNOSIS — M85.89 OSTEOPENIA OF MULTIPLE SITES: ICD-10-CM

## 2024-05-29 DIAGNOSIS — E55.9 VITAMIN D DEFICIENCY: ICD-10-CM

## 2024-05-29 DIAGNOSIS — E05.20 TOXIC MULTINODULAR GOITER: ICD-10-CM

## 2024-05-29 LAB
BASOPHILS # BLD AUTO: 0.8 % (ref 0–1.8)
BASOPHILS # BLD: 0.04 K/UL (ref 0–0.12)
EOSINOPHIL # BLD AUTO: 0.09 K/UL (ref 0–0.51)
EOSINOPHIL NFR BLD: 1.7 % (ref 0–6.9)
ERYTHROCYTE [DISTWIDTH] IN BLOOD BY AUTOMATED COUNT: 44.9 FL (ref 35.9–50)
HCT VFR BLD AUTO: 46.1 % (ref 37–47)
HGB BLD-MCNC: 16 G/DL (ref 12–16)
IMM GRANULOCYTES # BLD AUTO: 0 K/UL (ref 0–0.11)
IMM GRANULOCYTES NFR BLD AUTO: 0 % (ref 0–0.9)
LYMPHOCYTES # BLD AUTO: 1.62 K/UL (ref 1–4.8)
LYMPHOCYTES NFR BLD: 31 % (ref 22–41)
MCH RBC QN AUTO: 31.9 PG (ref 27–33)
MCHC RBC AUTO-ENTMCNC: 34.7 G/DL (ref 32.2–35.5)
MCV RBC AUTO: 92 FL (ref 81.4–97.8)
MONOCYTES # BLD AUTO: 0.55 K/UL (ref 0–0.85)
MONOCYTES NFR BLD AUTO: 10.5 % (ref 0–13.4)
NEUTROPHILS # BLD AUTO: 2.93 K/UL (ref 1.82–7.42)
NEUTROPHILS NFR BLD: 56 % (ref 44–72)
NRBC # BLD AUTO: 0 K/UL
NRBC BLD-RTO: 0 /100 WBC (ref 0–0.2)
PLATELET # BLD AUTO: 205 K/UL (ref 164–446)
PMV BLD AUTO: 11.8 FL (ref 9–12.9)
RBC # BLD AUTO: 5.01 M/UL (ref 4.2–5.4)
WBC # BLD AUTO: 5.2 K/UL (ref 4.8–10.8)

## 2024-05-29 RX ORDER — ANASTROZOLE 1 MG/1
TABLET ORAL
COMMUNITY
Start: 2024-03-01

## 2024-05-29 ASSESSMENT — FIBROSIS 4 INDEX: FIB4 SCORE: 1.4

## 2024-05-29 NOTE — PROGRESS NOTES
Chief Complaint: Follow up for Hyperthyroidism secondary to probable toxic multinodular goiter    HPI:     Lashae Hoff is a 69 y.o. female here for follow up of Hyperthyroidism.      Patient was last seen in the office by me on April 2022.  Prior to that she was seen by Dr. Brewster then by nurse practitioner Adele    She has a history of hyperthyroidism which has not been fully addressed because of limited follow-up    Historically her TSH levels have been ranging from partially suppressed to fully suppressed with intermittent elevation of free T3 levels compatible with T3 toxicosis    In the past I recommended methimazole therapy for her but she did not comply because of fear of the side effects of methimazole but she did not notify me about this particular issue until today  She also did not follow-up after her last visit on April 2022      Her baseline thyroid ultrasound in 2018 showed multinodular goiter with a dominant solid nodule on the right mid lobe measuring greater than 3.6 cm that was biopsied in 2018 with benign cytology.  She also has a hypoechoic solid nodule in the left upper lobe which has not been biopsied.    She had an ultrasound in 2022 which showed stability of the thyroid nodules including stability of the previously biopsied nodule on the right mid lobe and a stable nodule in the left upper lobe    She had a thyroid uptake and scan on June 2022 which showed increased uptake of 25.7% (reference range 8-15%) with diffuse homogeneous labeling throughout the thyroid gland compatible with Graves' disease versus toxic multinodular goiter            I am seeing her today as she is trying to get reestablished.  She has been lost to follow-up for 2 years.  In the interim she was diagnosed with breast cancer and saw Dr. Jillian Goldsmith and had a partial mastectomy for ER positive breast cancer and is now on Arimidex.      Since I last saw her she got reestablished with a new primary care at  [facility] and they ordered an ultrasound which was completed at [facility] on April 2024 which showed an enlarging dominant solid nodule in the right mid lobe now measuring 4.2 cm.  This corresponds to the nodule that was previously biopsied in 2018.  She also has a new solid nodule on the left mid lobe measuring 2.3 cm and is stable solid nodule in the left upper lobe measuring 1.6 cm      She continues to be hyperthyroid based on previous labs but her current labs are incomplete.   TSH was not drawn on April 2024 only free T4 was drawn      Her labs on July 3, 2023 are significantly concerning because of her TSH being markedly suppressed or undetectable at less than 0.005 and free T4 being frankly elevated at 1.8.    A free T3 level was not measured      She historically has osteopenia confirmed on bone densitometry on August 2021 but she reports that she had another bone density at [facility] showing substantial bone loss and she now has osteoporosis but we do not have those records      Currently she denies palpitations, tremors, weight loss, and insomnia.  She admits to having palpitations in the past and she saw cardiology.  We explained that her palpitations could be from her hyperthyroidism and not from cardiac pathology          Patient's medications, allergies, and social histories were reviewed and updated as appropriate.      ROS:     CONS:     No fever, no chills   EYES:     No diplopia, no blurry vision   CV:           No chest pain, no palpitations   PULM:     No SOB, no cough, no hemoptysis.   GI:            No nausea, no vomiting, no diarrhea, no constipation   ENDO:     No polyuria, no polydipsia, no heat intolerance, no cold intolerance       Past Medical History:  Problem List:  2024-05: Toxic multinodular goiter  2023-07: Gallstone  2023-05: Disorder of lipid metabolism  2023-05: Mitral valve prolapse  2023-05: Malignant neoplasm of upper-outer quadrant of left breast in   female,  estrogen receptor positive (Formerly [facility])  2022: Hyperthyroidism  2022: Graves disease  2022: Osteopenia of multiple sites  2022: Vitamin D deficiency  2021: Thyroid nodule  2018: Alkaline phosphatase elevation  2018: Arthritis  2018: Obesity (BMI 35.0-39.9 without comorbidity) (Formerly [facility])  2018: RBBB  2018: Enlarged thyroid gland  2016: ROMELIA (obstructive sleep apnea)  2016: Hypersomnolence  2016: Hashimoto's thyroiditis  2016: Elevated fasting glucose  Dyslipidemia, goal LDL below 130  GERD (gastroesophageal reflux disease)      Past Surgical History:  Past Surgical History:   Procedure Laterality Date    PB MASTECTOMY, PARTIAL Left 2023    Procedure: LEFT RE-EXCISION PARTIAL MASTECTOMY;  Surgeon: Jillian Goldsmith M.D.;  Location: SURGERY SAME DAY [facility];  Service: General    PB MASTECTOMY, PARTIAL Left 2023    Procedure: LEFT PARTIAL MASTECTOMY WITH AXILLARY SENTINEL LYMPH NODE BIOPSY;  Surgeon: Jillian Goldsmith M.D.;  Location: SURGERY SAME DAY [facility];  Service: General    NODE BIOPSY SENTINEL Left 2023    Procedure: BIOPSY, LYMPH NODE, SENTINEL;  Surgeon: Jillian Goldsmith M.D.;  Location: SURGERY SAME DAY [facility];  Service: General    COLONOSCOPY  2018    EGD ESOPHAGUS WITH ENDOSCOPIC US  2018    BRAD BY LAPAROSCOPY  2013    TONSILLECTOMY          Allergies:  Macrobid [nitrofurantoin]     Social History:  Social History     Tobacco Use    Smoking status: Former     Current packs/day: 0.00     Average packs/day: 1 pack/day for 6.0 years (6.0 ttl pk-yrs)     Types: Cigarettes     Start date: 1974     Quit date: 1980     Years since quittin.9    Smokeless tobacco: Never   Vaping Use    Vaping status: Never Used   Substance Use Topics    Alcohol use: Yes     Alcohol/week: 1.2 oz     Types: 2 Glasses of wine per week     Comment: occ    Drug use: No        Family History:   family history includes Arthritis in her father and mother; Cancer in her father;  "Diabetes in her brother and mother; Hyperlipidemia in her mother; Hypertension in her mother.      PHYSICAL EXAM:   Vital signs: /62 (BP Location: Right arm, Patient Position: Sitting, BP Cuff Size: Large adult)   Pulse 89   Ht 1.575 m (5' 2\")   Wt 82.6 kg (182 lb)   SpO2 97%   BMI 33.29 kg/m²   GENERAL: Well-developed, well-nourished in no apparent distress.   EYE:  No ocular asymmetry, PERRLA, No exophthalmos or lid lag  HENT: Pink, moist mucous membranes.    NECK: No thyromegaly.   CARDIOVASCULAR:  No murmurs  LUNGS: Clear breath sounds  ABDOMEN: Soft, nontender   EXTREMITIES: No clubbing, cyanosis, or edema.   NEUROLOGICAL: No gross focal motor abnormalities, No visible tremors with both hands  LYMPH: No cervical adenopathy palpated.   SKIN: No rashes, lesions.     Labs:  Lab Results   Component Value Date/Time    WBC 6.1 07/03/2023 11:42 AM    RBC 4.94 07/03/2023 11:42 AM    HEMOGLOBIN 15.6 07/03/2023 11:42 AM    MCV 94.5 07/03/2023 11:42 AM    MCH 31.6 07/03/2023 11:42 AM    MCHC 33.4 07/03/2023 11:42 AM    RDW 45.2 07/03/2023 11:42 AM    MPV 12.1 07/03/2023 11:42 AM       Lab Results   Component Value Date/Time    SODIUM 141 04/22/2024 11:01 AM    POTASSIUM 4.6 04/22/2024 11:01 AM    CHLORIDE 103 04/22/2024 11:01 AM    CO2 21 04/22/2024 11:01 AM    ANION 17.0 (H) 04/22/2024 11:01 AM    GLUCOSE 92 04/22/2024 11:01 AM    BUN 10 04/22/2024 11:01 AM    CREATININE 0.52 04/22/2024 11:01 AM    CALCIUM 9.8 04/22/2024 11:01 AM    ASTSGOT 18 04/22/2024 11:01 AM    ALTSGPT 15 04/22/2024 11:01 AM    TBILIRUBIN 0.4 04/22/2024 11:01 AM    ALBUMIN 4.6 04/22/2024 11:01 AM    ALBUMIN 4.10 05/31/2022 08:49 AM    TOTPROTEIN 7.2 04/22/2024 11:01 AM    TOTPROTEIN 6.8 05/31/2022 08:49 AM    GLOBULIN 2.6 04/22/2024 11:01 AM    AGRATIO 1.8 04/22/2024 11:01 AM       Lab Results   Component Value Date/Time    TSHULTRASEN <0.005 (L) 07/03/2023 1142     Lab Results   Component Value Date/Time    FREET4 1.58 04/22/2024 1101 " "    Lab Results   Component Value Date/Time    FREET3 3.75 2022 1017     No results found for: \"THYSTIMIG\"      Imagin2021 2:09 PM     HISTORY/REASON FOR EXAM:  Hashimoto's thyroiditis, thyroid nodule     TECHNIQUE/EXAM DESCRIPTION:  Ultrasound of the soft tissues of the head and neck.     COMPARISON:  Thyroid ultrasound 2018     FINDINGS:  The thyroid gland is heterogeneous.  Vascularity is normal.     The right lobe of the thyroid gland measures 2.95 cm x 5.91 cm x 2.86 cm.  The left lobe of the thyroid gland measures 1.99 cm x 5.75 cm x 2.03 cm.  The isthmus measures 0.71 cm.     Nodules >= 1cm:        Nodule #1  Location:  Right  mid  Size:  3.67 x 2.73 x 2.72 cm  Composition:  Mixed-1  Echogenicity:  Isoechoic-1  Shape:  Wider than tall-0  Margins:  Smooth-0  Echogenic Foci:  None-0     ACR TIRADS points/category:  2 - TR2 - Not Suspicious     Nodule #2  Location:  Left  upper  Size:  1.55 x 0.88 x 0.49 cm  Composition:  Mixed-1  Echogenicity:  Hypoechoic-2  Shape:  Wider than tall-0  Margins:  Smooth-0  Echogenic Foci:  None-0     ACR TIRADS points/category:  3 - TR3 - Mildly Suspicious           IMPRESSION:     A 3.6 cm TR2 mixed nodule in the right thyroid gland.  A 1.5 cm TR3 nodule in the left thyroid gland.     ACR TI-RADS Recommendations  TR3 - follow up ultrasound in 1, 3, and 5 years.     Recommendations based on the American College of Radiology Thyroid imaging, reporting and Data System (TI-RADS) 2017.            2022 11:13 AM     HISTORY/REASON FOR EXAM:  Thyrotoxicosis  Hyperthyroidism     TECHNIQUE/EXAM DESCRIPTION AND NUMBER OF VIEWS:  Thyroid uptake and scan, nuclear medicine.     PROCEDURE:     468 microcuries I-123 were administered as two capsules. The patient returned five hours later for uptake measurements and scanning.     COMPARISON: Thyroid ultrasound 2018     FINDINGS:  The planar scan demonstrates homogeneous labeling throughout the thyroid gland.   "   The five-hour uptake measurement equals 25.7%. Normal range is 8-15%.     IMPRESSION:     Mildly elevated radioactive iodine uptake.             ASSESSMENT/PLAN:     1. Hyperthyroidism  Unstable  She has a history of uncontrolled hyperthyroidism that has been untreated for some time because of poor follow-up.  We are getting updated labs today to check her TSH, free T4, free T3, CBC, CMP TSI and thyrotropin receptor antibodies and I will update her  We reviewed the overview of hyperthyroidism therapy including medical therapy, radioactive iodine therapy and surgery as a last resort  We reviewed the advantages and disadvantages of each approach  If she has uncontrolled hyperthyroidism I will start her on methimazole  Depending on her labs we may end up with a moderate dose of methimazole such as 10 to 20 mg daily  We have to take into consideration that she is afraid of the side effects of this medication  We will then repeat her labs again in 2 months to see how she is doing on methimazole therapy    2. Toxic multinodular goiter  Unstable this is the etiology of her hyperthyroidism  She has a dominant nodule in the right lobe that has been biopsied and proven benign but is unstable on recent ultrasound  She has a newly defined or discovered solid nodule on the left mid lobe.  I am going to schedule her for biopsy of the 2 nodules    3. Osteopenia of multiple sites  Unstable she has osteopenia at baseline but now has osteoporosis because of untreated hyperthyroidism.  We discussed that hypothyroidism causes bone loss  I want her to get records of her bone density from [facility] and I will update her    4. Vitamin D deficiency  Unstable we are getting updated labs for her vitamin D because of her history of low bone mass  I will update her and make recommendations      Return in about 2 months (around 7/29/2024).    Total time spent on day of service was over 60 minutes which included obtaining a detailed  history and physical exam, ordering labs, coordinating care and scheduling future follow-up    Thank you kindly for allowing me to participate in the thyroid care plan for this patient.    Monico Deluca MD, [facility], [facility]      CC:   Krista Narayan M.D.

## 2024-05-30 LAB
ALBUMIN SERPL BCP-MCNC: 4.4 G/DL (ref 3.2–4.9)
ALBUMIN/GLOB SERPL: 1.7 G/DL
ALP SERPL-CCNC: 130 U/L (ref 30–99)
ALT SERPL-CCNC: 16 U/L (ref 2–50)
ANION GAP SERPL CALC-SCNC: 16 MMOL/L (ref 7–16)
AST SERPL-CCNC: 17 U/L (ref 12–45)
BILIRUB SERPL-MCNC: 0.5 MG/DL (ref 0.1–1.5)
BUN SERPL-MCNC: 13 MG/DL (ref 8–22)
CALCIUM ALBUM COR SERPL-MCNC: 9.1 MG/DL (ref 8.5–10.5)
CALCIUM SERPL-MCNC: 9.4 MG/DL (ref 8.5–10.5)
CHLORIDE SERPL-SCNC: 104 MMOL/L (ref 96–112)
CO2 SERPL-SCNC: 22 MMOL/L (ref 20–33)
CREAT SERPL-MCNC: 0.52 MG/DL (ref 0.5–1.4)
GFR SERPLBLD CREATININE-BSD FMLA CKD-EPI: 101 ML/MIN/1.73 M 2
GLOBULIN SER CALC-MCNC: 2.6 G/DL (ref 1.9–3.5)
GLUCOSE SERPL-MCNC: 105 MG/DL (ref 65–99)
POTASSIUM SERPL-SCNC: 4 MMOL/L (ref 3.6–5.5)
PROT SERPL-MCNC: 7 G/DL (ref 6–8.2)
SODIUM SERPL-SCNC: 142 MMOL/L (ref 135–145)
T3FREE SERPL-MCNC: 3.38 PG/ML (ref 2–4.4)
T4 FREE SERPL-MCNC: 1.57 NG/DL (ref 0.93–1.7)
TSH SERPL DL<=0.005 MIU/L-ACNC: <0.005 UIU/ML (ref 0.38–5.33)

## 2024-05-31 LAB — TSI SER-ACNC: 0.73 IU/L

## 2024-06-06 ENCOUNTER — TELEPHONE (OUTPATIENT)
Dept: ENDOCRINOLOGY | Facility: MEDICAL CENTER | Age: 69
End: 2024-06-06
Payer: MEDICARE

## 2024-06-06 DIAGNOSIS — E05.90 HYPERTHYROIDISM: ICD-10-CM

## 2024-06-06 RX ORDER — METHIMAZOLE 5 MG/1
5 TABLET ORAL DAILY
Qty: 90 TABLET | Refills: 1 | Status: SHIPPED | OUTPATIENT
Start: 2024-06-06

## 2024-06-07 NOTE — TELEPHONE ENCOUNTER
Called patient and confirmed that she has hyperthyroidism from Graves' disease because her hyperthyroidism is not severe I am starting her on methimazole low-dose 5 mg daily reminded patient to get labs done before her next appointment

## 2024-08-07 ENCOUNTER — HOSPITAL ENCOUNTER (OUTPATIENT)
Dept: LAB | Facility: MEDICAL CENTER | Age: 69
End: 2024-08-07
Attending: INTERNAL MEDICINE
Payer: MEDICARE

## 2024-08-07 DIAGNOSIS — E05.90 HYPERTHYROIDISM: ICD-10-CM

## 2024-08-07 PROCEDURE — 84439 ASSAY OF FREE THYROXINE: CPT

## 2024-08-07 PROCEDURE — 84481 FREE ASSAY (FT-3): CPT

## 2024-08-07 PROCEDURE — 84443 ASSAY THYROID STIM HORMONE: CPT

## 2024-08-07 PROCEDURE — 36415 COLL VENOUS BLD VENIPUNCTURE: CPT

## 2024-08-08 LAB
T3FREE SERPL-MCNC: 2.75 PG/ML (ref 2–4.4)
T4 FREE SERPL-MCNC: 1.06 NG/DL (ref 0.93–1.7)
TSH SERPL-ACNC: 0.47 UIU/ML (ref 0.35–5.5)

## 2024-08-12 ENCOUNTER — PROCEDURE VISIT (OUTPATIENT)
Dept: ENDOCRINOLOGY | Facility: MEDICAL CENTER | Age: 69
End: 2024-08-12
Attending: INTERNAL MEDICINE
Payer: MEDICARE

## 2024-08-12 ENCOUNTER — HOSPITAL ENCOUNTER (OUTPATIENT)
Facility: MEDICAL CENTER | Age: 69
End: 2024-08-12
Attending: INTERNAL MEDICINE
Payer: MEDICARE

## 2024-08-12 DIAGNOSIS — E05.20 TOXIC MULTINODULAR GOITER: ICD-10-CM

## 2024-08-12 DIAGNOSIS — E55.9 VITAMIN D DEFICIENCY: ICD-10-CM

## 2024-08-12 DIAGNOSIS — E04.1 THYROID NODULE: ICD-10-CM

## 2024-08-12 DIAGNOSIS — E05.90 HYPERTHYROIDISM: ICD-10-CM

## 2024-08-12 DIAGNOSIS — M85.89 OSTEOPENIA OF MULTIPLE SITES: ICD-10-CM

## 2024-08-12 LAB — PATHOLOGY CONSULT NOTE: NORMAL

## 2024-08-12 PROCEDURE — 10006 FNA BX W/US GDN EA ADDL: CPT | Performed by: INTERNAL MEDICINE

## 2024-08-12 PROCEDURE — 88173 CYTOPATH EVAL FNA REPORT: CPT | Mod: 91

## 2024-08-12 PROCEDURE — 10005 FNA BX W/US GDN 1ST LES: CPT | Performed by: INTERNAL MEDICINE

## 2024-08-12 RX ORDER — METHIMAZOLE 5 MG/1
5 TABLET ORAL DAILY
Qty: 90 TABLET | Refills: 1 | Status: SHIPPED | OUTPATIENT
Start: 2024-08-12

## 2024-08-12 NOTE — PROGRESS NOTES
POC US guided FNA procedure was completed today for the RML  and JONES solid nodule today   Please see endocrinologist procedure note  Patient tolerated procedure well without complaints.  Patient was advised that she will be contacted regarding the results and next plan  Patient was advised to follow up as planned with labs prior   Patient was advised to take tylenol or ibuprofen for pain  No restrictions were advised post procedure   Patient was advised to call for any issues post biopsy       We discussed her labs - she is euthyroid again on Methimazole  Continue METHIMAZOLE 5mg daily  Repeat labs in 6 mos       She also brought records showing osteoporosis  She is not on medications for this   She has GERD so we will order IV reclast   Recommend she start calcium and vitamin D      Monico Deluca M.D.

## 2025-02-18 ENCOUNTER — TELEPHONE (OUTPATIENT)
Dept: ENDOCRINOLOGY | Facility: MEDICAL CENTER | Age: 70
End: 2025-02-18
Payer: MEDICARE

## 2025-02-19 ENCOUNTER — APPOINTMENT (OUTPATIENT)
Dept: ENDOCRINOLOGY | Facility: MEDICAL CENTER | Age: 70
End: 2025-02-19
Attending: INTERNAL MEDICINE
Payer: MEDICARE

## 2025-02-21 ENCOUNTER — HOSPITAL ENCOUNTER (OUTPATIENT)
Dept: LAB | Facility: MEDICAL CENTER | Age: 70
End: 2025-02-21
Attending: INTERNAL MEDICINE
Payer: MEDICARE

## 2025-02-21 DIAGNOSIS — E55.9 VITAMIN D DEFICIENCY: ICD-10-CM

## 2025-02-21 DIAGNOSIS — M85.89 OSTEOPENIA OF MULTIPLE SITES: ICD-10-CM

## 2025-02-21 DIAGNOSIS — E05.20 TOXIC MULTINODULAR GOITER: ICD-10-CM

## 2025-02-21 DIAGNOSIS — E04.1 THYROID NODULE: ICD-10-CM

## 2025-02-21 LAB
25(OH)D3 SERPL-MCNC: 32 NG/ML (ref 30–100)
ALBUMIN SERPL BCP-MCNC: 4.4 G/DL (ref 3.2–4.9)
ALBUMIN/GLOB SERPL: 1.6 G/DL
ALP SERPL-CCNC: 124 U/L (ref 30–99)
ALT SERPL-CCNC: 20 U/L (ref 2–50)
ANION GAP SERPL CALC-SCNC: 14 MMOL/L (ref 7–16)
AST SERPL-CCNC: 23 U/L (ref 12–45)
BASOPHILS # BLD AUTO: 0.5 % (ref 0–1.8)
BASOPHILS # BLD: 0.03 K/UL (ref 0–0.12)
BILIRUB SERPL-MCNC: 0.3 MG/DL (ref 0.1–1.5)
BUN SERPL-MCNC: 12 MG/DL (ref 8–22)
CALCIUM ALBUM COR SERPL-MCNC: 9 MG/DL (ref 8.5–10.5)
CALCIUM SERPL-MCNC: 9.3 MG/DL (ref 8.5–10.5)
CHLORIDE SERPL-SCNC: 102 MMOL/L (ref 96–112)
CO2 SERPL-SCNC: 21 MMOL/L (ref 20–33)
CREAT SERPL-MCNC: 0.73 MG/DL (ref 0.5–1.4)
EOSINOPHIL # BLD AUTO: 0.11 K/UL (ref 0–0.51)
EOSINOPHIL NFR BLD: 1.8 % (ref 0–6.9)
ERYTHROCYTE [DISTWIDTH] IN BLOOD BY AUTOMATED COUNT: 42.9 FL (ref 35.9–50)
GFR SERPLBLD CREATININE-BSD FMLA CKD-EPI: 89 ML/MIN/1.73 M 2
GLOBULIN SER CALC-MCNC: 2.8 G/DL (ref 1.9–3.5)
GLUCOSE SERPL-MCNC: 92 MG/DL (ref 65–99)
HCT VFR BLD AUTO: 44.5 % (ref 37–47)
HGB BLD-MCNC: 15.1 G/DL (ref 12–16)
IMM GRANULOCYTES # BLD AUTO: 0.01 K/UL (ref 0–0.11)
IMM GRANULOCYTES NFR BLD AUTO: 0.2 % (ref 0–0.9)
LYMPHOCYTES # BLD AUTO: 1.73 K/UL (ref 1–4.8)
LYMPHOCYTES NFR BLD: 28.9 % (ref 22–41)
MCH RBC QN AUTO: 32.3 PG (ref 27–33)
MCHC RBC AUTO-ENTMCNC: 33.9 G/DL (ref 32.2–35.5)
MCV RBC AUTO: 95.3 FL (ref 81.4–97.8)
MONOCYTES # BLD AUTO: 0.56 K/UL (ref 0–0.85)
MONOCYTES NFR BLD AUTO: 9.3 % (ref 0–13.4)
NEUTROPHILS # BLD AUTO: 3.55 K/UL (ref 1.82–7.42)
NEUTROPHILS NFR BLD: 59.3 % (ref 44–72)
NRBC # BLD AUTO: 0 K/UL
NRBC BLD-RTO: 0 /100 WBC (ref 0–0.2)
PLATELET # BLD AUTO: 202 K/UL (ref 164–446)
PMV BLD AUTO: 12.4 FL (ref 9–12.9)
POTASSIUM SERPL-SCNC: 4.1 MMOL/L (ref 3.6–5.5)
PROT SERPL-MCNC: 7.2 G/DL (ref 6–8.2)
RBC # BLD AUTO: 4.67 M/UL (ref 4.2–5.4)
SODIUM SERPL-SCNC: 137 MMOL/L (ref 135–145)
T3FREE SERPL-MCNC: 2.51 PG/ML (ref 2–4.4)
T4 FREE SERPL-MCNC: 1.25 NG/DL (ref 0.93–1.7)
TSH SERPL-ACNC: 1.23 UIU/ML (ref 0.35–5.5)
WBC # BLD AUTO: 6 K/UL (ref 4.8–10.8)

## 2025-02-21 PROCEDURE — 84481 FREE ASSAY (FT-3): CPT

## 2025-02-21 PROCEDURE — 85025 COMPLETE CBC W/AUTO DIFF WBC: CPT

## 2025-02-21 PROCEDURE — 80053 COMPREHEN METABOLIC PANEL: CPT

## 2025-02-21 PROCEDURE — 36415 COLL VENOUS BLD VENIPUNCTURE: CPT

## 2025-02-21 PROCEDURE — 84443 ASSAY THYROID STIM HORMONE: CPT

## 2025-02-21 PROCEDURE — 84439 ASSAY OF FREE THYROXINE: CPT

## 2025-02-21 PROCEDURE — 82306 VITAMIN D 25 HYDROXY: CPT

## 2025-02-27 ENCOUNTER — OFFICE VISIT (OUTPATIENT)
Dept: ENDOCRINOLOGY | Facility: MEDICAL CENTER | Age: 70
End: 2025-02-27
Attending: INTERNAL MEDICINE
Payer: MEDICARE

## 2025-02-27 VITALS
HEIGHT: 62 IN | OXYGEN SATURATION: 95 % | DIASTOLIC BLOOD PRESSURE: 64 MMHG | SYSTOLIC BLOOD PRESSURE: 122 MMHG | HEART RATE: 77 BPM | WEIGHT: 181 LBS | BODY MASS INDEX: 33.31 KG/M2

## 2025-02-27 DIAGNOSIS — E05.00 GRAVES DISEASE: ICD-10-CM

## 2025-02-27 DIAGNOSIS — Z85.3 HISTORY OF BREAST CANCER: ICD-10-CM

## 2025-02-27 DIAGNOSIS — R74.8 ELEVATED ALKALINE PHOSPHATASE LEVEL: ICD-10-CM

## 2025-02-27 DIAGNOSIS — M81.0 AGE-RELATED OSTEOPOROSIS WITHOUT CURRENT PATHOLOGICAL FRACTURE: ICD-10-CM

## 2025-02-27 DIAGNOSIS — E55.9 VITAMIN D DEFICIENCY: ICD-10-CM

## 2025-02-27 DIAGNOSIS — E05.90 HYPERTHYROIDISM: ICD-10-CM

## 2025-02-27 DIAGNOSIS — E04.2 MULTINODULAR GOITER: ICD-10-CM

## 2025-02-27 PROCEDURE — 99211 OFF/OP EST MAY X REQ PHY/QHP: CPT | Performed by: INTERNAL MEDICINE

## 2025-02-27 RX ORDER — EPINEPHRINE 1 MG/ML(1)
0.5 AMPUL (ML) INJECTION PRN
OUTPATIENT
Start: 2025-02-27

## 2025-02-27 RX ORDER — ACETAMINOPHEN 325 MG/1
650 TABLET ORAL ONCE
OUTPATIENT
Start: 2025-02-27 | End: 2025-02-27

## 2025-02-27 RX ORDER — DIPHENHYDRAMINE HYDROCHLORIDE 50 MG/ML
50 INJECTION INTRAMUSCULAR; INTRAVENOUS PRN
OUTPATIENT
Start: 2025-02-27

## 2025-02-27 RX ORDER — 0.9 % SODIUM CHLORIDE 0.9 %
3 VIAL (ML) INJECTION PRN
OUTPATIENT
Start: 2025-02-27

## 2025-02-27 RX ORDER — 0.9 % SODIUM CHLORIDE 0.9 %
10 VIAL (ML) INJECTION PRN
OUTPATIENT
Start: 2025-02-27

## 2025-02-27 RX ORDER — ZOLEDRONIC ACID 0.05 MG/ML
5 INJECTION, SOLUTION INTRAVENOUS ONCE
OUTPATIENT
Start: 2025-02-27 | End: 2025-02-27

## 2025-02-27 RX ORDER — METHYLPREDNISOLONE SODIUM SUCCINATE 125 MG/2ML
125 INJECTION, POWDER, LYOPHILIZED, FOR SOLUTION INTRAMUSCULAR; INTRAVENOUS PRN
OUTPATIENT
Start: 2025-02-27

## 2025-02-27 RX ORDER — SODIUM CHLORIDE 9 MG/ML
INJECTION, SOLUTION INTRAVENOUS CONTINUOUS
OUTPATIENT
Start: 2025-02-27

## 2025-02-27 RX ORDER — METHIMAZOLE 5 MG/1
5 TABLET ORAL DAILY
Qty: 90 TABLET | Refills: 2 | Status: SHIPPED | OUTPATIENT
Start: 2025-02-27

## 2025-02-27 RX ORDER — 0.9 % SODIUM CHLORIDE 0.9 %
VIAL (ML) INJECTION PRN
OUTPATIENT
Start: 2025-02-27

## 2025-02-27 ASSESSMENT — FIBROSIS 4 INDEX: FIB4 SCORE: 1.76

## 2025-02-27 NOTE — PROGRESS NOTES
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 female here for follow up of Hyperthyroidism.      Patient was last seen in the office by me on April 2022.  Prior to that she was seen by Dr. Brewster then by nurse practitioner Adele    She has a history of hyperthyroidism which has not been fully addressed because of limited follow-up    Historically her TSH levels have been ranging from partially suppressed to fully suppressed with intermittent elevation of free T3 levels compatible with T3 toxicosis    In the past I recommended methimazole therapy for her but she did not comply because of fear of the side effects of methimazole but she did not notify me about this particular issue until today  She also did not follow-up after her last visit on April 2022      Her baseline thyroid ultrasound in 2018 showed multinodular goiter with a dominant solid nodule on the right mid lobe measuring greater than 3.6 cm that was biopsied in 2018 with benign cytology.  She also has a hypoechoic solid nodule in the left upper lobe     She had an ultrasound in 2022 which showed stability of the thyroid nodules including stability of the previously biopsied nodule on the right mid lobe and a stable nodule in the left upper lobe    She had a thyroid uptake and scan on June 2022 which showed increased uptake of 25.7% (reference range 8-15%) with diffuse homogeneous labeling throughout the thyroid gland compatible with Graves' disease versus toxic multinodular goiter      She then got reestablished in May 2024 after a period of no follow-up.  In the interim she was diagnosed with estrogen receptor positive breast cancer and underwent a partial mastectomy and is on aromatase inhibitor therapy      ultrasound which was completed at [facility] on April 2024 which showed an enlarging dominant solid nodule in the right mid lobe now measuring 4.2  cm.  This corresponds to the nodule that was previously biopsied in 2018.  She also has a new solid nodule on the left mid lobe measuring 2.3 cm and is stable solid nodule in the left upper lobe measuring 1.6 cm      She underwent biopsies of the right mid lobe and left upper lobe solid nodule on August 2024 in the office and pathology came back benign      She was also found to be hyperthyroid after getting reestablished on May 29, 2024  Her TSH was less than 0.005 Free T4 was normal at 1.57 Free T3 was normal at 3.38 TSI was elevated 0.73    Her labs are compatible with Graves' disease  She was started on methimazole 5 mg daily      On follow-up she remains on methimazole 5 mg daily and reports good compliance  She feels great overall  She denies palpitations tremors and unexplained weight loss    She is back to being euthyroid   Latest Reference Range & Units 02/21/25 12:53   25-Hydroxy   Vitamin D 25 30 - 100 ng/mL 32   TSH 0.350 - 5.500 uIU/mL 1.230   Free T-4 0.93 - 1.70 ng/dL 1.25   T3,Free 2.00 - 4.40 pg/mL 2.51               She historically has osteopenia confirmed on bone densitometry on August 2021 but she reports that she had another bone density at [facility] showing substantial bone loss       Her bone density on May 2024 done at [facility]  Showed osteoporosis with the lowest T-score of -2.8 for the left hip    She is currently not on antiresorptive therapy and we discussed starting treatment today  She is concerned that she has GERD  She is afraid of worsening GERD with oral bisphosphonate therapy so we discussed IV Reclast  She is taking calcium and vitamin D          Patient's medications, allergies, and social histories were reviewed and updated as appropriate.      ROS:     CONS:     No fever, no chills   EYES:     No diplopia, no blurry vision   CV:           No chest pain, no palpitations   PULM:     No SOB, no cough, no hemoptysis.   GI:            No nausea, no  vomiting, no diarrhea, no constipation   ENDO:     No polyuria, no polydipsia, no heat intolerance, no cold intolerance       Past Medical History:  Problem List:  2024-05: Toxic multinodular goiter  2023-07: Gallstone  2023-05: Disorder of lipid metabolism  2023-05: Mitral valve prolapse  2023-05: Malignant neoplasm of upper-outer quadrant of left breast in   female, estrogen receptor positive ([facility])  2022-04: Hyperthyroidism  2022-04: Graves disease  2022-04: Osteopenia of multiple sites  2022-04: Vitamin D deficiency  2021-07: Thyroid nodule  2018-01: Alkaline phosphatase elevation  2018-01: Arthritis  2018-01: Obesity (BMI 35.0-39.9 without comorbidity) ([facility])  2018-01: RBBB  2018-01: Enlarged thyroid gland  2016-07: ROMELIA (obstructive sleep apnea)  2016-07: Hypersomnolence  2016-07: Hashimoto's thyroiditis  2016-07: Elevated fasting glucose  Dyslipidemia, goal LDL below 130  GERD (gastroesophageal reflux disease)      Past Surgical History:  Past Surgical History:   Procedure Laterality Date    PB MASTECTOMY, PARTIAL Left 7/21/2023    Procedure: LEFT RE-EXCISION PARTIAL MASTECTOMY;  Surgeon: Jillian Goldsmith M.D.;  Location: SURGERY SAME DAY [facility];  Service: General    PB MASTECTOMY, PARTIAL Left 6/9/2023    Procedure: LEFT PARTIAL MASTECTOMY WITH AXILLARY SENTINEL LYMPH NODE BIOPSY;  Surgeon: Jillian Goldsmith M.D.;  Location: SURGERY SAME DAY [facility];  Service: General    NODE BIOPSY SENTINEL Left 6/9/2023    Procedure: BIOPSY, LYMPH NODE, SENTINEL;  Surgeon: Jillian Goldsmith M.D.;  Location: SURGERY SAME DAY [facility];  Service: General    COLONOSCOPY  2018    EGD ESOPHAGUS WITH ENDOSCOPIC US  2018    BRAD BY LAPAROSCOPY  2013    TONSILLECTOMY          Allergies:  Macrobid [nitrofurantoin]     Social History:  Social History     Tobacco Use    Smoking status: Former     Current packs/day: 0.00     Average packs/day: 1 pack/day for 6.0 years (6.0 ttl pk-yrs)     Types: Cigarettes     Start date: 7/6/1974      "Quit date: 1980     Years since quittin.6    Smokeless tobacco: Never   Vaping Use    Vaping status: Never Used   Substance Use Topics    Alcohol use: Yes     Alcohol/week: 1.2 oz     Types: 2 Glasses of wine per week     Comment: occ    Drug use: No        Family History:   family history includes Arthritis in her father and mother; Cancer in her father; Diabetes in her brother and mother; Hyperlipidemia in her mother; Hypertension in her mother.      PHYSICAL EXAM:   Vital signs: /64 (BP Location: Left arm, Patient Position: Sitting, BP Cuff Size: Adult)   Pulse 77   Ht 1.575 m (5' 2\")   Wt 82.1 kg (181 lb)   SpO2 95%   BMI 33.11 kg/m²   GENERAL: Well-developed, well-nourished in no apparent distress.   EYE:  No ocular asymmetry, PERRLA, No exophthalmos or lid lag  HENT: Pink, moist mucous membranes.    NECK: No thyromegaly.   CARDIOVASCULAR:  No murmurs  LUNGS: Clear breath sounds  ABDOMEN: Soft, nontender   EXTREMITIES: No clubbing, cyanosis, or edema.   NEUROLOGICAL: No gross focal motor abnormalities, No visible tremors with both hands  LYMPH: No cervical adenopathy palpated.   SKIN: No rashes, lesions.     Labs:  Lab Results   Component Value Date/Time    WBC 6.0 2025 12:53 PM    RBC 4.67 2025 12:53 PM    HEMOGLOBIN 15.1 2025 12:53 PM    MCV 95.3 2025 12:53 PM    MCH 32.3 2025 12:53 PM    MCHC 33.9 2025 12:53 PM    RDW 42.9 2025 12:53 PM    MPV 12.4 2025 12:53 PM       Lab Results   Component Value Date/Time    SODIUM 137 2025 12:53 PM    POTASSIUM 4.1 2025 12:53 PM    CHLORIDE 102 2025 12:53 PM    CO2 21 2025 12:53 PM    ANION 14.0 2025 12:53 PM    GLUCOSE 92 2025 12:53 PM    BUN 12 2025 12:53 PM    CREATININE 0.73 2025 12:53 PM    CALCIUM 9.3 2025 12:53 PM    ASTSGOT 23 2025 12:53 PM    ALTSGPT 20 2025 12:53 PM    TBILIRUBIN 0.3 2025 12:53 PM    ALBUMIN 4.4 2025 " "12:53 PM    ALBUMIN 4.10 2022 08:49 AM    TOTPROTEIN 7.2 2025 12:53 PM    TOTPROTEIN 6.8 2022 08:49 AM    GLOBULIN 2.8 2025 12:53 PM    AGRATIO 1.6 2025 12:53 PM       Lab Results   Component Value Date/Time    TSHULTRASEN <0.005 (L) 2023 1142     Lab Results   Component Value Date/Time    FREET4 1.58 2024 1101     Lab Results   Component Value Date/Time    FREET3 3.75 2022 1017     No results found for: \"THYSTIMIG\"      Imagin2021 2:09 PM     HISTORY/REASON FOR EXAM:  Hashimoto's thyroiditis, thyroid nodule     TECHNIQUE/EXAM DESCRIPTION:  Ultrasound of the soft tissues of the head and neck.     COMPARISON:  Thyroid ultrasound 2018     FINDINGS:  The thyroid gland is heterogeneous.  Vascularity is normal.     The right lobe of the thyroid gland measures 2.95 cm x 5.91 cm x 2.86 cm.  The left lobe of the thyroid gland measures 1.99 cm x 5.75 cm x 2.03 cm.  The isthmus measures 0.71 cm.     Nodules >= 1cm:        Nodule #1  Location:  Right  mid  Size:  3.67 x 2.73 x 2.72 cm  Composition:  Mixed-1  Echogenicity:  Isoechoic-1  Shape:  Wider than tall-0  Margins:  Smooth-0  Echogenic Foci:  None-0     ACR TIRADS points/category:  2 - TR2 - Not Suspicious     Nodule #2  Location:  Left  upper  Size:  1.55 x 0.88 x 0.49 cm  Composition:  Mixed-1  Echogenicity:  Hypoechoic-2  Shape:  Wider than tall-0  Margins:  Smooth-0  Echogenic Foci:  None-0     ACR TIRADS points/category:  3 - TR3 - Mildly Suspicious           IMPRESSION:     A 3.6 cm TR2 mixed nodule in the right thyroid gland.  A 1.5 cm TR3 nodule in the left thyroid gland.     ACR TI-RADS Recommendations  TR3 - follow up ultrasound in 1, 3, and 5 years.     Recommendations based on the American College of Rad[employer] Thyroid imaging, reporting and Data System (TI-RADS) 2017.            2022 11:13 AM     HISTORY/REASON FOR EXAM:  Thyrotoxicosis  Hyperthyroidism     TECHNIQUE/EXAM DESCRIPTION AND " NUMBER OF VIEWS:  Thyroid uptake and scan, nuclear medicine.     PROCEDURE:     468 microcuries I-123 were administered as two capsules. The patient returned five hours later for uptake measurements and scanning.     COMPARISON: Thyroid ultrasound 5/31/2018     FINDINGS:  The planar scan demonstrates homogeneous labeling throughout the thyroid gland.     The five-hour uptake measurement equals 25.7%. Normal range is 8-15%.     IMPRESSION:     Mildly elevated radioactive iodine uptake.             ASSESSMENT/PLAN:     1. Hyperthyroidism  Controlled  Reviewed overview of therapy for hyperthyroidism  Continue methimazole 5 mg daily  Patient is on methimazole which is a high risk medication  I explained the side effects of this medication to the patient  Rarely it can cause liver enzyme elevation and decrease in neutrophil counts and vasculitis  However the side effects are very rare but for safety and preventive health we we will monitor his CBC and CMP periodically while he remains on methimazole  Follow-up in 6 months with repeat labs    2. Graves disease  This is the etiology of her hyperthyroidism    3. Multinodular goiter  Stable she has a dominant nodule in the right mid lobe measuring 4 cm and left upper lobe measuring 1.6 cm which was biopsied and proven benign  Recommend observation  Repeat ultrasound this year    4. Age-related osteoporosis without current pathological fracture  Unstable reviewed pathogenesis of osteoporosis and overview of therapy  Because she has GERD I am avoiding oral bisphosphonates  Will place her on IV bisphosphonates which is Reclast  Reviewed side effects of Reclast  She will go to the infusion center  Discussed the importance of regular weightbearing exercise  Discussed the importance of good dental hygiene and regular dental visits  Discussed the importance of adequate calcium and vitamin supplementation  Reviewed fall precautions  She should notify me if she has any falls or  fractures  Repeat bone density at [facility] in May 2026    5. Vitamin D deficiency  Stable   Vitamin D labs were reviewed with patient  Continue current supplements  Continue monitoring levels       6. Elevated alkaline phosphatase level  Stable I expect this to improve now that her Graves' disease under control    7. History of breast cancer  Stable continue follow-up with oncology      Return in about 7 months (around 9/27/2025).      Total time spent on day of service was over 60 minutes which included obtaining a detailed history and physical exam, ordering labs, coordinating care and scheduling future follow-up    Thank you kindly for allowing me to participate in the thyroid care plan for this patient.    Monico Deluca MD, FACE, NU      CC:   Krista Narayan M.D.

## 2025-05-24 ENCOUNTER — HOSPITAL ENCOUNTER (OUTPATIENT)
Dept: RADIOLOGY | Facility: MEDICAL CENTER | Age: 70
End: 2025-05-24
Attending: INTERNAL MEDICINE
Payer: MEDICARE

## 2025-05-24 DIAGNOSIS — E04.2 MULTINODULAR GOITER: ICD-10-CM

## 2025-05-24 PROCEDURE — 76536 US EXAM OF HEAD AND NECK: CPT

## 2025-06-19 ENCOUNTER — RESULTS FOLLOW-UP (OUTPATIENT)
Dept: ENDOCRINOLOGY | Facility: MEDICAL CENTER | Age: 70
End: 2025-06-19

## (undated) DEVICE — TUBE E-T HI-LO CUFF 7.0MM (10EA/PK)

## (undated) DEVICE — SYRINGE 30 ML LL (56/BX)

## (undated) DEVICE — TOWEL STOP TIMEOUT SAFETY FLAG (40EA/CA)

## (undated) DEVICE — SODIUM CHL IRRIGATION 0.9% 1000ML (12EA/CA)

## (undated) DEVICE — GOWN WARMING STANDARD FLEX - (30/CA)

## (undated) DEVICE — CANISTER SUCTION RIGID RED 1500CC (40EA/CA)

## (undated) DEVICE — SUTURE 3-0 VICRYL PLUS SH - 8X 18 INCH (12/BX)

## (undated) DEVICE — Device

## (undated) DEVICE — GLOVE SZ 8 BIOGEL PI MICRO - PF LF (50PR/BX)

## (undated) DEVICE — SHEET TRANSVERSE LAP - (12EA/CA)

## (undated) DEVICE — TUBING CLEARLINK DUO-VENT - C-FLO (48EA/CA)

## (undated) DEVICE — SET LEADWIRE 5 LEAD BEDSIDE DISPOSABLE ECG (1SET OF 5/EA)

## (undated) DEVICE — MASK OXYGEN VNYL ADLT MED CONC WITH 7 FOOT TUBING  - (50EA/CA)

## (undated) DEVICE — COVER CIV-FLEX TRANSDUCER - (24/BX)

## (undated) DEVICE — KIT  I.V. START (100EA/CA)

## (undated) DEVICE — LACTATED RINGERS INJ 1000 ML - (14EA/CA 60CA/PF)

## (undated) DEVICE — WATER IRRIGATION STERILE 1000ML (12EA/CA)

## (undated) DEVICE — SUTURE 4-0 MONOCRYL PLUS PS-2 - 27 INCH (36/BX)

## (undated) DEVICE — SPONGE XRAY 8X4 STERL. 12PL - (10EA/TY 80TY/CA)

## (undated) DEVICE — TUBE CONNECTING SUCTION - CLEAR PLASTIC STERILE 72 IN (50EA/CA)

## (undated) DEVICE — GLOVE BIOGEL INDICATOR SZ 7SURGICAL PF LTX - (50/BX 4BX/CA)

## (undated) DEVICE — SUCTION INSTRUMENT YANKAUER BULBOUS TIP W/O VENT (50EA/CA)

## (undated) DEVICE — BOVIE BLADE COATED &INSULATED - 25/PK

## (undated) DEVICE — CANISTER SUCTION 3000ML MECHANICAL FILTER AUTO SHUTOFF MEDI-VAC NONSTERILE LF DISP  (40EA/CA)

## (undated) DEVICE — SUTURE GENERAL

## (undated) DEVICE — CANNULA O2 COMFORT SOFT EAR ADULT 7 FT TUBING (50/CA)

## (undated) DEVICE — SENSOR OXIMETER ADULT SPO2 RD SET (20EA/BX)

## (undated) DEVICE — SLEEVE VASO CALF MED - (10PR/CA)

## (undated) DEVICE — GLOVE BIOGEL SZ 8 SURGICAL PF LTX - (50PR/BX 4BX/CA)

## (undated) DEVICE — MASK AIRWAY FLEXIBLE SINGLE-USE SIZE 4 ADULTS (10EA/BX)

## (undated) DEVICE — DERMABOND ADVANCED - (12EA/BX)

## (undated) DEVICE — GOWN SURGEONS LARGE - (32/CA)